# Patient Record
Sex: FEMALE | Race: WHITE
[De-identification: names, ages, dates, MRNs, and addresses within clinical notes are randomized per-mention and may not be internally consistent; named-entity substitution may affect disease eponyms.]

---

## 2018-07-27 ENCOUNTER — HOSPITAL ENCOUNTER (INPATIENT)
Dept: HOSPITAL 47 - EC | Age: 78
LOS: 3 days | Discharge: NURSING, REHAB OR LONG-TERM CARE | DRG: 690 | End: 2018-07-30
Attending: INTERNAL MEDICINE | Admitting: INTERNAL MEDICINE
Payer: MEDICARE

## 2018-07-27 DIAGNOSIS — I87.8: ICD-10-CM

## 2018-07-27 DIAGNOSIS — I08.3: ICD-10-CM

## 2018-07-27 DIAGNOSIS — T46.1X5A: ICD-10-CM

## 2018-07-27 DIAGNOSIS — E86.0: ICD-10-CM

## 2018-07-27 DIAGNOSIS — Z88.0: ICD-10-CM

## 2018-07-27 DIAGNOSIS — Z82.49: ICD-10-CM

## 2018-07-27 DIAGNOSIS — R32: ICD-10-CM

## 2018-07-27 DIAGNOSIS — E83.42: ICD-10-CM

## 2018-07-27 DIAGNOSIS — Z79.899: ICD-10-CM

## 2018-07-27 DIAGNOSIS — R00.1: ICD-10-CM

## 2018-07-27 DIAGNOSIS — R74.8: ICD-10-CM

## 2018-07-27 DIAGNOSIS — I10: ICD-10-CM

## 2018-07-27 DIAGNOSIS — G47.33: ICD-10-CM

## 2018-07-27 DIAGNOSIS — N39.0: Primary | ICD-10-CM

## 2018-07-27 DIAGNOSIS — J44.9: ICD-10-CM

## 2018-07-27 DIAGNOSIS — Z88.1: ICD-10-CM

## 2018-07-27 DIAGNOSIS — Z99.81: ICD-10-CM

## 2018-07-27 DIAGNOSIS — K21.9: ICD-10-CM

## 2018-07-27 DIAGNOSIS — Z87.891: ICD-10-CM

## 2018-07-27 DIAGNOSIS — L03.116: ICD-10-CM

## 2018-07-27 DIAGNOSIS — L03.115: ICD-10-CM

## 2018-07-27 DIAGNOSIS — Z79.82: ICD-10-CM

## 2018-07-27 DIAGNOSIS — E11.9: ICD-10-CM

## 2018-07-27 DIAGNOSIS — J44.0: ICD-10-CM

## 2018-07-27 DIAGNOSIS — F32.9: ICD-10-CM

## 2018-07-27 DIAGNOSIS — B96.1: ICD-10-CM

## 2018-07-27 DIAGNOSIS — F40.240: ICD-10-CM

## 2018-07-27 DIAGNOSIS — Z98.41: ICD-10-CM

## 2018-07-27 DIAGNOSIS — F41.1: ICD-10-CM

## 2018-07-27 DIAGNOSIS — Z87.01: ICD-10-CM

## 2018-07-27 DIAGNOSIS — T44.7X5A: ICD-10-CM

## 2018-07-27 DIAGNOSIS — E66.01: ICD-10-CM

## 2018-07-27 DIAGNOSIS — E78.2: ICD-10-CM

## 2018-07-27 DIAGNOSIS — Z79.4: ICD-10-CM

## 2018-07-27 DIAGNOSIS — Z96.1: ICD-10-CM

## 2018-07-27 DIAGNOSIS — Z96.653: ICD-10-CM

## 2018-07-27 DIAGNOSIS — M19.90: ICD-10-CM

## 2018-07-27 DIAGNOSIS — Z98.42: ICD-10-CM

## 2018-07-27 DIAGNOSIS — I48.91: ICD-10-CM

## 2018-07-27 DIAGNOSIS — R53.1: ICD-10-CM

## 2018-07-27 DIAGNOSIS — I27.20: ICD-10-CM

## 2018-07-27 DIAGNOSIS — Z82.0: ICD-10-CM

## 2018-07-27 DIAGNOSIS — I25.10: ICD-10-CM

## 2018-07-27 LAB
ALBUMIN SERPL-MCNC: 3.4 G/DL (ref 3.5–5)
ALP SERPL-CCNC: 92 U/L (ref 38–126)
ALT SERPL-CCNC: 34 U/L (ref 9–52)
ANION GAP SERPL CALC-SCNC: 5 MMOL/L
APAP SPEC-MCNC: <10 UG/ML
APTT BLD: 22.9 SEC (ref 22–30)
AST SERPL-CCNC: 38 U/L (ref 14–36)
BASOPHILS # BLD AUTO: 0.1 K/UL (ref 0–0.2)
BASOPHILS NFR BLD AUTO: 2 %
BUN SERPL-SCNC: 32 MG/DL (ref 7–17)
CALCIUM SPEC-MCNC: 9.1 MG/DL (ref 8.4–10.2)
CHLORIDE SERPL-SCNC: 106 MMOL/L (ref 98–107)
CK SERPL-CCNC: 136 U/L (ref 30–135)
CO2 SERPL-SCNC: 28 MMOL/L (ref 22–30)
EOSINOPHIL # BLD AUTO: 0.2 K/UL (ref 0–0.7)
EOSINOPHIL NFR BLD AUTO: 4 %
ERYTHROCYTE [DISTWIDTH] IN BLOOD BY AUTOMATED COUNT: 4.29 M/UL (ref 3.8–5.4)
ERYTHROCYTE [DISTWIDTH] IN BLOOD: 15.4 % (ref 11.5–15.5)
GLUCOSE BLD-MCNC: 151 MG/DL (ref 75–99)
GLUCOSE BLD-MCNC: 175 MG/DL (ref 75–99)
GLUCOSE SERPL-MCNC: 186 MG/DL (ref 74–99)
HCT VFR BLD AUTO: 37.3 % (ref 34–46)
HGB BLD-MCNC: 11.3 GM/DL (ref 11.4–16)
INR PPP: 1.2 (ref ?–1.2)
LYMPHOCYTES # SPEC AUTO: 0.8 K/UL (ref 1–4.8)
LYMPHOCYTES NFR SPEC AUTO: 14 %
MAGNESIUM SPEC-SCNC: 1.5 MG/DL (ref 1.6–2.3)
MCH RBC QN AUTO: 26.3 PG (ref 25–35)
MCHC RBC AUTO-ENTMCNC: 30.3 G/DL (ref 31–37)
MCV RBC AUTO: 86.9 FL (ref 80–100)
MONOCYTES # BLD AUTO: 0.4 K/UL (ref 0–1)
MONOCYTES NFR BLD AUTO: 7 %
NEUTROPHILS # BLD AUTO: 3.9 K/UL (ref 1.3–7.7)
NEUTROPHILS NFR BLD AUTO: 72 %
PH UR: 5 [PH] (ref 5–8)
PLATELET # BLD AUTO: 202 K/UL (ref 150–450)
POTASSIUM SERPL-SCNC: 4.4 MMOL/L (ref 3.5–5.1)
PROT SERPL-MCNC: 6.2 G/DL (ref 6.3–8.2)
PT BLD: 11.6 SEC (ref 9–12)
RBC UR QL: 1 /HPF (ref 0–5)
SODIUM SERPL-SCNC: 139 MMOL/L (ref 137–145)
SP GR UR: 1.01 (ref 1–1.03)
SQUAMOUS UR QL AUTO: <1 /HPF (ref 0–4)
TROPONIN I SERPL-MCNC: 0.04 NG/ML (ref 0–0.03)
UROBILINOGEN UR QL STRIP: <2 MG/DL (ref ?–2)
WBC # BLD AUTO: 5.4 K/UL (ref 3.8–10.6)
WBC #/AREA URNS HPF: <1 /HPF (ref 0–5)

## 2018-07-27 PROCEDURE — 83520 IMMUNOASSAY QUANT NOS NONAB: CPT

## 2018-07-27 PROCEDURE — 85730 THROMBOPLASTIN TIME PARTIAL: CPT

## 2018-07-27 PROCEDURE — 84100 ASSAY OF PHOSPHORUS: CPT

## 2018-07-27 PROCEDURE — 85610 PROTHROMBIN TIME: CPT

## 2018-07-27 PROCEDURE — 84484 ASSAY OF TROPONIN QUANT: CPT

## 2018-07-27 PROCEDURE — 96361 HYDRATE IV INFUSION ADD-ON: CPT

## 2018-07-27 PROCEDURE — 84443 ASSAY THYROID STIM HORMONE: CPT

## 2018-07-27 PROCEDURE — 83735 ASSAY OF MAGNESIUM: CPT

## 2018-07-27 PROCEDURE — 96374 THER/PROPH/DIAG INJ IV PUSH: CPT

## 2018-07-27 PROCEDURE — 83036 HEMOGLOBIN GLYCOSYLATED A1C: CPT

## 2018-07-27 PROCEDURE — 93306 TTE W/DOPPLER COMPLETE: CPT

## 2018-07-27 PROCEDURE — 96375 TX/PRO/DX INJ NEW DRUG ADDON: CPT

## 2018-07-27 PROCEDURE — 80053 COMPREHEN METABOLIC PANEL: CPT

## 2018-07-27 PROCEDURE — 94640 AIRWAY INHALATION TREATMENT: CPT

## 2018-07-27 PROCEDURE — 94760 N-INVAS EAR/PLS OXIMETRY 1: CPT

## 2018-07-27 PROCEDURE — 93005 ELECTROCARDIOGRAM TRACING: CPT

## 2018-07-27 PROCEDURE — 36415 COLL VENOUS BLD VENIPUNCTURE: CPT

## 2018-07-27 PROCEDURE — 87086 URINE CULTURE/COLONY COUNT: CPT

## 2018-07-27 PROCEDURE — 82550 ASSAY OF CK (CPK): CPT

## 2018-07-27 PROCEDURE — 87077 CULTURE AEROBIC IDENTIFY: CPT

## 2018-07-27 PROCEDURE — 80048 BASIC METABOLIC PNL TOTAL CA: CPT

## 2018-07-27 PROCEDURE — 81001 URINALYSIS AUTO W/SCOPE: CPT

## 2018-07-27 PROCEDURE — 82553 CREATINE MB FRACTION: CPT

## 2018-07-27 PROCEDURE — 99285 EMERGENCY DEPT VISIT HI MDM: CPT

## 2018-07-27 PROCEDURE — 87186 SC STD MICRODIL/AGAR DIL: CPT

## 2018-07-27 PROCEDURE — 85025 COMPLETE CBC W/AUTO DIFF WBC: CPT

## 2018-07-27 RX ADMIN — ASPIRIN SCH: 325 TABLET ORAL at 21:43

## 2018-07-27 RX ADMIN — IPRATROPIUM BROMIDE AND ALBUTEROL SULFATE SCH: .5; 3 SOLUTION RESPIRATORY (INHALATION) at 20:01

## 2018-07-27 RX ADMIN — INSULIN ASPART SCH UNIT: 100 INJECTION, SOLUTION INTRAVENOUS; SUBCUTANEOUS at 18:43

## 2018-07-27 RX ADMIN — INSULIN ASPART SCH UNIT: 100 INJECTION, SOLUTION INTRAVENOUS; SUBCUTANEOUS at 21:40

## 2018-07-27 RX ADMIN — IPRATROPIUM BROMIDE AND ALBUTEROL SULFATE SCH ML: .5; 3 SOLUTION RESPIRATORY (INHALATION) at 20:05

## 2018-07-27 NOTE — ECHOF
Referral Reason:Elevated troponin, lower extremity edema



MEASUREMENTS

--------

HEIGHT: 162.6 cm

WEIGHT: 113.4 kg

BP: 133/74

RVIDd:   3.7 cm     (< 3.3)

IVSd:   1.3 cm     (0.6 - 1.1)

LVIDd:   4.9 cm     (3.9 - 5.3)

LVPWd:   1.3 cm     (0.6 - 1.1)

IVSs:   1.9 cm

LVIDs:   3.5 cm

LVPWs:   1.9 cm

LA Diam:   4.1 cm     (2.7 - 3.8)

LAESV Index (A-L):   55.23 ml/m

Ao Diam:   3.1 cm     (2.0 - 3.7)

AV Cusp:   1.3 cm     (1.5 - 2.6)

MV EXCURSION:   18.807 mm     (> 18.000)

MV EF SLOPE:   44 mm/s     (70 - 150)

EPSS:   0.2 cm

MV E Dada:   1.67 m/s

MV DecT:   225 ms

MV A Dada:   0.89 m/s

MV E/A Ratio:   1.87 

AV maxP.27 mmHg

AV meanP.64 mmHg

RAP:   15.00 mmHg

RVSP:   78.55 mmHg







FINDINGS

--------

Sinus rhythm.

This was a technically adequate study.

The left ventricular size is normal.   There is mild concentric left ventricular hypertrophy.   Overa
ll left ventricular systolic function is normal with, an EF between 55 - 60 %.

The right ventricle is mildly enlarged.

LA is severely dilated >40 ml/m2

The right atrium is normal in size.

There is moderate to severe aortic valve sclerosis.   Trace to mild aortic regurgitation.   There is 
mild aortic stenosis present.   Peak/mean gradient across the Aortic Valve is 25.27mmHg / 12.64mmHg.

The mitral valve leaflets are moderately thickened.   Severe mitral annular calcification present.   
Moderate mitral regurgitation is present.    The peak and mean MV gradients are 13.43mmHg 3.55mmHg as
 measured by doppler.

Severe tricuspid regurgitation present.   There is severe pulmonary hypertension.   The right ventric
ular systolic pressure, as measured by Doppler, is 78.55mmHg.

Moderate pulmonic regurgitation.

The aortic root size is normal.

The inferior vena cava is dilated with no significant inspiratory collapse which is consistent estima
bharati right atrial pressure of >15 mmHg.

There is no pericardial effusion.



CONCLUSIONS

--------

1. Sinus rhythm.

2. This was a technically adequate study.

3. The left ventricular size is normal.

4. There is mild concentric left ventricular hypertrophy.

5. Overall left ventricular systolic function is normal with, an EF between 55 - 60 %.

6. The right ventricle is mildly enlarged.

7. LA is severely dilated >40 ml/m2

8. The right atrium is normal in size.

9. There is moderate to severe aortic valve sclerosis.

10. Trace to mild aortic regurgitation.

11. There is mild aortic stenosis present.

12. Peak/mean gradient across the Aortic Valve is 25.27mmHg / 12.64mmHg.

13. The mitral valve leaflets are moderately thickened.

14. Severe mitral annular calcification present.

15. Moderate mitral regurgitation is present.

16. The peak and mean MV gradients are 13.43mmHg 3.55mmHg as measured by doppler.

17. Severe tricuspid regurgitation present.

18. There is severe pulmonary hypertension.

19. The right ventricular systolic pressure, as measured by Doppler, is 78.55mmHg.

20. Moderate pulmonic regurgitation.

21. The aortic root size is normal.

22. The inferior vena cava is dilated with no significant inspiratory collapse which is consistent es
timated right atrial pressure of >15 mmHg.

23. There is no pericardial effusion.





SONOGRAPHER: Briana Finch RDCS

## 2018-07-27 NOTE — CONS
CONSULTATION



DATE OF SERVICE:

07/27/2018.



REASON FOR CONSULTATION:

1. UTI.

2. Left leg wound cellulitis.

3. Antibiotic allergy.



HISTORY OF PRESENT ILLNESS:

The patient is a 77-year-old  female who was brought in to the ER at [facility] with chief complaints of not feeling well, feeling run down and thought

she may have a urinary tract infection, as the patient has some burning of urine, but

no significant frequency, suprapubic discomfort, any the flank pain, nausea or

vomiting.  The patient also has been scratching her left leg and has led to some

superficial ulceration. Did have minimal drainage and pain at that left leg site, more

of a dull aching pain, 2/10-3/10 and no radiation.  With these symptoms, the patient

has been evaluated by the ER physician.  On arrival to the ER, the patient has been

afebrile.  She noticed to have a normal white count.  Urine was mildly positive for

nitrate positive, rare WBC.  No chest x-ray has been done.  The patient has been

admitted to hospital to monitor her condition.  The patient started on cefazolin 2 g

q.12 and Infectious Disease was consulted for further recommendation of antibiotic

therapy.  The patient did receive a dose of Rocephin in the ER as well, though.



REVIEW OF SYSTEMS:

CONSTITUTIONALLY:  Positive for weakness.  Denies any high-grade fever.

EYES:  No complaint.

ENT:  No complaint.

RESPIRATORY:  No complaint.

CARDIOVASCULAR:  No complaint.

GENITOURINARY:  As per HPI.

GASTROINTESTINAL:  No complaint.

MUSCULOSKELETAL:  No complaint.

INTEGUMENTARY:  As per HPI.

PSYCHOLOGICAL:  No complaint.

ENDOCRINE:  No complaint.

NEUROLOGIC:  No complaint.



PAST MEDICAL HISTORY:

Hypertension, hyperlipidemia, diabetes mellitus, coronary artery disease, anxiety,

depression.



PAST SURGICAL HISTORY:

Bilateral eye surgery.



SOCIAL HISTORY:

Remote history of smoking.  No drinking or drug use.



FAMILY HISTORY:

No pertinent findings noticed.



ALLERGIES:

To PENICILLIN; however, Rocephin is not any problem.  Also allergic to VANCOMYCIN and

IODINATED CONTRAST DYE.



MEDICATIONS:

Include the patient is currently on:

1. Zoloft.

2. Protonix.

3. Zofran.

4. Nystatin cream.

5. Toprol-XL.

6. Levemir.

7. NovoLog.

8. Lasix.

9. Lovenox.

10.Cefazolin 2 g q.12.

11.Tums.

12.Lipitor.

13.Aspirin.

14.Xanax.

15.DuoNeb.

16.Norco.

17.Tylenol.



EXAMINATION:

Her blood pressure was 60/72 with a pulse of 80, temperature is 97.4.  She is 98% on 3L

nasal cannula.  General description is an elderly female lying in bed in no distress.

No tachypnea or accessory muscle of respiration use.  HEENT shows slight pallor.  No

scleral icterus.  Oral mucous membranes dry.  No pharyngeal erythema or thrush.

NECK:  Trachea central.  No thyromegaly.

LUNGS:  Unlabored breathing.  Clear to auscultation anteriorly.  No wheeze or crackle.

HEART:  S1, S2.  Regular rate and rhythm.

ABDOMEN:  Soft, no tenderness.  No guarding or rigidity.

EXTREMITIES:  Left leg with minimal erythema with a pressure heel ulceration and

drainage on the dressing.  No foul smelling.

SKIN:  No rash or mass palpable.  Neurologically, the patient is awake, alert, oriented

x3.  Mood and affect normal.



LABS:

Hemoglobin 11.8, white count of 5.4.  BUN of 32, creatinine 0.80. Electrolytes have

been normal.  Liver enzymes are normal.  Troponin slightly elevated.  Urine shows

nitrite positive only.



DIAGNOSTIC IMPRESSION AND PLAN:

1. Patient admitted to hospital with generalized weakness, which is likely

    multifactorial in this patient who did have urinary symptoms of burning; however,

    the urine is not significantly positive.  The patient also has superficial

    ulceration with minimal cellulitis not entirely excluded, likely from a gram-

    positive skin georgi.

2. The patient does have multiple antibiotic allergies that does limit the number of

    antibiotics that could be safely used.  She did have history of PENICILLIN;

    however, tolerated Rocephin without any problem.



PLAN:

1. Will keep the patient on cefazolin; however, change the dose to every 8 hours in

    view of her normal kidney function.

2. Cristian the area of redness on the left leg.

3. Depending upon clinical response as well as culture, will adjust her medications

    further if needed.

Thank you for this consultation.  Will follow this patient along with you.





MMODL / IJN: [id number] / Job#: [id number]

## 2018-07-27 NOTE — ED
General Adult HPI





- General


Chief complaint: Weakness


Stated complaint: Weakness


Time Seen by Provider: 07/27/18 11:16


Source: patient, EMS, RN notes reviewed, old records reviewed


Mode of arrival: EMS


Limitations: physical limitation





- History of Present Illness


Initial comments: 





This is a 77-year-old female the ER for evaluation today.  States she is 

presenting for evaluation of weakness, decreased activity level, decreased 

appetite.  A she has complex medical history, coming in from extended care 

facility with mild assisted care.  Patient's level of activity and daily 

activities is significantly diminished the last 2 days.  Usually when she gets 

like this family is concerned about UTI as well as dehydration as well as 

patient's appetite.  Patient himself denies any significant acute complaint or 

recent fevers





- Related Data


 Home Medications











 Medication  Instructions  Recorded  Confirmed


 


ALPRAZolam [Xanax] 0.25 mg PO TID PRN 07/27/18 07/27/18


 


Acetaminophen Tab [Tylenol Tab] 650 mg PO Q6H PRN 07/27/18 07/27/18


 


Albuterol Inhaler [Ventolin Hfa 2 puff INHALATION RT-Q4H PRN 07/27/18 07/27/18





Inhaler]   


 


Ascorbic Acid [Vitamin C] 500 mg PO DAILY 07/27/18 07/27/18


 


Aspirin EC [Ecotrin Low Dose] 81 mg PO DAILY 07/27/18 07/27/18


 


Atorvastatin [Lipitor] 10 mg PO DAILY 07/27/18 07/27/18


 


Bisacodyl [Dulcolax] 10 mg RECTAL DAILY PRN 07/27/18 07/27/18


 


Calcium Carbonate [Tums Ultra 1,177 mg PO TID PRN 07/27/18 07/27/18





Strength]   


 


Cholecalciferol (Vitamin D3) 2,000 unit PO DAILY 07/27/18 07/27/18





[Vitamin D3]   


 


Diltiazem HCl [Diltiazem 12Hr ER] 60 mg PO Q12HR 07/27/18 07/27/18


 


Ferrous Gluconate 324 mg PO MOWEFR 07/27/18 07/27/18


 


Fluconazole [Diflucan] 150 mg PO TH 07/27/18 07/27/18


 


Furosemide [Lasix] 40 mg PO DAILY 07/27/18 07/27/18


 


HYDROcodone/APAP 5-325MG [Norco 1 tab PO Q6HR PRN 07/27/18 07/27/18





5-325]   


 


Insulin Glargine,Hum.rec.anlog 16 unit SQ DAILY 07/27/18 07/27/18





[Lantus Solostar]   


 


Insulin Lispro [humaLOG Kwikpen] See Protocol SQ ACHS 07/27/18 07/27/18


 


Ipratropium-Albuterol Nebulize 3 ml INHALATION RT-QID 07/27/18 07/27/18





[Duoneb 0.5 mg-3 mg/3 ml Soln]   


 


Magnesium Hydroxide [Milk of 2,400 mg PO DAILY PRN 07/27/18 07/27/18





Magnesia]   


 


Metoprolol Succinate (ER) [Toprol 50 mg PO DAILY 07/27/18 07/27/18





Xl]   


 


Nystatin 100,000Unit/gm Cream 1 applic TOPICAL BID PRN 07/27/18 07/27/18





[Mycostatin Cream]   


 


Pantoprazole Sodium [Protonix] 40 mg PO DAILY 07/27/18 07/27/18


 


Q-Tussin-Dm 10 ml PO Q4H PRN 07/27/18 07/27/18


 


Senexon-S 1 tab PO DAILY 07/27/18 07/27/18


 


Sertraline [Zoloft] 100 mg PO DAILY 07/27/18 07/27/18


 


Temazepam [Restoril] 15 mg PO HS PRN 07/27/18 07/27/18











 Allergies











Allergy/AdvReac Type Severity Reaction Status Date / Time


 


Iodinated Contrast- Oral and Allergy  Unknown Verified 07/27/18 11:55





IV Dye     


 


Penicillins Allergy  Unknown Verified 07/27/18 11:55


 


vancomycin Allergy  Unknown Verified 07/27/18 11:55














Review of Systems


ROS Statement: 


Those systems with pertinent positive or pertinent negative responses have been 

documented in the HPI.





ROS Other: All systems not noted in ROS Statement are negative.





Past Medical History


Past Medical History: Coronary Artery Disease (CAD), Diabetes Mellitus, 

Hyperlipidemia, Hypertension


History of Any Multi-Drug Resistant Organisms: None Reported


Past Surgical History: Orthopedic Surgery


Additional Past Surgical History / Comment(s): dontae eye surgery


Past Psychological History: Anxiety, Depression


Smoking Status: Former smoker


Past Alcohol Use History: None Reported


Past Drug Use History: None Reported





General Exam


Limitations: physical limitation


General appearance: alert, in no apparent distress, obese


Head exam: Present: atraumatic, normocephalic, normal inspection


Eye exam: Present: normal appearance, PERRL, EOMI.  Absent: scleral icterus, 

conjunctival injection, periorbital swelling


ENT exam: Present: normal exam, mucous membranes moist


Neck exam: Present: normal inspection.  Absent: tenderness, meningismus, 

lymphadenopathy


Respiratory exam: Present: normal lung sounds bilaterally.  Absent: respiratory 

distress, wheezes, rales, rhonchi, stridor


Cardiovascular Exam: Present: regular rate, normal rhythm, normal heart sounds.

  Absent: systolic murmur, diastolic murmur, rubs, gallop, clicks


GI/Abdominal exam: Present: soft, normal bowel sounds.  Absent: distended, 

tenderness, guarding, rebound, rigid


Extremities exam: Present: normal inspection, full ROM, normal capillary 

refill.  Absent: tenderness, pedal edema, joint swelling, calf tenderness


Back exam: Present: normal inspection


Neurological exam: Present: alert, oriented X3, CN II-XII intact


Psychiatric exam: Present: normal affect, normal mood


Skin exam: Present: warm, dry, intact, normal color.  Absent: rash





Course


 Vital Signs











  07/27/18 07/27/18 07/27/18





  11:11 11:21 12:14


 


Temperature 97.9 F  


 


Pulse Rate 60  59 L


 


Respiratory 20  22





Rate   


 


Blood Pressure 121/67  122/62


 


O2 Sat by Pulse 88 L 95 97





Oximetry   














  07/27/18





  13:00


 


Temperature 97.4 F L


 


Pulse Rate 54 L


 


Respiratory 18





Rate 


 


Blood Pressure 133/74


 


O2 Sat by Pulse 95





Oximetry 














EKG Findings





- EKG Comments:


EKG Findings:: EKG shows sinus bradycardia rate 55, , , QTc 451





Medical Decision Making





- Medical Decision Making





37 female the ER for evaluation, patient has positive UTI, positive weakness.  

Otherwise lab values are within normal limits.  Patient will be admitted for IV 

hydration and IV antibiotics increase of activity and PT





- Lab Data


Result diagrams: 


 07/27/18 11:15





 07/27/18 11:15


 Lab Results











  07/27/18 07/27/18 07/27/18 Range/Units





  11:15 11:15 11:15 


 


WBC    5.4  (3.8-10.6)  k/uL


 


RBC    4.29  (3.80-5.40)  m/uL


 


Hgb    11.3 L  (11.4-16.0)  gm/dL


 


Hct    37.3  (34.0-46.0)  %


 


MCV    86.9  (80.0-100.0)  fL


 


MCH    26.3  (25.0-35.0)  pg


 


MCHC    30.3 L  (31.0-37.0)  g/dL


 


RDW    15.4  (11.5-15.5)  %


 


Plt Count    202  (150-450)  k/uL


 


Neutrophils %    72  %


 


Lymphocytes %    14  %


 


Monocytes %    7  %


 


Eosinophils %    4  %


 


Basophils %    2  %


 


Neutrophils #    3.9  (1.3-7.7)  k/uL


 


Lymphocytes #    0.8 L  (1.0-4.8)  k/uL


 


Monocytes #    0.4  (0-1.0)  k/uL


 


Eosinophils #    0.2  (0-0.7)  k/uL


 


Basophils #    0.1  (0-0.2)  k/uL


 


Hypochromasia    Marked  


 


PT     (9.0-12.0)  sec


 


INR     (<1.2)  


 


APTT     (22.0-30.0)  sec


 


Sodium  139    (137-145)  mmol/L


 


Potassium  4.4    (3.5-5.1)  mmol/L


 


Chloride  106    ()  mmol/L


 


Carbon Dioxide  28    (22-30)  mmol/L


 


Anion Gap  5    mmol/L


 


BUN  32 H    (7-17)  mg/dL


 


Creatinine  0.80    (0.52-1.04)  mg/dL


 


Est GFR (CKD-EPI)AfAm  82    (>60 ml/min/1.73 sqM)  


 


Est GFR (CKD-EPI)NonAf  72    (>60 ml/min/1.73 sqM)  


 


Glucose  186 H    (74-99)  mg/dL


 


Calcium  9.1    (8.4-10.2)  mg/dL


 


Phosphorus  4.0    (2.5-4.5)  mg/dL


 


Magnesium  1.5 L    (1.6-2.3)  mg/dL


 


Total Bilirubin  1.1    (0.2-1.3)  mg/dL


 


AST  38 H    (14-36)  U/L


 


ALT  34    (9-52)  U/L


 


Alkaline Phosphatase  92    ()  U/L


 


Total Creatine Kinase   136 H   ()  U/L


 


CK-MB (CK-2)   3.1 H*   (0.0-2.4)  ng/mL


 


CK-MB (CK-2) Rel Index   2.3   


 


Troponin I   0.043 H*   (0.000-0.034)  ng/mL


 


Total Protein  6.2 L    (6.3-8.2)  g/dL


 


Albumin  3.4 L    (3.5-5.0)  g/dL


 


Urine Color     


 


Urine Appearance     (Clear)  


 


Urine pH     (5.0-8.0)  


 


Ur Specific Gravity     (1.001-1.035)  


 


Urine Protein     (Negative)  


 


Urine Glucose (UA)     (Negative)  


 


Urine Ketones     (Negative)  


 


Urine Blood     (Negative)  


 


Urine Nitrite     (Negative)  


 


Urine Bilirubin     (Negative)  


 


Urine Urobilinogen     (<2.0)  mg/dL


 


Ur Leukocyte Esterase     (Negative)  


 


Urine RBC     (0-5)  /hpf


 


Urine WBC     (0-5)  /hpf


 


Ur Squamous Epith Cells     (0-4)  /hpf


 


Urine Mucus     (None)  /hpf


 


Acetaminophen  <10.0    ug/mL














  07/27/18 07/27/18 Range/Units





  11:15 11:49 


 


WBC    (3.8-10.6)  k/uL


 


RBC    (3.80-5.40)  m/uL


 


Hgb    (11.4-16.0)  gm/dL


 


Hct    (34.0-46.0)  %


 


MCV    (80.0-100.0)  fL


 


MCH    (25.0-35.0)  pg


 


MCHC    (31.0-37.0)  g/dL


 


RDW    (11.5-15.5)  %


 


Plt Count    (150-450)  k/uL


 


Neutrophils %    %


 


Lymphocytes %    %


 


Monocytes %    %


 


Eosinophils %    %


 


Basophils %    %


 


Neutrophils #    (1.3-7.7)  k/uL


 


Lymphocytes #    (1.0-4.8)  k/uL


 


Monocytes #    (0-1.0)  k/uL


 


Eosinophils #    (0-0.7)  k/uL


 


Basophils #    (0-0.2)  k/uL


 


Hypochromasia    


 


PT  11.6   (9.0-12.0)  sec


 


INR  1.2 H   (<1.2)  


 


APTT  22.9   (22.0-30.0)  sec


 


Sodium    (137-145)  mmol/L


 


Potassium    (3.5-5.1)  mmol/L


 


Chloride    ()  mmol/L


 


Carbon Dioxide    (22-30)  mmol/L


 


Anion Gap    mmol/L


 


BUN    (7-17)  mg/dL


 


Creatinine    (0.52-1.04)  mg/dL


 


Est GFR (CKD-EPI)AfAm    (>60 ml/min/1.73 sqM)  


 


Est GFR (CKD-EPI)NonAf    (>60 ml/min/1.73 sqM)  


 


Glucose    (74-99)  mg/dL


 


Calcium    (8.4-10.2)  mg/dL


 


Phosphorus    (2.5-4.5)  mg/dL


 


Magnesium    (1.6-2.3)  mg/dL


 


Total Bilirubin    (0.2-1.3)  mg/dL


 


AST    (14-36)  U/L


 


ALT    (9-52)  U/L


 


Alkaline Phosphatase    ()  U/L


 


Total Creatine Kinase    ()  U/L


 


CK-MB (CK-2)    (0.0-2.4)  ng/mL


 


CK-MB (CK-2) Rel Index    


 


Troponin I    (0.000-0.034)  ng/mL


 


Total Protein    (6.3-8.2)  g/dL


 


Albumin    (3.5-5.0)  g/dL


 


Urine Color   Yellow  


 


Urine Appearance   Clear  (Clear)  


 


Urine pH   5.0  (5.0-8.0)  


 


Ur Specific Gravity   1.008  (1.001-1.035)  


 


Urine Protein   Negative  (Negative)  


 


Urine Glucose (UA)   Negative  (Negative)  


 


Urine Ketones   Negative  (Negative)  


 


Urine Blood   Negative  (Negative)  


 


Urine Nitrite   Positive H  (Negative)  


 


Urine Bilirubin   Negative  (Negative)  


 


Urine Urobilinogen   <2.0  (<2.0)  mg/dL


 


Ur Leukocyte Esterase   Negative  (Negative)  


 


Urine RBC   1  (0-5)  /hpf


 


Urine WBC   <1  (0-5)  /hpf


 


Ur Squamous Epith Cells   <1  (0-4)  /hpf


 


Urine Mucus   Rare H  (None)  /hpf


 


Acetaminophen    ug/mL














Disposition


Clinical Impression: 


 Dehydration, Weakness, UTI (urinary tract infection)





Disposition: ADMITTED AS IP TO THIS HOSP


Condition: Good


Is patient prescribed a controlled substance at d/c from ED?: No

## 2018-07-27 NOTE — P.HPIM
History of Present Illness


H&P Date: 07/27/18


Chief Complaint: Generalized weakness





This is a 77-year-old female with complex past medical history noted below 

significant for morbid obesity who presented to the emergency room from a local 

assisted living facility with progressive weakness.  Over the last few days, 

patient has been feeling progressively weak.  She usually needs assistance with 

transfer from her bed to the chair.  Yesterday one person assist was not enough 

and this morning patient requires 2 people assist as well.  She was sent to the 

emergency room for further evaluation.  Patient does not have any specific 

concerns otherwise.  There is no neurological focal deficits.  No headache or 

chest pain.  No shortness of breath.  She is just feeling generally weak.  No 

fevers or chills.  No abdominal pain.  No urinary symptoms.  Patient was 

evaluated in the emergency room and her lab work was generally within 

acceptable range.  She was noted to be slightly bradycardic with a heart rate 

in the 50s and sinus bradycardia.  She was also noted to have a mildly elevated 

troponin.  Patient herself denies any chest pain or shortness of breath.  12-

lead ECG showed no acute ischemic changes.  Urinalysis show some evidence of 

infection but patient is incontinent.





Review of Systems





Review of system:


14 points review of systems were obtained and were negative except to what were 

mentioned in the HPI.





Past Medical History


Past Medical History: Coronary Artery Disease (CAD), Diabetes Mellitus, 

Hyperlipidemia, Hypertension


History of Any Multi-Drug Resistant Organisms: None Reported


Past Surgical History: Orthopedic Surgery


Additional Past Surgical History / Comment(s): dontae eye surgery


Past Psychological History: Anxiety, Depression


Smoking Status: Former smoker


Past Alcohol Use History: None Reported


Past Drug Use History: None Reported





Medications and Allergies


 Home Medications











 Medication  Instructions  Recorded  Confirmed  Type


 


ALPRAZolam [Xanax] 0.25 mg PO TID PRN 07/27/18 07/27/18 History


 


Acetaminophen Tab [Tylenol Tab] 650 mg PO Q6H PRN 07/27/18 07/27/18 History


 


Albuterol Inhaler [Ventolin Hfa 2 puff INHALATION RT-Q4H PRN 07/27/18 07/27/18 

History





Inhaler]    


 


Ascorbic Acid [Vitamin C] 500 mg PO DAILY 07/27/18 07/27/18 History


 


Aspirin EC [Ecotrin Low Dose] 81 mg PO DAILY 07/27/18 07/27/18 History


 


Atorvastatin [Lipitor] 10 mg PO DAILY 07/27/18 07/27/18 History


 


Bisacodyl [Dulcolax] 10 mg RECTAL DAILY PRN 07/27/18 07/27/18 History


 


Calcium Carbonate [Tums Ultra 1,177 mg PO TID PRN 07/27/18 07/27/18 History





Strength]    


 


Cholecalciferol (Vitamin D3) 2,000 unit PO DAILY 07/27/18 07/27/18 History





[Vitamin D3]    


 


Diltiazem HCl [Diltiazem 12Hr ER] 60 mg PO Q12HR 07/27/18 07/27/18 History


 


Ferrous Gluconate 324 mg PO MOWEFR 07/27/18 07/27/18 History


 


Fluconazole [Diflucan] 150 mg PO TH 07/27/18 07/27/18 History


 


Furosemide [Lasix] 40 mg PO DAILY 07/27/18 07/27/18 History


 


HYDROcodone/APAP 5-325MG [Norco 1 tab PO Q6HR PRN 07/27/18 07/27/18 History





5-325]    


 


Insulin Glargine,Hum.rec.anlog 16 unit SQ DAILY 07/27/18 07/27/18 History





[Lantus Solostar]    


 


Insulin Lispro [humaLOG Kwikpen] See Protocol SQ ACHS 07/27/18 07/27/18 History


 


Ipratropium-Albuterol Nebulize 3 ml INHALATION RT-QID 07/27/18 07/27/18 History





[Duoneb 0.5 mg-3 mg/3 ml Soln]    


 


Magnesium Hydroxide [Milk of 2,400 mg PO DAILY PRN 07/27/18 07/27/18 History





Magnesia]    


 


Metoprolol Succinate (ER) [Toprol 50 mg PO DAILY 07/27/18 07/27/18 History





Xl]    


 


Nystatin 100,000Unit/gm Cream 1 applic TOPICAL BID PRN 07/27/18 07/27/18 History





[Mycostatin Cream]    


 


Pantoprazole Sodium [Protonix] 40 mg PO DAILY 07/27/18 07/27/18 History


 


Q-Tussin-Dm 10 ml PO Q4H PRN 07/27/18 07/27/18 History


 


Senexon-S 1 tab PO DAILY 07/27/18 07/27/18 History


 


Sertraline [Zoloft] 100 mg PO DAILY 07/27/18 07/27/18 History


 


Temazepam [Restoril] 15 mg PO HS PRN 07/27/18 07/27/18 History











 Allergies











Allergy/AdvReac Type Severity Reaction Status Date / Time


 


Iodinated Contrast- Oral and Allergy  Unknown Verified 07/27/18 11:55





IV Dye     


 


Penicillins Allergy  Unknown Verified 07/27/18 11:55


 


vancomycin Allergy  Unknown Verified 07/27/18 11:55














Physical Exam


Vitals: 


 Vital Signs











  Temp Pulse Resp BP Pulse Ox


 


 07/27/18 13:00  97.4 F L  54 L  18  133/74  95


 


 07/27/18 12:14   59 L  22  122/62  97


 


 07/27/18 11:21      95


 


 07/27/18 11:11  97.9 F  60  20  121/67  88 L








 Intake and Output











 07/26/18 07/27/18 07/27/18





 22:59 06:59 14:59


 


Other:   


 


  Weight   113.398 kg














General:  The patient is awake and alert, in no distress


Eye: there is normal conjunctiva bilaterally.  


Neck:  The neck is supple, there is no  JVD.  


Cardiovascular:  Heart sounds are distant.  Normal  S1-S2, no S3-S4, no murmurs.


Respiratory: Lungs clear to anterior chest  auscultation bilaterally


Gastrointestinal: Abdomen is  very obese, soft, nontender


Musculoskeletal:  There is no pedal edema.  


Neurological:. Speech is normal. 


Skin:  Skin is warm and dry.  There is evidence of chronic venous stasis on 

both legs with area of worsening erythema and a small ulceration on the 

anterior aspect of the left mid chin.  There is no drainage or pus noted. 





Results


CBC & Chem 7: 


 07/27/18 11:15





 07/27/18 11:15


Labs: 


 Abnormal Lab Results - Last 24 Hours (Table)











  07/27/18 07/27/18 07/27/18 Range/Units





  11:15 11:15 11:15 


 


Hgb    11.3 L  (11.4-16.0)  gm/dL


 


MCHC    30.3 L  (31.0-37.0)  g/dL


 


Lymphocytes #    0.8 L  (1.0-4.8)  k/uL


 


INR     (<1.2)  


 


BUN  32 H    (7-17)  mg/dL


 


Glucose  186 H    (74-99)  mg/dL


 


Magnesium  1.5 L    (1.6-2.3)  mg/dL


 


AST  38 H    (14-36)  U/L


 


Total Creatine Kinase   136 H   ()  U/L


 


CK-MB (CK-2)   3.1 H*   (0.0-2.4)  ng/mL


 


Troponin I   0.043 H*   (0.000-0.034)  ng/mL


 


Total Protein  6.2 L    (6.3-8.2)  g/dL


 


Albumin  3.4 L    (3.5-5.0)  g/dL


 


Urine Nitrite     (Negative)  


 


Urine Mucus     (None)  /hpf














  07/27/18 07/27/18 Range/Units





  11:15 11:49 


 


Hgb    (11.4-16.0)  gm/dL


 


MCHC    (31.0-37.0)  g/dL


 


Lymphocytes #    (1.0-4.8)  k/uL


 


INR  1.2 H   (<1.2)  


 


BUN    (7-17)  mg/dL


 


Glucose    (74-99)  mg/dL


 


Magnesium    (1.6-2.3)  mg/dL


 


AST    (14-36)  U/L


 


Total Creatine Kinase    ()  U/L


 


CK-MB (CK-2)    (0.0-2.4)  ng/mL


 


Troponin I    (0.000-0.034)  ng/mL


 


Total Protein    (6.3-8.2)  g/dL


 


Albumin    (3.5-5.0)  g/dL


 


Urine Nitrite   Positive H  (Negative)  


 


Urine Mucus   Rare H  (None)  /hpf














Assessment and Plan


Assessment: 





1.  Sinus bradycardia, may be attributed to Cardizem and Toprol-XL use.  I 

would decrease her Toprol-XL dose from 50-25 mg daily.  I would continue 

telemetry monitoring.  We will check thyroid function tests.





2.  Elevated troponin: Most likely non-thrombotic troponin leak.  Patient 

denies any chest pain.  12-lead ECG showed no acute ischemic changes.  I would 

obtain repeat troponin.  I would order echocardiogram.  Consult cardiology for 

further evaluation.  Continue telemetry monitoring.  Patient sister informed me 

that patient had underlying coronary artery disease but no prior left heart 

catheterization.  She was seen by cardiology at [facility] previously





3.  Non-complicated urinary tract infection, given 1 dose of IV ceftriaxone in 

the emergency room.  Antibiotic switched to IV cefazolin.  Awaiting urine 

culture.





4.  Bilateral lower extremity cellulitis/chronic venous stasis: Continue IV 

cefazolin.  Infectious disease consulted for further evaluation and 

recommendations





5.  Type II diabetes mellitus, continue home dose of insulin.  Add sliding 

scale.  A1c ordered 





6.  Morbid obesity





7.  Mixed hyperlipidemia on Lipitor





8.  Generalized anxiety disorder, maintained on Xanax 3 times a day.  I would 

discontinue nighttime off Restoril





9.  Major depressive disorder: Continue home medication





10.  Physical debility, PT/OT/ consulted.  Patient may benefit 

from subacute rehab

## 2018-07-28 LAB
ANION GAP SERPL CALC-SCNC: 9 MMOL/L
BASOPHILS # BLD AUTO: 0.1 K/UL (ref 0–0.2)
BASOPHILS NFR BLD AUTO: 1 %
BUN SERPL-SCNC: 33 MG/DL (ref 7–17)
CALCIUM SPEC-MCNC: 8.9 MG/DL (ref 8.4–10.2)
CHLORIDE SERPL-SCNC: 109 MMOL/L (ref 98–107)
CO2 SERPL-SCNC: 24 MMOL/L (ref 22–30)
EOSINOPHIL # BLD AUTO: 0.3 K/UL (ref 0–0.7)
EOSINOPHIL NFR BLD AUTO: 6 %
ERYTHROCYTE [DISTWIDTH] IN BLOOD BY AUTOMATED COUNT: 4.4 M/UL (ref 3.8–5.4)
ERYTHROCYTE [DISTWIDTH] IN BLOOD: 15.3 % (ref 11.5–15.5)
GLUCOSE BLD-MCNC: 124 MG/DL (ref 75–99)
GLUCOSE BLD-MCNC: 124 MG/DL (ref 75–99)
GLUCOSE BLD-MCNC: 137 MG/DL (ref 75–99)
GLUCOSE BLD-MCNC: 153 MG/DL (ref 75–99)
GLUCOSE BLD-MCNC: 200 MG/DL (ref 75–99)
GLUCOSE SERPL-MCNC: 112 MG/DL (ref 74–99)
HBA1C MFR BLD: 8.1 % (ref 4–6)
HCT VFR BLD AUTO: 38.5 % (ref 34–46)
HGB BLD-MCNC: 11.2 GM/DL (ref 11.4–16)
LYMPHOCYTES # SPEC AUTO: 1 K/UL (ref 1–4.8)
LYMPHOCYTES NFR SPEC AUTO: 20 %
MAGNESIUM SPEC-SCNC: 1.5 MG/DL (ref 1.6–2.3)
MCH RBC QN AUTO: 25.5 PG (ref 25–35)
MCHC RBC AUTO-ENTMCNC: 29.1 G/DL (ref 31–37)
MCV RBC AUTO: 87.5 FL (ref 80–100)
MONOCYTES # BLD AUTO: 0.4 K/UL (ref 0–1)
MONOCYTES NFR BLD AUTO: 8 %
NEUTROPHILS # BLD AUTO: 3 K/UL (ref 1.3–7.7)
NEUTROPHILS NFR BLD AUTO: 63 %
PLATELET # BLD AUTO: 188 K/UL (ref 150–450)
POTASSIUM SERPL-SCNC: 4.4 MMOL/L (ref 3.5–5.1)
SODIUM SERPL-SCNC: 142 MMOL/L (ref 137–145)
WBC # BLD AUTO: 4.8 K/UL (ref 3.8–10.6)

## 2018-07-28 RX ADMIN — INSULIN ASPART SCH UNIT: 100 INJECTION, SOLUTION INTRAVENOUS; SUBCUTANEOUS at 21:09

## 2018-07-28 RX ADMIN — OXYCODONE HYDROCHLORIDE AND ACETAMINOPHEN SCH MG: 500 TABLET ORAL at 09:17

## 2018-07-28 RX ADMIN — DILTIAZEM HYDROCHLORIDE SCH MG: 60 TABLET, FILM COATED ORAL at 13:07

## 2018-07-28 RX ADMIN — METOPROLOL SUCCINATE SCH MG: 25 TABLET, EXTENDED RELEASE ORAL at 11:48

## 2018-07-28 RX ADMIN — IPRATROPIUM BROMIDE AND ALBUTEROL SULFATE SCH: .5; 3 SOLUTION RESPIRATORY (INHALATION) at 13:30

## 2018-07-28 RX ADMIN — PANTOPRAZOLE SODIUM SCH MG: 40 TABLET, DELAYED RELEASE ORAL at 06:34

## 2018-07-28 RX ADMIN — INSULIN DETEMIR SCH UNIT: 100 INJECTION, SOLUTION SUBCUTANEOUS at 09:20

## 2018-07-28 RX ADMIN — ATORVASTATIN CALCIUM SCH MG: 10 TABLET, FILM COATED ORAL at 09:17

## 2018-07-28 RX ADMIN — FUROSEMIDE SCH MG: 40 TABLET ORAL at 09:18

## 2018-07-28 RX ADMIN — ASPIRIN 81 MG CHEWABLE TABLET SCH MG: 81 TABLET CHEWABLE at 09:17

## 2018-07-28 RX ADMIN — INSULIN ASPART SCH: 100 INJECTION, SOLUTION INTRAVENOUS; SUBCUTANEOUS at 06:21

## 2018-07-28 RX ADMIN — ENOXAPARIN SODIUM SCH MG: 40 INJECTION SUBCUTANEOUS at 09:18

## 2018-07-28 RX ADMIN — IPRATROPIUM BROMIDE AND ALBUTEROL SULFATE SCH ML: .5; 3 SOLUTION RESPIRATORY (INHALATION) at 15:21

## 2018-07-28 RX ADMIN — IPRATROPIUM BROMIDE AND ALBUTEROL SULFATE SCH ML: .5; 3 SOLUTION RESPIRATORY (INHALATION) at 19:28

## 2018-07-28 RX ADMIN — INSULIN ASPART SCH UNIT: 100 INJECTION, SOLUTION INTRAVENOUS; SUBCUTANEOUS at 17:56

## 2018-07-28 RX ADMIN — CEFAZOLIN SCH GM: 10 INJECTION, POWDER, FOR SOLUTION INTRAVENOUS at 15:17

## 2018-07-28 RX ADMIN — DILTIAZEM HYDROCHLORIDE SCH MG: 60 TABLET, FILM COATED ORAL at 21:10

## 2018-07-28 RX ADMIN — Medication SCH UNIT: at 09:17

## 2018-07-28 RX ADMIN — INSULIN ASPART SCH: 100 INJECTION, SOLUTION INTRAVENOUS; SUBCUTANEOUS at 11:44

## 2018-07-28 RX ADMIN — ASPIRIN SCH: 325 TABLET ORAL at 13:19

## 2018-07-28 RX ADMIN — CEFAZOLIN SCH GM: 10 INJECTION, POWDER, FOR SOLUTION INTRAVENOUS at 09:17

## 2018-07-28 RX ADMIN — SERTRALINE HYDROCHLORIDE SCH MG: 100 TABLET ORAL at 09:18

## 2018-07-28 RX ADMIN — CEFAZOLIN SCH GM: 10 INJECTION, POWDER, FOR SOLUTION INTRAVENOUS at 23:22

## 2018-07-28 RX ADMIN — IPRATROPIUM BROMIDE AND ALBUTEROL SULFATE SCH ML: .5; 3 SOLUTION RESPIRATORY (INHALATION) at 09:39

## 2018-07-28 NOTE — P.CRDCN
History of Present Illness


Consult date: 07/28/18


Requesting physician: Geraldine Pabon


Reason for Consult (text): 


elevated troponin





Chief complaint: progressively worsening weakness


History of present illness: 


This is a pleasant 77-year-old female patient who resides in an extended care 

facility.  She is a poor historian with forgetfulness and mild confusion noted.

  Most of the HPI was obtained from the chart.  She has a past medical history 

significant for CAD which she follows with a cardiologist in [place], diabetes

, hyperlipidemia, hypertension, anxiety and depression.  She presented to the 

emergency department brought in by her family due to worsening weakness 

diminishment of her usual everyday activities.  She denied complaints of chest 

discomfort, palpitations, shortness of breath, nausea or syncope.  She also 

complains of lower extremity edema.  EKG on admission showed sinus bradycardia 

with occasional PVCs.  Echocardiogram showed normal LV systolic function with 

an ejection fraction between 55-60%, mildly enlarged RV, moderate to severe 

aortic valve sclerosis, trace to mild aortic regurgitation and mild aortic 

stenosis.  Also showed moderate MR, severe TR and severe pulmonary 

hypertension.  We were consulted to see the patient because of mild elevation 

in troponins of 0.043 and 0.049.  Her vital signs of been stable.  Upon 

examination, patient is resting currently in bed.  She does verbalize being 

forgetful with some confusion at times.  She denies complaints of significant 

weakness at this time.








Past Medical History


Past Medical History: Atrial Fibrillation, Coronary Artery Disease (CAD), 

Diabetes Mellitus, GERD/Reflux, Hyperlipidemia, Hypertension, Osteoarthritis (OA

), Pneumonia, Sleep Apnea/CPAP/BIPAP


Additional Past Medical History / Comment(s): home 02 3 liters atc,lt lower leg 

wound


History of Any Multi-Drug Resistant Organisms: None Reported


Past Surgical History: Orthopedic Surgery, Tonsillectomy


Additional Past Surgical History / Comment(s): dontae cataracts removed has lens 

implants, dontae knee replacments


Past Anesthesia/Blood Transfusion Reactions: Motion Sickness


Additional Past Anesthesia/Blood Transfusion Reaction / Comment(s): 

clausterphobia


Smoking Status: Former smoker





- Past Family History


  ** Father


Family Medical History: Coronary Artery Disease (CAD)


Additional Family Medical History / Comment(s): enlarged heart





  ** Mother


Family Medical History: Dementia, Hypertension


Additional Family Medical History / Comment(s): hysterectomy-d/t benign tumor





Medications and Allergies


 Home Medications











 Medication  Instructions  Recorded  Confirmed  Type


 


ALPRAZolam [Xanax] 0.25 mg PO TID PRN 07/27/18 07/27/18 History


 


Acetaminophen Tab [Tylenol Tab] 650 mg PO Q6H PRN 07/27/18 07/27/18 History


 


Albuterol Inhaler [Ventolin Hfa 2 puff INHALATION RT-Q4H PRN 07/27/18 07/27/18 

History





Inhaler]    


 


Ascorbic Acid [Vitamin C] 500 mg PO DAILY 07/27/18 07/27/18 History


 


Aspirin EC [Ecotrin Low Dose] 81 mg PO DAILY 07/27/18 07/27/18 History


 


Atorvastatin [Lipitor] 10 mg PO DAILY 07/27/18 07/27/18 History


 


Bisacodyl [Dulcolax] 10 mg RECTAL DAILY PRN 07/27/18 07/27/18 History


 


Calcium Carbonate [Tums Ultra 1,177 mg PO TID PRN 07/27/18 07/27/18 History





Strength]    


 


Cholecalciferol (Vitamin D3) 2,000 unit PO DAILY 07/27/18 07/27/18 History





[Vitamin D3]    


 


Diltiazem HCl [Diltiazem 12Hr ER] 60 mg PO Q12HR 07/27/18 07/27/18 History


 


Ferrous Gluconate 324 mg PO MOWEFR 07/27/18 07/27/18 History


 


Fluconazole [Diflucan] 150 mg PO TH 07/27/18 07/27/18 History


 


Furosemide [Lasix] 40 mg PO DAILY 07/27/18 07/27/18 History


 


HYDROcodone/APAP 5-325MG [Norco 1 tab PO Q6HR PRN 07/27/18 07/27/18 History





5-325]    


 


Insulin Glargine,Hum.rec.anlog 16 unit SQ DAILY 07/27/18 07/27/18 History





[Lantus Solostar]    


 


Insulin Lispro [humaLOG Kwikpen] See Protocol SQ ACHS 07/27/18 07/27/18 History


 


Ipratropium-Albuterol Nebulize 3 ml INHALATION RT-QID 07/27/18 07/27/18 History





[Duoneb 0.5 mg-3 mg/3 ml Soln]    


 


Magnesium Hydroxide [Milk of 2,400 mg PO DAILY PRN 07/27/18 07/27/18 History





Magnesia]    


 


Metoprolol Succinate (ER) [Toprol 50 mg PO DAILY 07/27/18 07/27/18 History





Xl]    


 


Nystatin 100,000Unit/gm Cream 1 applic TOPICAL BID PRN 07/27/18 07/27/18 History





[Mycostatin Cream]    


 


Pantoprazole Sodium [Protonix] 40 mg PO DAILY 07/27/18 07/27/18 History


 


Q-Tussin-Dm 10 ml PO Q4H PRN 07/27/18 07/27/18 History


 


Senexon-S 1 tab PO DAILY 07/27/18 07/27/18 History


 


Sertraline [Zoloft] 100 mg PO DAILY 07/27/18 07/27/18 History


 


Temazepam [Restoril] 15 mg PO HS PRN 07/27/18 07/27/18 History











 Allergies











Allergy/AdvReac Type Severity Reaction Status Date / Time


 


Iodinated Contrast- Oral and Allergy  Unknown Verified 07/27/18 11:55





IV Dye     


 


Penicillins Allergy  Unknown Verified 07/27/18 11:55


 


vancomycin Allergy  Unknown Verified 07/27/18 11:55














Physical Exam


Vitals: 


 Vital Signs











  Temp Pulse Pulse Resp BP BP Pulse Ox


 


 07/28/18 09:49   60     


 


 07/28/18 09:39   60     


 


 07/28/18 09:15  97.7 F   58 L  18   130/73  97


 


 07/28/18 03:00  97.4 F L   60  18   136/60  97


 


 07/27/18 23:20  98.3 F   52 L  18   129/56  97


 


 07/27/18 20:17   42 L     


 


 07/27/18 20:05   45 L     


 


 07/27/18 20:00  97.0 F L   60  18   159/71  96


 


 07/27/18 19:26     18   


 


 07/27/18 19:24  98.5 F   75  18   160/55  95


 


 07/27/18 17:04  97.2 F L  58 L   18  147/83   95


 


 07/27/18 15:00   58 L   18  161/72   98


 


 07/27/18 13:00  97.4 F L  54 L   18  133/74   95


 


 07/27/18 12:14   59 L   22  122/62   97


 


 07/27/18 11:21        95


 


 07/27/18 11:11  97.9 F  60   20  121/67   88 L








 Intake and Output











 07/27/18 07/28/18 07/28/18





 22:59 06:59 14:59


 


Intake Total 1000  240


 


Balance 1000  240


 


Intake:   


 


  Amount of Fluid Infused ( 1000  





  ml)   


 


  Oral   240


 


Other:   


 


  # Voids 1 1 


 


  Weight  121 kg 











PHYSICAL EXAMINATION: 





HEENT: Head is atraumatic, normocephalic.  Pupils equal, round.  Neck is 

supple.  There is no elevated jugular venous pressure.





HEART EXAMINATION: Heart sounds regular, S1 and S2 with a systolic murmur.





CHEST EXAMINATION: Lungs reveal diminished air entry bilaterally. No chest wall 

tenderness is noted on palpation or with deep breathing.





ABDOMEN:  Soft, obese, nontender. Bowel sounds are heard. No organomegaly noted.


 


EXTREMITIES: 1+ peripheral pulses with evidence of 1+ peripheral edema and no 

calf tenderness noted. Erythema noted bilaterally.





NEUROLOGIC patient is awake, alert and oriented x2-3.  Memory loss and mild 

confusion noted


 


.


 











Results





 07/28/18 06:59





 07/28/18 06:59


 Cardiac Enzymes











  07/27/18 07/27/18 07/27/18 Range/Units





  11:15 11:15 17:28 


 


AST  38 H    (14-36)  U/L


 


CK-MB (CK-2)   3.1 H*   (0.0-2.4)  ng/mL


 


Troponin I   0.043 H*  0.049 H*  (0.000-0.034)  ng/mL








 Coagulation











  07/27/18 Range/Units





  11:15 


 


PT  11.6  (9.0-12.0)  sec


 


APTT  22.9  (22.0-30.0)  sec








 CBC











  07/27/18 07/28/18 Range/Units





  11:15 06:59 


 


WBC  5.4  4.8  (3.8-10.6)  k/uL


 


RBC  4.29  4.40  (3.80-5.40)  m/uL


 


Hgb  11.3 L  11.2 L  (11.4-16.0)  gm/dL


 


Hct  37.3  38.5  (34.0-46.0)  %


 


Plt Count  202  188  (150-450)  k/uL








 Comprehensive Metabolic Panel











  07/27/18 07/28/18 Range/Units





  11:15 06:59 


 


Sodium  139  142  (137-145)  mmol/L


 


Potassium  4.4  4.4  (3.5-5.1)  mmol/L


 


Chloride  106  109 H  ()  mmol/L


 


Carbon Dioxide  28  24  (22-30)  mmol/L


 


BUN  32 H  33 H  (7-17)  mg/dL


 


Creatinine  0.80  0.98  (0.52-1.04)  mg/dL


 


Glucose  186 H  112 H  (74-99)  mg/dL


 


Calcium  9.1  8.9  (8.4-10.2)  mg/dL


 


AST  38 H   (14-36)  U/L


 


ALT  34   (9-52)  U/L


 


Alkaline Phosphatase  92   ()  U/L


 


Total Protein  6.2 L   (6.3-8.2)  g/dL


 


Albumin  3.4 L   (3.5-5.0)  g/dL








 Current Medications











Generic Name Dose Route Start Last Admin





  Trade Name Freq  PRN Reason Stop Dose Admin


 


Acetaminophen  650 mg  07/27/18 14:39  





  Tylenol Tab  PO   





  Q6HR PRN   





  Fever and/ or Pain   





     





     





     


 


Hydrocodone Bitart/Acetaminophen  1 each  07/27/18 14:40  





  [place] 5-325  PO   





  Q6HR PRN   





  Moderate Pain   





     





     





     


 


Albuterol/Ipratropium  3 ml  07/27/18 16:00  07/28/18 09:39





  Duoneb 0.5 Mg-3 Mg/3 Ml Soln  INHALATION   3 ml





  RT-QID GABRIEL   Administration





     





     





     





     


 


Alprazolam  0.25 mg  07/27/18 14:40  





  Xanax  PO   





  TID PRN   





  Anxiety   





     





     





     


 


Ascorbic Acid  500 mg  07/28/18 09:00  07/28/18 09:17





  Vitamin C  PO   500 mg





  DAILY GABRIEL   Administration





     





     





     





     


 


Aspirin  81 mg  07/28/18 09:00  07/28/18 09:17





  Aspirin  PO   81 mg





  DAILY GABRIEL   Administration





     





     





     





     


 


Atorvastatin Calcium  10 mg  07/28/18 09:00  07/28/18 09:17





  Lipitor  PO   10 mg





  DAILY GABRIEL   Administration





     





     





     





     


 


Calcium Carbonate/Glycine  1,000 mg  07/27/18 14:40  





  Tums  PO   





  TID PRN   





  Indigestion   





     





     





     


 


Cefazolin Sodium  2 gm  07/28/18 08:00  07/28/18 09:17





  Kefzol  IVP   2 gm





  Q8HR GABRIEL   Administration





     





     





     





     


 


Cholecalciferol  2,000 unit  07/28/18 09:00  07/28/18 09:17





  Vitamin D3  PO   2,000 unit





  DAILY GABRIEL   Administration





     





     





     





     


 


Enoxaparin Sodium  40 mg  07/28/18 09:00  07/28/18 09:18





  Lovenox  SQ   40 mg





  DAILY GABRIEL   Administration





     





     





     





     


 


Furosemide  40 mg  07/28/18 09:00  07/28/18 09:18





  Lasix  PO   40 mg





  DAILY GABRIEL   Administration





     





     





     





     


 


Insulin Aspart  0 unit  07/27/18 17:30  07/28/18 06:21





  Novolog  SQ   Not Given





  [facility]   





     





     





  Protocol   





     


 


Insulin Detemir  16 unit  07/28/18 09:00  07/28/18 09:20





  Levemir  SQ   16 unit





  DAILY GABRIEL   Administration





     





     





     





     


 


Metoprolol Succinate  25 mg  07/28/18 09:00  





  Toprol Xl  PO   





  DAILY [facility]   





     





     





     





     


 


Miscellaneous Information  1 each  07/27/18 14:45  





  Potassium Per Protocol  MISCELLANE   





  DAILY PRN   





  Per Protocol   





     





  Protocol   





     


 


Diltiazem Hcl [  60 mg  07/27/18 21:00  07/27/18 21:43





  Diltiazem 12hr Er]  PO   Not Given





  Q12HR [facility]   





     





     





     





     


 


Nystatin  1 applic  07/27/18 14:40  





  Mycostatin Cream  TOPICAL   





  BID PRN   





  [facility]ash   





     





     





     


 


Ondansetron HCl  4 mg  07/27/18 14:39  





  Zofran  IVP   





  Q6HR PRN   





  Nausea And Vomiting   





     





     





     


 


Pantoprazole Sodium  40 mg  07/28/18 07:30  07/28/18 06:34





  Protonix  PO   40 mg





  AC-[id number] GABRIEL   Administration





     





     





     





     


 


Sertraline HCl  100 mg  07/28/18 09:00  07/28/18 09:18





  Zoloft  PO   100 mg





  DAILY GABRIEL   Administration





     





     





     





     








 Intake and Output











 07/27/18 07/28/18 07/28/18





 22:59 06:59 14:59


 


Intake Total 1000  240


 


Balance 1000  240


 


Intake:   


 


  Amount of Fluid Infused ( 1000  





  ml)   


 


  Oral   240


 


Other:   


 


  # Voids 1 1 


 


  Weight  121 kg 








 





 07/28/18 06:59 





 07/28/18 06:59 











Assessment and Plan


Assessment: 


#1 altered mental status and weakness


#2 UTI


#3 cellulitis


#4 history of CAD with no prior cardiac catheterization


#5 mild troponin leak of uncertain significance


# 6 COPD


# 7 severe pulmonary hypertension





Plan: 


From cardiology 's perspective, we will treat the patient medically at this 

time.  Optimize medical therapy.  Further recommendations to follow.





NP note has been reviewed, I agree with a documented findings and plan of care.

  Patient was seen and examined.

## 2018-07-28 NOTE — P.PN
Subjective


Progress Note Date: 07/28/18


Principal diagnosis: 





UTI and bradycardia





Patient is doing a lot better today.  Her mentation is back to baseline.  No 

acute events overnight.





Objective





- Vital Signs


Vital signs: 


 Vital Signs











Temp  97.7 F   07/28/18 09:15


 


Pulse  60   07/28/18 09:49


 


Resp  18   07/28/18 09:15


 


BP  130/73   07/28/18 09:15


 


Pulse Ox  97   07/28/18 09:15








 Intake & Output











 07/27/18 07/28/18 07/28/18





 18:59 06:59 18:59


 


Intake Total 1000  240


 


Balance 1000  240


 


Weight 113.398 kg 121 kg 


 


Intake:   


 


  Amount of Fluid Infused ( 1000  





  ml)   


 


  Oral   240


 


Other:   


 


  # Voids  1 














- Exam





General:  The patient is awake and alert, in no distress


Eye: there is normal conjunctiva bilaterally.  


Neck:  The neck is supple, there is no  JVD.  


Cardiovascular:  Normal  S1-S2, no S3-S4, no murmurs.


Respiratory: Lungs clear to auscultation bilaterally


Gastrointestinal: Abdomen is soft, nontender


Neurological:. Speech is normal. 


Skin:  Skin is warm and dry 





- Labs


CBC & Chem 7: 


 07/28/18 06:59





 07/28/18 06:59


Labs: 


 Abnormal Lab Results - Last 24 Hours (Table)











  07/27/18 07/27/18 07/27/18 Range/Units





  11:15 11:15 11:15 


 


Hgb    11.3 L  (11.4-16.0)  gm/dL


 


MCHC    30.3 L  (31.0-37.0)  g/dL


 


Lymphocytes #    0.8 L  (1.0-4.8)  k/uL


 


INR     (<1.2)  


 


Chloride     ()  mmol/L


 


BUN  32 H    (7-17)  mg/dL


 


Glucose  186 H    (74-99)  mg/dL


 


POC Glucose (mg/dL)     (75-99)  mg/dL


 


Magnesium  1.5 L    (1.6-2.3)  mg/dL


 


AST  38 H    (14-36)  U/L


 


Total Creatine Kinase   136 H   ()  U/L


 


CK-MB (CK-2)   3.1 H*   (0.0-2.4)  ng/mL


 


Troponin I   0.043 H*   (0.000-0.034)  ng/mL


 


Total Protein  6.2 L    (6.3-8.2)  g/dL


 


Albumin  3.4 L    (3.5-5.0)  g/dL


 


Urine Nitrite     (Negative)  


 


Urine Mucus     (None)  /hpf














  07/27/18 07/27/18 07/27/18 Range/Units





  11:15 11:49 15:47 


 


Hgb     (11.4-16.0)  gm/dL


 


MCHC     (31.0-37.0)  g/dL


 


Lymphocytes #     (1.0-4.8)  k/uL


 


INR  1.2 H    (<1.2)  


 


Chloride     ()  mmol/L


 


BUN     (7-17)  mg/dL


 


Glucose     (74-99)  mg/dL


 


POC Glucose (mg/dL)    151 H  (75-99)  mg/dL


 


Magnesium     (1.6-2.3)  mg/dL


 


AST     (14-36)  U/L


 


Total Creatine Kinase     ()  U/L


 


CK-MB (CK-2)     (0.0-2.4)  ng/mL


 


Troponin I     (0.000-0.034)  ng/mL


 


Total Protein     (6.3-8.2)  g/dL


 


Albumin     (3.5-5.0)  g/dL


 


Urine Nitrite   Positive H   (Negative)  


 


Urine Mucus   Rare H   (None)  /hpf














  07/27/18 07/27/18 07/28/18 Range/Units





  17:28 20:48 05:59 


 


Hgb     (11.4-16.0)  gm/dL


 


MCHC     (31.0-37.0)  g/dL


 


Lymphocytes #     (1.0-4.8)  k/uL


 


INR     (<1.2)  


 


Chloride     ()  mmol/L


 


BUN     (7-17)  mg/dL


 


Glucose     (74-99)  mg/dL


 


POC Glucose (mg/dL)   175 H  124 H  (75-99)  mg/dL


 


Magnesium     (1.6-2.3)  mg/dL


 


AST     (14-36)  U/L


 


Total Creatine Kinase     ()  U/L


 


CK-MB (CK-2)     (0.0-2.4)  ng/mL


 


Troponin I  0.049 H*    (0.000-0.034)  ng/mL


 


Total Protein     (6.3-8.2)  g/dL


 


Albumin     (3.5-5.0)  g/dL


 


Urine Nitrite     (Negative)  


 


Urine Mucus     (None)  /hpf














  07/28/18 07/28/18 07/28/18 Range/Units





  06:59 06:59 08:04 


 


Hgb   11.2 L   (11.4-16.0)  gm/dL


 


MCHC   29.1 L   (31.0-37.0)  g/dL


 


Lymphocytes #     (1.0-4.8)  k/uL


 


INR     (<1.2)  


 


Chloride  109 H    ()  mmol/L


 


BUN  33 H    (7-17)  mg/dL


 


Glucose  112 H    (74-99)  mg/dL


 


POC Glucose (mg/dL)    137 H  (75-99)  mg/dL


 


Magnesium  1.5 L    (1.6-2.3)  mg/dL


 


AST     (14-36)  U/L


 


Total Creatine Kinase     ()  U/L


 


CK-MB (CK-2)     (0.0-2.4)  ng/mL


 


Troponin I     (0.000-0.034)  ng/mL


 


Total Protein     (6.3-8.2)  g/dL


 


Albumin     (3.5-5.0)  g/dL


 


Urine Nitrite     (Negative)  


 


Urine Mucus     (None)  /hpf








 Microbiology - Last 24 Hours (Table)











 07/27/18 11:49 Urine Culture - Preliminary





 Urine,Catheterized 














Assessment and Plan


Assessment: 





1.  Sinus bradycardia, may be attributed to Cardizem and Toprol-XL use.  I 

decreased her Toprol-XL dose from 50-25 mg daily.  I would continue telemetry 

monitoring. Thyroid function test checked and normal.  Awaiting cardiology 

evaluation.





2.  Elevated troponin: Most likely non-thrombotic troponin leak.  Patient 

denies any chest pain.  12-lead ECG showed no acute ischemic changes. 

Echocardiogram showed preserved ejection fraction and no significant valvular 

abnormalities.  





3.  Non-complicated urinary tract infection, given 1 dose of IV ceftriaxone in 

the emergency room.  Antibiotic switched to IV cefazolin.  Awaiting urine 

culture.





4.  Bilateral lower extremity cellulitis/chronic venous stasis: Continue IV 

cefazolin.  Infectious disease consulted following 





5.  Severe pulmonary hypertension, severe tricuspid regurgitation, moderate 

mitral valve regurgitation, noted on echocardiogram of the heart: Patient follow

-up closely with her own cardiologist in [facility].  Advised to follow-

up as an outpatient.  





6.  Morbid obesity





7.  Mixed hyperlipidemia on Lipitor





8.  Generalized anxiety disorder, maintained on Xanax 3 times a day.  I would 

discontinue nighttime Restoril





9.  Major depressive disorder: Continue home medication





10.  Physical debility, PT/OT/ consulted.  Patient may benefit 

from subacute rehab





11. Type II diabetes mellitus, continue home dose of insulin.  Add sliding 

scale.  A1c ordered 





12.  Obstructive sleep apnea on CPAP at home.  Awaiting patient's family to 

bring her device 





Plan for today:


-Awaiting cardiology evaluation


-Awaiting PT/OT/ evaluation patient may benefit from placement on 

Monday


-Repeat lab work in the morning

## 2018-07-29 LAB
ANION GAP SERPL CALC-SCNC: 5 MMOL/L
BASOPHILS # BLD AUTO: 0.1 K/UL (ref 0–0.2)
BASOPHILS NFR BLD AUTO: 1 %
BUN SERPL-SCNC: 34 MG/DL (ref 7–17)
CALCIUM SPEC-MCNC: 9.1 MG/DL (ref 8.4–10.2)
CHLORIDE SERPL-SCNC: 107 MMOL/L (ref 98–107)
CO2 SERPL-SCNC: 30 MMOL/L (ref 22–30)
EOSINOPHIL # BLD AUTO: 0.3 K/UL (ref 0–0.7)
EOSINOPHIL NFR BLD AUTO: 5 %
ERYTHROCYTE [DISTWIDTH] IN BLOOD BY AUTOMATED COUNT: 4.28 M/UL (ref 3.8–5.4)
ERYTHROCYTE [DISTWIDTH] IN BLOOD: 15.4 % (ref 11.5–15.5)
GLUCOSE BLD-MCNC: 106 MG/DL (ref 75–99)
GLUCOSE BLD-MCNC: 126 MG/DL (ref 75–99)
GLUCOSE BLD-MCNC: 129 MG/DL (ref 75–99)
GLUCOSE BLD-MCNC: 155 MG/DL (ref 75–99)
GLUCOSE BLD-MCNC: 172 MG/DL (ref 75–99)
GLUCOSE SERPL-MCNC: 103 MG/DL (ref 74–99)
HCT VFR BLD AUTO: 36.5 % (ref 34–46)
HGB BLD-MCNC: 10.8 GM/DL (ref 11.4–16)
LYMPHOCYTES # SPEC AUTO: 0.9 K/UL (ref 1–4.8)
LYMPHOCYTES NFR SPEC AUTO: 18 %
MAGNESIUM SPEC-SCNC: 1.5 MG/DL (ref 1.6–2.3)
MCH RBC QN AUTO: 25.3 PG (ref 25–35)
MCHC RBC AUTO-ENTMCNC: 29.7 G/DL (ref 31–37)
MCV RBC AUTO: 85.1 FL (ref 80–100)
MONOCYTES # BLD AUTO: 0.4 K/UL (ref 0–1)
MONOCYTES NFR BLD AUTO: 9 %
NEUTROPHILS # BLD AUTO: 3.2 K/UL (ref 1.3–7.7)
NEUTROPHILS NFR BLD AUTO: 65 %
PLATELET # BLD AUTO: 185 K/UL (ref 150–450)
POTASSIUM SERPL-SCNC: 4.2 MMOL/L (ref 3.5–5.1)
SODIUM SERPL-SCNC: 142 MMOL/L (ref 137–145)
WBC # BLD AUTO: 5 K/UL (ref 3.8–10.6)

## 2018-07-29 RX ADMIN — DILTIAZEM HYDROCHLORIDE SCH MG: 30 TABLET, FILM COATED ORAL at 20:50

## 2018-07-29 RX ADMIN — ATORVASTATIN CALCIUM SCH MG: 10 TABLET, FILM COATED ORAL at 07:52

## 2018-07-29 RX ADMIN — IPRATROPIUM BROMIDE AND ALBUTEROL SULFATE SCH ML: .5; 3 SOLUTION RESPIRATORY (INHALATION) at 15:27

## 2018-07-29 RX ADMIN — FUROSEMIDE SCH MG: 40 TABLET ORAL at 07:52

## 2018-07-29 RX ADMIN — ASPIRIN 81 MG CHEWABLE TABLET SCH MG: 81 TABLET CHEWABLE at 07:52

## 2018-07-29 RX ADMIN — IPRATROPIUM BROMIDE AND ALBUTEROL SULFATE SCH ML: .5; 3 SOLUTION RESPIRATORY (INHALATION) at 19:42

## 2018-07-29 RX ADMIN — INSULIN ASPART SCH UNIT: 100 INJECTION, SOLUTION INTRAVENOUS; SUBCUTANEOUS at 17:41

## 2018-07-29 RX ADMIN — IPRATROPIUM BROMIDE AND ALBUTEROL SULFATE SCH ML: .5; 3 SOLUTION RESPIRATORY (INHALATION) at 06:58

## 2018-07-29 RX ADMIN — DILTIAZEM HYDROCHLORIDE SCH: 30 TABLET, FILM COATED ORAL at 14:50

## 2018-07-29 RX ADMIN — INSULIN DETEMIR SCH UNIT: 100 INJECTION, SOLUTION SUBCUTANEOUS at 10:01

## 2018-07-29 RX ADMIN — INSULIN ASPART SCH: 100 INJECTION, SOLUTION INTRAVENOUS; SUBCUTANEOUS at 12:01

## 2018-07-29 RX ADMIN — ENOXAPARIN SODIUM SCH MG: 40 INJECTION SUBCUTANEOUS at 07:53

## 2018-07-29 RX ADMIN — INSULIN ASPART SCH UNIT: 100 INJECTION, SOLUTION INTRAVENOUS; SUBCUTANEOUS at 20:49

## 2018-07-29 RX ADMIN — CEPHALEXIN SCH MG: 500 CAPSULE ORAL at 20:50

## 2018-07-29 RX ADMIN — DILTIAZEM HYDROCHLORIDE SCH MG: 60 TABLET, FILM COATED ORAL at 12:38

## 2018-07-29 RX ADMIN — IPRATROPIUM BROMIDE AND ALBUTEROL SULFATE SCH ML: .5; 3 SOLUTION RESPIRATORY (INHALATION) at 10:25

## 2018-07-29 RX ADMIN — OXYCODONE HYDROCHLORIDE AND ACETAMINOPHEN SCH MG: 500 TABLET ORAL at 07:52

## 2018-07-29 RX ADMIN — SERTRALINE HYDROCHLORIDE SCH MG: 100 TABLET ORAL at 07:52

## 2018-07-29 RX ADMIN — METOPROLOL SUCCINATE SCH MG: 25 TABLET, EXTENDED RELEASE ORAL at 07:52

## 2018-07-29 RX ADMIN — PANTOPRAZOLE SODIUM SCH MG: 40 TABLET, DELAYED RELEASE ORAL at 06:51

## 2018-07-29 RX ADMIN — CEFAZOLIN SCH: 10 INJECTION, POWDER, FOR SOLUTION INTRAVENOUS at 14:59

## 2018-07-29 RX ADMIN — CEPHALEXIN SCH MG: 500 CAPSULE ORAL at 16:06

## 2018-07-29 RX ADMIN — Medication SCH UNIT: at 07:52

## 2018-07-29 RX ADMIN — INSULIN ASPART SCH: 100 INJECTION, SOLUTION INTRAVENOUS; SUBCUTANEOUS at 05:55

## 2018-07-29 NOTE — P.PN
Subjective





Mrs. Virgen was seen and examined resting comfortably in bed doing and 

nebulizer treatment. She is being followed for mild troponin leak. Currently 

being treated for urinary tract infection, cellulitis and generalized weakness. 

Troponin leak not felt to be indicative of an acute coronary event. She denies 

symptoms of chest pain, shortness of breath, palpitations, dizziness, nausea, 

vomiting or diaphoresis.  Laboratory data reviewed, hemoglobin 10.8, platelets 

185, sodium 142, potassium 4.2, creatinine 0.9 and magnesium 1.5.  Blood 

pressure 133/76 heart rate 74 afebrile maintaining oxygen saturation on nasal 

cannula.





Objective





- Vital Signs


Vital signs: 


 Vital Signs











Temp  97.7 F   07/29/18 07:50


 


Pulse  74   07/29/18 10:40


 


Resp  18   07/29/18 07:50


 


BP  133/76   07/29/18 07:50


 


Pulse Ox  98   07/29/18 07:50








 Intake & Output











 07/28/18 07/29/18 07/29/18





 18:59 06:59 18:59


 


Intake Total 1040  240


 


Output Total  100 


 


Balance 1040 -100 240


 


Weight  120.1 kg 


 


Intake:   


 


  IV 20  


 


    Invasive Line 1 10  


 


    Invasive Line 2 10  


 


  Oral 1020  240


 


Output:   


 


  Urine  100 


 


Other:   


 


  Voiding Method   Bedside Commode





   Diaper





   Incontinent


 


  # Voids 3 1 


 


  # Bowel Movements 1  














- Exam





GENERAL: Well-appearing, well-nourished and in no acute distress.  Morbidly 

obese.


NECK: Supple with minimal JVD, no thyromegaly.


LUNGS: Breath sounds clear to auscultation bilaterally. Respiration equal and 

unlabored.  No wheezes, rales or rhonchi.  Diminished bilaterally.


HEART: Regular rate and rhythm with systolic ejection murmur at the base, no 

rubs or gallops. S1 and S2 heard.


EXTREMITIES: Normal range of motion, trace bilateral lower extremity edema with 

discoloration and erythema.  No clubbing or cyanosis. Peripheral pulses intact.





- Labs


CBC & Chem 7: 


 07/29/18 06:21





 07/29/18 06:21


Labs: 


 Abnormal Lab Results - Last 24 Hours (Table)











  07/28/18 07/28/18 07/28/18 Range/Units





  06:59 16:39 21:04 


 


Hgb     (11.4-16.0)  gm/dL


 


MCHC     (31.0-37.0)  g/dL


 


Lymphocytes #     (1.0-4.8)  k/uL


 


BUN     (7-17)  mg/dL


 


Glucose     (74-99)  mg/dL


 


POC Glucose (mg/dL)   200 H  153 H  (75-99)  mg/dL


 


Hemoglobin A1c  8.1 H    (4.0-6.0)  %


 


Magnesium     (1.6-2.3)  mg/dL














  07/29/18 07/29/18 07/29/18 Range/Units





  05:49 06:21 06:21 


 


Hgb   10.8 L   (11.4-16.0)  gm/dL


 


MCHC   29.7 L   (31.0-37.0)  g/dL


 


Lymphocytes #   0.9 L   (1.0-4.8)  k/uL


 


BUN    34 H  (7-17)  mg/dL


 


Glucose    103 H  (74-99)  mg/dL


 


POC Glucose (mg/dL)  106 H    (75-99)  mg/dL


 


Hemoglobin A1c     (4.0-6.0)  %


 


Magnesium    1.5 L  (1.6-2.3)  mg/dL














  07/29/18 07/29/18 Range/Units





  10:03 11:26 


 


Hgb    (11.4-16.0)  gm/dL


 


MCHC    (31.0-37.0)  g/dL


 


Lymphocytes #    (1.0-4.8)  k/uL


 


BUN    (7-17)  mg/dL


 


Glucose    (74-99)  mg/dL


 


POC Glucose (mg/dL)  129 H  126 H  (75-99)  mg/dL


 


Hemoglobin A1c    (4.0-6.0)  %


 


Magnesium    (1.6-2.3)  mg/dL








 Microbiology - Last 24 Hours (Table)











 07/27/18 11:49 Urine Culture - Final





 Urine,Catheterized    Klebsiella pneumoniae














Assessment and Plan


Assessment: 





ASSESSMENT


Altered mental status and weakness


Urinary tract infection


Cellulitis


Mild troponin leak, not indicative of acute coronary event


COPD


Pulmonary hypertension, RVSP 78.55 mmHg


Hypomagnesemia





PLAN


Continue current medical therapy.  


Follow-up with her primary cardiologist upon discharge.


We will continue to follow as needed.  Please feel free to call with questions 

or concerns.





Nurse Practitioner note has been reviewed, I agree with a documented findings 

and plan of care.  Patient was seen and examined.

## 2018-07-29 NOTE — PN
PROGRESS NOTE



DATE OF SERVICE:

07/29/2018.



REASON FOR FOLLOWUP:

Pneumonia and UTI.



INTERVAL HISTORY:

The patient is afebrile.  She is more awake and alert.  She is breathing comfortably.

Denies significant chest pain or cough.  No abdominal pain and no diarrhea.



EXAMINATION:

Blood pressure 126/76, pulse of 74, temperature of 97.1, she is 96% on 3 L nasal

cannula.



GENERAL DESCRIPTION:

GENERAL: An elderly female, up in the bed, in no distress.

RESPIRATORY SYSTEM: Unlabored breathing.

LUNGS: Clear to auscultation anteriorly.

HEART: S1, S2.  Regular rate and rhythm.

ABDOMEN:  Soft.

EXTREMITIES:  No edema of the feet.



LABS:

Hemoglobin is 10.8, white count 5.0, BUN of 34, creatinine 0.90.  Lab culture finalized

with Klebsiella pneumoniae that is sensitive to ceftriaxone and cefazolin.



DIAGNOSTIC IMPRESSION AND PLAN:

Patient admitted to the hospital with generalized weakness, multifactorial.  This

patient did have a component of Klebsiella UTI along with possible cellulitis to the

left flank.  The patient seemed to have shown clinical improvement.  Antibiotic has

been transitioned to oral Keflex, that should continue for another week to finish a

course of therapy.  Continue supportive care.





MMODL / IJN: [id number] / Job#: [id number]

## 2018-07-29 NOTE — P.PN
Subjective


Progress Note Date: 07/29/18


Principal diagnosis: 





UTI and bradycardia





Patient is doing well today.  She still bradycardic with heart rate mostly in 

the low 50s on the heart monitor.  She reports feeling weak.  No acute events 

overnight.





Objective





- Vital Signs


Vital signs: 


 Vital Signs











Temp  97.6 F   07/29/18 12:36


 


Pulse  60   07/29/18 12:36


 


Resp  18   07/29/18 12:36


 


BP  124/78   07/29/18 12:36


 


Pulse Ox  98   07/29/18 12:36








 Intake & Output











 07/28/18 07/29/18 07/29/18





 18:59 06:59 18:59


 


Intake Total 1040  480


 


Output Total  100 300


 


Balance 1040 -100 180


 


Weight  120.1 kg 


 


Intake:   


 


  IV 20  


 


    Invasive Line 1 10  


 


    Invasive Line 2 10  


 


  Oral 1020  480


 


Output:   


 


  Urine  100 300


 


Other:   


 


  Voiding Method   Bedside Commode





   Diaper





   Incontinent


 


  # Voids 3 1 1


 


  # Bowel Movements 1  1














- Exam





General:  The patient is awake and alert, in no distress


Eye: there is normal conjunctiva bilaterally.  


Neck:  The neck is supple, there is no  JVD.  


Cardiovascular:  Normal  S1-S2, no S3-S4, no murmurs.


Respiratory: Lungs clear to auscultation bilaterally


Gastrointestinal: Abdomen is soft, nontender


Neurological:. Speech is normal. 


Skin:  Skin is warm and dry 





- Labs


CBC & Chem 7: 


 07/29/18 06:21





 07/29/18 06:21


Labs: 


 Abnormal Lab Results - Last 24 Hours (Table)











  07/28/18 07/28/18 07/28/18 Range/Units





  06:59 16:39 21:04 


 


Hgb     (11.4-16.0)  gm/dL


 


MCHC     (31.0-37.0)  g/dL


 


Lymphocytes #     (1.0-4.8)  k/uL


 


BUN     (7-17)  mg/dL


 


Glucose     (74-99)  mg/dL


 


POC Glucose (mg/dL)   200 H  153 H  (75-99)  mg/dL


 


Hemoglobin A1c  8.1 H    (4.0-6.0)  %


 


Magnesium     (1.6-2.3)  mg/dL














  07/29/18 07/29/18 07/29/18 Range/Units





  05:49 06:21 06:21 


 


Hgb   10.8 L   (11.4-16.0)  gm/dL


 


MCHC   29.7 L   (31.0-37.0)  g/dL


 


Lymphocytes #   0.9 L   (1.0-4.8)  k/uL


 


BUN    34 H  (7-17)  mg/dL


 


Glucose    103 H  (74-99)  mg/dL


 


POC Glucose (mg/dL)  106 H    (75-99)  mg/dL


 


Hemoglobin A1c     (4.0-6.0)  %


 


Magnesium    1.5 L  (1.6-2.3)  mg/dL














  07/29/18 07/29/18 Range/Units





  10:03 11:26 


 


Hgb    (11.4-16.0)  gm/dL


 


MCHC    (31.0-37.0)  g/dL


 


Lymphocytes #    (1.0-4.8)  k/uL


 


BUN    (7-17)  mg/dL


 


Glucose    (74-99)  mg/dL


 


POC Glucose (mg/dL)  129 H  126 H  (75-99)  mg/dL


 


Hemoglobin A1c    (4.0-6.0)  %


 


Magnesium    (1.6-2.3)  mg/dL








 Microbiology - Last 24 Hours (Table)











 07/27/18 11:49 Urine Culture - Final





 Urine,Catheterized    Klebsiella pneumoniae














Assessment and Plan


Assessment: 





1.  Sinus bradycardia, may be attributed to Cardizem and Toprol-XL use.  On 

admission, I decreased her Toprol-XL dose from 50-25 mg daily.  Today I would 

change Cardizem from 60 mg twice a day to 30 mg 3 times a day.  I would 

continue telemetry monitoring. Thyroid function test checked and normal.  

Patient was seen and evaluated by cardiology.





2.  Elevated troponin: Most likely non-thrombotic troponin leak.  Patient 

denies any chest pain.  12-lead ECG showed no acute ischemic changes. 

Echocardiogram showed preserved ejection fraction and no significant valvular 

abnormalities.  





3.  Non-complicated urinary tract infection, given 1 dose of IV ceftriaxone in 

the emergency room.  Antibiotic switched to IV cefazolin. Urine culture showed 

Klebsiella pneumonia susceptible to cefazolin.  We will change antibiotic to 

Keflex on discharge.





4.  Bilateral lower extremity cellulitis/chronic venous stasis: Continue IV 

cefazolin.  Infectious disease consulted following 





5.  Severe pulmonary hypertension, severe tricuspid regurgitation, moderate 

mitral valve regurgitation, noted on echocardiogram of the heart: Patient follow

-up closely with her own cardiologist in [facility].  Advised to follow-

up as an outpatient.  





6.  Morbid obesity





7.  Mixed hyperlipidemia on Lipitor





8.  Generalized anxiety disorder, maintained on Xanax 3 times a day.  I would 

discontinue nighttime Restoril





9.  Major depressive disorder: Continue home medication





10.  Physical debility, PT/OT/ consulted.  Patient may benefit 

from subacute rehab





11. Type II diabetes mellitus, continue home dose of insulin. sliding scale.  

A1c 8.1





12.  Obstructive sleep apnea on CPAP at home.  Awaiting patient's family to 

bring her device 





Plan for today:


-Continue telemetry monitoring and monitor for bradycardia


-Plan for discharge to subacute rehab tomorrow patient preference [facility]

## 2018-07-30 VITALS — HEART RATE: 76 BPM

## 2018-07-30 VITALS — DIASTOLIC BLOOD PRESSURE: 64 MMHG | SYSTOLIC BLOOD PRESSURE: 144 MMHG

## 2018-07-30 VITALS — RESPIRATION RATE: 16 BRPM

## 2018-07-30 VITALS — TEMPERATURE: 97.2 F

## 2018-07-30 LAB
ANION GAP SERPL CALC-SCNC: 4 MMOL/L
BASOPHILS # BLD AUTO: 0.1 K/UL (ref 0–0.2)
BASOPHILS NFR BLD AUTO: 1 %
BUN SERPL-SCNC: 28 MG/DL (ref 7–17)
CALCIUM SPEC-MCNC: 9.4 MG/DL (ref 8.4–10.2)
CHLORIDE SERPL-SCNC: 104 MMOL/L (ref 98–107)
CO2 SERPL-SCNC: 33 MMOL/L (ref 22–30)
EOSINOPHIL # BLD AUTO: 0.3 K/UL (ref 0–0.7)
EOSINOPHIL NFR BLD AUTO: 5 %
ERYTHROCYTE [DISTWIDTH] IN BLOOD BY AUTOMATED COUNT: 4.34 M/UL (ref 3.8–5.4)
ERYTHROCYTE [DISTWIDTH] IN BLOOD: 15.6 % (ref 11.5–15.5)
GLUCOSE BLD-MCNC: 129 MG/DL (ref 75–99)
GLUCOSE BLD-MCNC: 98 MG/DL (ref 75–99)
GLUCOSE SERPL-MCNC: 80 MG/DL (ref 74–99)
HCT VFR BLD AUTO: 36.9 % (ref 34–46)
HGB BLD-MCNC: 11.1 GM/DL (ref 11.4–16)
LYMPHOCYTES # SPEC AUTO: 1 K/UL (ref 1–4.8)
LYMPHOCYTES NFR SPEC AUTO: 22 %
MAGNESIUM SPEC-SCNC: 1.4 MG/DL (ref 1.6–2.3)
MCH RBC QN AUTO: 25.5 PG (ref 25–35)
MCHC RBC AUTO-ENTMCNC: 30 G/DL (ref 31–37)
MCV RBC AUTO: 85 FL (ref 80–100)
MONOCYTES # BLD AUTO: 0.4 K/UL (ref 0–1)
MONOCYTES NFR BLD AUTO: 8 %
NEUTROPHILS # BLD AUTO: 2.9 K/UL (ref 1.3–7.7)
NEUTROPHILS NFR BLD AUTO: 62 %
PLATELET # BLD AUTO: 203 K/UL (ref 150–450)
POTASSIUM SERPL-SCNC: 4.4 MMOL/L (ref 3.5–5.1)
SODIUM SERPL-SCNC: 141 MMOL/L (ref 137–145)
WBC # BLD AUTO: 4.7 K/UL (ref 3.8–10.6)

## 2018-07-30 RX ADMIN — ASPIRIN 81 MG CHEWABLE TABLET SCH MG: 81 TABLET CHEWABLE at 08:44

## 2018-07-30 RX ADMIN — CEPHALEXIN SCH MG: 500 CAPSULE ORAL at 08:45

## 2018-07-30 RX ADMIN — MAGNESIUM SULFATE IN DEXTROSE SCH: 10 INJECTION, SOLUTION INTRAVENOUS at 10:13

## 2018-07-30 RX ADMIN — PANTOPRAZOLE SODIUM SCH MG: 40 TABLET, DELAYED RELEASE ORAL at 07:04

## 2018-07-30 RX ADMIN — ENOXAPARIN SODIUM SCH: 40 INJECTION SUBCUTANEOUS at 08:46

## 2018-07-30 RX ADMIN — DILTIAZEM HYDROCHLORIDE SCH MG: 30 TABLET, FILM COATED ORAL at 08:46

## 2018-07-30 RX ADMIN — INSULIN ASPART SCH: 100 INJECTION, SOLUTION INTRAVENOUS; SUBCUTANEOUS at 06:56

## 2018-07-30 RX ADMIN — MAGNESIUM SULFATE IN DEXTROSE SCH: 10 INJECTION, SOLUTION INTRAVENOUS at 10:12

## 2018-07-30 RX ADMIN — IPRATROPIUM BROMIDE AND ALBUTEROL SULFATE SCH ML: .5; 3 SOLUTION RESPIRATORY (INHALATION) at 15:17

## 2018-07-30 RX ADMIN — SERTRALINE HYDROCHLORIDE SCH MG: 100 TABLET ORAL at 08:47

## 2018-07-30 RX ADMIN — IPRATROPIUM BROMIDE AND ALBUTEROL SULFATE SCH ML: .5; 3 SOLUTION RESPIRATORY (INHALATION) at 13:08

## 2018-07-30 RX ADMIN — ATORVASTATIN CALCIUM SCH MG: 10 TABLET, FILM COATED ORAL at 08:45

## 2018-07-30 RX ADMIN — Medication SCH UNIT: at 08:45

## 2018-07-30 RX ADMIN — OXYCODONE HYDROCHLORIDE AND ACETAMINOPHEN SCH MG: 500 TABLET ORAL at 08:44

## 2018-07-30 RX ADMIN — INSULIN ASPART SCH: 100 INJECTION, SOLUTION INTRAVENOUS; SUBCUTANEOUS at 11:54

## 2018-07-30 RX ADMIN — FUROSEMIDE SCH MG: 40 TABLET ORAL at 08:46

## 2018-07-30 RX ADMIN — INSULIN DETEMIR SCH UNIT: 100 INJECTION, SOLUTION SUBCUTANEOUS at 08:50

## 2018-07-30 RX ADMIN — ENOXAPARIN SODIUM SCH MG: 40 INJECTION SUBCUTANEOUS at 09:03

## 2018-07-30 RX ADMIN — IPRATROPIUM BROMIDE AND ALBUTEROL SULFATE SCH ML: .5; 3 SOLUTION RESPIRATORY (INHALATION) at 08:04

## 2018-07-30 RX ADMIN — METOPROLOL SUCCINATE SCH MG: 25 TABLET, EXTENDED RELEASE ORAL at 08:47

## 2018-07-30 NOTE — PN
PROGRESS NOTE



DATE OF SERVICE:

07/30/2018.



REASON FOR FOLLOW UP:

1. UTI.

2. Left leg cellulitis.



INTERVAL HISTORY:

The patient is afebrile.  She is breathing comfortably.  Denies having any chest pain

or shortness of breath or cough. No abdominal pain. Left leg swelling and redness have

resolved.  Currently no open wound.



EXAMINATION:

Blood pressure 136/76, pulse of 70, temperature 97.2.  She is 94% on 2 L nasal cannula.

General description is an elderly female up in the bed in no distress.

RESPIRATORY SYSTEM:  Unlabored breathing with decreased breath sounds at bases. No

wheeze.

HEART: S1, S2.  Regular rate and rhythm.

ABDOMEN: Soft, no tenderness.



LABS:

Hemoglobin 11.1, white count 4.7, BUN of 28, creatinine 0.70.



DIAGNOSTIC IMPRESSION AND PLAN:

Patient with Klebsiella pneumoniae urinary tract infection and left lower extremity

cellulitis.  The patient showed clinical improvement to finish therapy with oral Keflex

500 mg t.i.d. for about 4-5 days.  Continue supportive care.





MMODL / MADANN: [id number] / Job#: [id number]

## 2018-07-30 NOTE — P.DS
Providers


Date of admission: 


07/27/18 13:55





Expected date of discharge: 07/30/18


Attending physician: 


Geraldine Pabon





Consults: 





 





07/27/18 14:41


Consult Physician Routine 


   Consulting Provider: Bobby Powell


   Consult Reason/Comments: UTI, cellulitis of the lower extremity


   Do you want consulting provider notified?: Yes





07/27/18 14:43


Consult Physician Routine 


   Consulting Provider: Juanito Leggett


   Consult Reason/Comments: Elevated troponin


   Do you want consulting provider notified?: Yes











Primary care physician: 


Dino WHITFIELD [facility] Course: 





1.  Sinus bradycardia, may be attributed to Cardizem and Toprol-XL use, dose 

decreased.  Thyroid function test checked and normal.  Patient was seen and 

evaluated by cardiology.





2.  Elevated troponin: Most likely non-thrombotic troponin leak.  Patient 

denies any chest pain.  12-lead ECG showed no acute ischemic changes. 

Echocardiogram showed preserved ejection fraction and no significant valvular 

abnormalities.  





3.  Non-complicated urinary tract infection.  Urine culture showed Klebsiella 

pneumonia susceptible to cefazolin. Would finish antibiotic course with Keflex 

for 5 days





4.  Bilateral lower extremity cellulitis/chronic venous stasis 





5.  Severe pulmonary hypertension, severe tricuspid regurgitation, moderate 

mitral valve regurgitation, noted on echocardiogram of the heart: Patient follow

-up closely with her own cardiologist in [facility].  Advised to follow-

up as an outpatient.  





6.  Morbid obesity





7.  Mixed hyperlipidemia on Lipitor





8.  Generalized anxiety disorder, maintained on Xanax 3 times a day.  I would 

discontinue nighttime Restoril





9.  Major depressive disorder: Continue home medication





10.  Physical debility, PT/OT/ consulted.  Plan to discharge to 

[facility] 





11. Type II diabetes mellitus, continue home dose of insulin. sliding scale.  

A1c 8.1





12.  Obstructive sleep apnea on CPAP at  night





Patient Condition at Discharge: Good





Plan - Discharge Summary


Discharge Rx Participant: No


New Discharge Prescriptions: 


New


   ALPRAZolam [Xanax] 0.25 mg PO TID PRN #9 tab


     PRN Reason: Anxiety


   Cephalexin [Keflex] 500 mg PO TID #15 cap


   Diltiazem Oral [Cardizem*] 30 mg PO TID #90 tab


   Metoprolol Succinate (ER) [Toprol XL] 25 mg PO DAILY #30 tab.er.24h





Continue


   Ascorbic Acid [Vitamin C] 500 mg PO DAILY


   Sertraline [Zoloft] 100 mg PO DAILY


   Pantoprazole Sodium [Protonix] 40 mg PO DAILY


   Insulin Glargine,Hum.rec.anlog [Lantus Solostar] 16 unit SQ DAILY


   Insulin Lispro [humaLOG Kwikpen] See Protocol SQ ACHS


   Furosemide [Lasix] 40 mg PO DAILY


   Ferrous Gluconate 324 mg PO MOWEFR


   Atorvastatin [Lipitor] 10 mg PO DAILY


   Aspirin EC [Ecotrin Low Dose] 81 mg PO DAILY


   Cholecalciferol (Vitamin D3) [Vitamin D3] 2,000 unit PO DAILY


   Calcium Carbonate [Tums Ultra Strength] 1,177 mg PO TID PRN


     PRN Reason: Indigestion


   Albuterol Inhaler [Ventolin Hfa Inhaler] 2 puff INHALATION RT-Q4H PRN


     PRN Reason: SOB/COPD


   Nystatin 100,000Unit/gm Cream [Mycostatin Cream] 1 applic TOPICAL BID PRN


     PRN Reason: Rash


   Magnesium Hydroxide [Milk of Magnesia] 2,400 mg PO DAILY PRN


     PRN Reason: Constipation


   Ipratropium-Albuterol Nebulize [Duoneb 0.5 mg-3 mg/3 ml Soln] 3 ml 

INHALATION RT-QID


   Acetaminophen Tab [Tylenol] 650 mg PO Q6H PRN


     PRN Reason: PAIN/FEVER


   ALPRAZolam [Xanax] 0.25 mg PO TID PRN


     PRN Reason: Anxiety


   HYDROcodone/APAP 5-325MG [Norco 5-325] 1 tab PO Q6HR PRN #12 tab


     PRN Reason: Pain





Discontinued


   Metoprolol Succinate (ER) [Toprol Xl] 50 mg PO DAILY


   Fluconazole [Diflucan] 150 mg PO TH


   Diltiazem HCl [Diltiazem 12Hr ER] 60 mg PO Q12HR


   Senexon-S 1 tab PO DAILY


   Temazepam [Restoril] 15 mg PO HS PRN


     PRN Reason: Insomnia


   Q-Tussin-Dm 10 ml PO Q4H PRN


     PRN Reason: Cough


   Bisacodyl [Dulcolax] 10 mg RECTAL DAILY PRN


     PRN Reason: Constipation


Discharge Medication List





ALPRAZolam [Xanax] 0.25 mg PO TID PRN 07/27/18 [History]


Acetaminophen Tab [Tylenol] 650 mg PO Q6H PRN 07/27/18 [History]


Albuterol Inhaler [Ventolin Hfa Inhaler] 2 puff INHALATION RT-Q4H PRN 07/27/18 [

History]


Ascorbic Acid [Vitamin C] 500 mg PO DAILY 07/27/18 [History]


Aspirin EC [Ecotrin Low Dose] 81 mg PO DAILY 07/27/18 [History]


Atorvastatin [Lipitor] 10 mg PO DAILY 07/27/18 [History]


Calcium Carbonate [Tums Ultra Strength] 1,177 mg PO TID PRN 07/27/18 [History]


Cholecalciferol (Vitamin D3) [Vitamin D3] 2,000 unit PO DAILY 07/27/18 [History]


Ferrous Gluconate 324 mg PO MOWEFR 07/27/18 [History]


Furosemide [Lasix] 40 mg PO DAILY 07/27/18 [History]


Insulin Glargine,Hum.rec.anlog [Lantus Solostar] 16 unit SQ DAILY 07/27/18 [

History]


Insulin Lispro [humaLOG Kwikpen] See Protocol SQ ACHS 07/27/18 [History]


Ipratropium-Albuterol Nebulize [Duoneb 0.5 mg-3 mg/3 ml Soln] 3 ml INHALATION RT

-QID 07/27/18 [History]


Magnesium Hydroxide [Milk of Magnesia] 2,400 mg PO DAILY PRN 07/27/18 [History]


Nystatin 100,000Unit/gm Cream [Mycostatin Cream] 1 applic TOPICAL BID PRN 07/27/ 18 [History]


Pantoprazole Sodium [Protonix] 40 mg PO DAILY 07/27/18 [History]


Sertraline [Zoloft] 100 mg PO DAILY 07/27/18 [History]


ALPRAZolam [Xanax] 0.25 mg PO TID PRN #9 tab 07/30/18 [Rx]


Cephalexin [Keflex] 500 mg PO TID #15 cap 07/30/18 [Rx]


Diltiazem Oral [Cardizem*] 30 mg PO TID #90 tab 07/30/18 [Rx]


HYDROcodone/APAP 5-325MG [Norco 5-325] 1 tab PO Q6HR PRN #12 tab 07/30/18 [Rx]


Metoprolol Succinate (ER) [Toprol XL] 25 mg PO DAILY #30 tab.er.24h 07/30/18 [Rx

]








Follow up Appointment(s)/Referral(s): 


Dino Winter MD [Primary Care Provider] - 1-2 days


Discharge Disposition: TRANSFER TO SNF/ECF

## 2018-12-31 ENCOUNTER — HOSPITAL ENCOUNTER (EMERGENCY)
Dept: HOSPITAL 47 - EC | Age: 78
Discharge: HOME | End: 2018-12-31
Payer: MEDICARE

## 2018-12-31 VITALS — TEMPERATURE: 98 F

## 2018-12-31 VITALS — RESPIRATION RATE: 18 BRPM | HEART RATE: 82 BPM | DIASTOLIC BLOOD PRESSURE: 87 MMHG | SYSTOLIC BLOOD PRESSURE: 156 MMHG

## 2018-12-31 DIAGNOSIS — E78.5: ICD-10-CM

## 2018-12-31 DIAGNOSIS — Z99.89: ICD-10-CM

## 2018-12-31 DIAGNOSIS — E11.9: ICD-10-CM

## 2018-12-31 DIAGNOSIS — Z79.4: ICD-10-CM

## 2018-12-31 DIAGNOSIS — F32.9: ICD-10-CM

## 2018-12-31 DIAGNOSIS — I48.91: ICD-10-CM

## 2018-12-31 DIAGNOSIS — K21.9: ICD-10-CM

## 2018-12-31 DIAGNOSIS — Z79.82: ICD-10-CM

## 2018-12-31 DIAGNOSIS — Z88.1: ICD-10-CM

## 2018-12-31 DIAGNOSIS — Z88.0: ICD-10-CM

## 2018-12-31 DIAGNOSIS — F41.9: ICD-10-CM

## 2018-12-31 DIAGNOSIS — Z87.891: ICD-10-CM

## 2018-12-31 DIAGNOSIS — I10: ICD-10-CM

## 2018-12-31 DIAGNOSIS — G47.30: ICD-10-CM

## 2018-12-31 DIAGNOSIS — I25.10: ICD-10-CM

## 2018-12-31 DIAGNOSIS — Z96.653: ICD-10-CM

## 2018-12-31 DIAGNOSIS — Z79.899: ICD-10-CM

## 2018-12-31 DIAGNOSIS — R04.0: Primary | ICD-10-CM

## 2018-12-31 DIAGNOSIS — M19.90: ICD-10-CM

## 2018-12-31 DIAGNOSIS — Z91.041: ICD-10-CM

## 2018-12-31 PROCEDURE — 99283 EMERGENCY DEPT VISIT LOW MDM: CPT

## 2018-12-31 PROCEDURE — 96372 THER/PROPH/DIAG INJ SC/IM: CPT

## 2018-12-31 NOTE — ED
ENT HPI





- General


Chief complaint: ENT


Stated complaint: Nose Bleed


Time Seen by Provider: 12/31/18 06:17


Source: patient


Mode of arrival: EMS


Limitations: no limitations





- History of Present Illness


Initial comments: 





Kayce is a pleasant 78-year-old female who presents to the emergency 

department today for evaluation of nosebleed.  Patient reports herbeen bleeding 

intermittently since about midnight, she has intermittently tried to hold 

pressure and his primarily just been wiping her nose waiting for the bleeding 

to stop.  Patient does report a history of seasonal ALLERGIES, she does report 

that it's very dry in her home. 


 denies any associated fevers, chills, nausea, vomiting, chest pain 

palpitation shortness of breath or lightheadedness.  She denies any history of 

significant nosebleeds or trauma in the past.





- Related Data


 Home Medications











 Medication  Instructions  Recorded  Confirmed


 


ALPRAZolam [Xanax] 0.25 mg PO TID PRN 07/27/18 07/27/18


 


Acetaminophen Tab [Tylenol] 650 mg PO Q6H PRN 07/27/18 07/27/18


 


Albuterol Inhaler [Ventolin Hfa 2 puff INHALATION RT-Q4H PRN 07/27/18 07/27/18





Inhaler]   


 


Ascorbic Acid [Vitamin C] 500 mg PO DAILY 07/27/18 07/27/18


 


Aspirin EC [Ecotrin Low Dose] 81 mg PO DAILY 07/27/18 07/27/18


 


Atorvastatin [Lipitor] 10 mg PO DAILY 07/27/18 07/27/18


 


Calcium Carbonate [Tums Ultra 1,177 mg PO TID PRN 07/27/18 07/27/18





Strength]   


 


Cholecalciferol (Vitamin D3) 2,000 unit PO DAILY 07/27/18 07/27/18





[Vitamin D3]   


 


Ferrous Gluconate 324 mg PO MOWEFR 07/27/18 07/27/18


 


Furosemide [Lasix] 40 mg PO DAILY 07/27/18 07/27/18


 


Insulin Glargine,Hum.rec.anlog 16 unit SQ DAILY 07/27/18 07/27/18





[Lantus Solostar]   


 


Insulin Lispro [humaLOG Kwikpen] See Protocol SQ ACHS 07/27/18 07/27/18


 


Ipratropium-Albuterol Nebulize 3 ml INHALATION RT-QID 07/27/18 07/27/18





[Duoneb 0.5 mg-3 mg/3 ml Soln]   


 


Magnesium Hydroxide [Milk of 2,400 mg PO DAILY PRN 07/27/18 07/27/18





Magnesia]   


 


Nystatin 100,000Unit/gm Cream 1 applic TOPICAL BID PRN 07/27/18 07/27/18





[Mycostatin Cream]   


 


Pantoprazole Sodium [Protonix] 40 mg PO DAILY 07/27/18 07/27/18


 


Sertraline [Zoloft] 100 mg PO DAILY 07/27/18 07/27/18








 Previous Rx's











 Medication  Instructions  Recorded


 


ALPRAZolam [Xanax] 0.25 mg PO TID PRN #9 tab 07/30/18


 


Cephalexin [Keflex] 500 mg PO TID #15 cap 07/30/18


 


Diltiazem Oral [Cardizem*] 30 mg PO TID #90 tab 07/30/18


 


HYDROcodone/APAP 5-325MG [Norco 1 tab PO Q6HR PRN #12 tab 07/30/18





5-325]  


 


Metoprolol Succinate (ER) [Toprol 25 mg PO DAILY #30 tab.er.24h 07/30/18





XL]  











 Allergies











Allergy/AdvReac Type Severity Reaction Status Date / Time


 


Iodinated Contrast- Oral and Allergy  Unknown Verified 12/31/18 06:17





IV Dye     


 


Penicillins Allergy  Unknown Verified 12/31/18 06:17


 


vancomycin Allergy  Unknown Verified 12/31/18 06:17














Review of Systems


ROS Statement: 


Those systems with pertinent positive or pertinent negative responses have been 

documented in the HPI.





ROS Other: All systems not noted in ROS Statement are negative.





Past Medical History


Past Medical History: Atrial Fibrillation, Coronary Artery Disease (CAD), 

Diabetes Mellitus, GERD/Reflux, Hyperlipidemia, Hypertension, Osteoarthritis (OA

), Pneumonia, Sleep Apnea/CPAP/BIPAP


Additional Past Medical History / Comment(s): home 02 3 liters atc,lt lower leg 

wound


History of Any Multi-Drug Resistant Organisms: None Reported


Past Surgical History: Orthopedic Surgery, Tonsillectomy


Additional Past Surgical History / Comment(s): dontae cataracts removed has lens 

implants, dontae knee replacments


Past Anesthesia/Blood Transfusion Reactions: Motion Sickness


Additional Past Anesthesia/Blood Transfusion Reaction / Comment(s): 

clausterphobia


Past Psychological History: Anxiety, Depression


Smoking Status: Former smoker


Past Alcohol Use History: None Reported


Past Drug Use History: None Reported





- Past Family History


  ** Father


Family Medical History: Coronary Artery Disease (CAD)


Additional Family Medical History / Comment(s): enlarged heart





  ** Mother


Family Medical History: Dementia, Hypertension


Additional Family Medical History / Comment(s): hysterectomy-d/t benign tumor





General Exam





- General Exam Comments


Initial Comments: 


Physical Exam


GENERAL:


Patient is well-developed and well-nourished.  


Patient is nontoxic and well-hydrated and is in no distress.





HENT:


Normocephalic, Atraumatic. 


Dried blood in the right naris, after patient blew her nose and remove the clot 

there was a small area of bleeding on the septum of the right naris, no 

bleeding from the left





EYES:


PERRL, EOMI


No conjunctival pallor





PULMONARY:


Unlabored respirations.  No audible rales rhonchi or wheezing was noted.





CARDIOVASCULAR:


Irregularly irregular 





ABDOMEN:


Soft and nontender with normal bowel sounds. 





SKIN:


Skin is clear with no lesions or rashes and otherwise unremarkable.


No pallor





: 


Deferred





NEUROLOGIC:


Patient is alert and oriented x3.  Moving all extremities spontaneously





MUSCULOSKELETAL:


Normal extremities with adequate strength and full range of motion.  No lower 

extremity swelling or edema.  No calf tenderness.  





PSYCHIATRIC:


Normal psychiatric evaluation.  





Limitations: no limitations





Limitations: no limitations





Course


 Vital Signs











  12/31/18 12/31/18 12/31/18





  06:12 06:25 07:57


 


Temperature 98.0 F  


 


Pulse Rate 88  82


 


Respiratory 18 19 18





Rate   


 


Blood Pressure 170/63  156/87


 


O2 Sat by Pulse 97  97





Oximetry   














Medical Decision Making





- Medical Decision Making


Patient was seen and evaluated history is obtained from the patient


Patient blew her nose and removed a very large clot, she suddenly had some 

venous oozing from the anterior nose.  A clamp was placed.


A nasal clamp was placed





Gauze soaked in Afrin was packed into the nose 


Bleeding resolved completely





She was provided nasal clamps to take home.  Patient was advised that if she 

has any further bleeding she should apply the nasal clamp for 15 minutes 

without removing it if bleeding persists after 15 minutes she should return to 

the emergency department for reevaluation.  All questions pertaining care were 

answered return parameters were discussed patient was discharged home in stable 

condition.








Disposition


Clinical Impression: 


 Epistaxis





Disposition: HOME SELF-CARE


Instructions:  Nosebleed (ED)


Is patient prescribed a controlled substance at d/c from ED?: No


Referrals: 


Amado Augustin MD [Primary Care Provider] - 1-2 days

## 2019-01-04 ENCOUNTER — HOSPITAL ENCOUNTER (INPATIENT)
Dept: HOSPITAL 47 - EC | Age: 79
LOS: 7 days | Discharge: NURSING, REHAB OR LONG-TERM CARE | DRG: 871 | End: 2019-01-11
Payer: MEDICARE

## 2019-01-04 VITALS — BODY MASS INDEX: 36.4 KG/M2

## 2019-01-04 DIAGNOSIS — N39.0: ICD-10-CM

## 2019-01-04 DIAGNOSIS — E78.5: ICD-10-CM

## 2019-01-04 DIAGNOSIS — R32: ICD-10-CM

## 2019-01-04 DIAGNOSIS — K76.1: ICD-10-CM

## 2019-01-04 DIAGNOSIS — Z99.89: ICD-10-CM

## 2019-01-04 DIAGNOSIS — E11.9: ICD-10-CM

## 2019-01-04 DIAGNOSIS — I27.21: ICD-10-CM

## 2019-01-04 DIAGNOSIS — J96.01: ICD-10-CM

## 2019-01-04 DIAGNOSIS — Z79.4: ICD-10-CM

## 2019-01-04 DIAGNOSIS — Z79.899: ICD-10-CM

## 2019-01-04 DIAGNOSIS — Z84.2: ICD-10-CM

## 2019-01-04 DIAGNOSIS — Z81.8: ICD-10-CM

## 2019-01-04 DIAGNOSIS — A41.9: Primary | ICD-10-CM

## 2019-01-04 DIAGNOSIS — B95.2: ICD-10-CM

## 2019-01-04 DIAGNOSIS — T50.1X5A: ICD-10-CM

## 2019-01-04 DIAGNOSIS — I08.0: ICD-10-CM

## 2019-01-04 DIAGNOSIS — Z96.653: ICD-10-CM

## 2019-01-04 DIAGNOSIS — Z98.42: ICD-10-CM

## 2019-01-04 DIAGNOSIS — R19.7: ICD-10-CM

## 2019-01-04 DIAGNOSIS — H91.90: ICD-10-CM

## 2019-01-04 DIAGNOSIS — M19.90: ICD-10-CM

## 2019-01-04 DIAGNOSIS — Z96.1: ICD-10-CM

## 2019-01-04 DIAGNOSIS — I50.33: ICD-10-CM

## 2019-01-04 DIAGNOSIS — J15.6: ICD-10-CM

## 2019-01-04 DIAGNOSIS — Z98.41: ICD-10-CM

## 2019-01-04 DIAGNOSIS — Z82.49: ICD-10-CM

## 2019-01-04 DIAGNOSIS — Z99.81: ICD-10-CM

## 2019-01-04 DIAGNOSIS — R62.7: ICD-10-CM

## 2019-01-04 DIAGNOSIS — F41.9: ICD-10-CM

## 2019-01-04 DIAGNOSIS — F32.9: ICD-10-CM

## 2019-01-04 DIAGNOSIS — Z87.891: ICD-10-CM

## 2019-01-04 DIAGNOSIS — Z79.82: ICD-10-CM

## 2019-01-04 DIAGNOSIS — Z87.01: ICD-10-CM

## 2019-01-04 DIAGNOSIS — E66.01: ICD-10-CM

## 2019-01-04 DIAGNOSIS — J69.0: ICD-10-CM

## 2019-01-04 DIAGNOSIS — Z71.3: ICD-10-CM

## 2019-01-04 DIAGNOSIS — K21.9: ICD-10-CM

## 2019-01-04 DIAGNOSIS — G47.33: ICD-10-CM

## 2019-01-04 DIAGNOSIS — Z91.041: ICD-10-CM

## 2019-01-04 DIAGNOSIS — Z88.0: ICD-10-CM

## 2019-01-04 DIAGNOSIS — I11.0: ICD-10-CM

## 2019-01-04 DIAGNOSIS — Z88.1: ICD-10-CM

## 2019-01-04 DIAGNOSIS — I25.10: ICD-10-CM

## 2019-01-04 DIAGNOSIS — J44.1: ICD-10-CM

## 2019-01-04 DIAGNOSIS — I48.0: ICD-10-CM

## 2019-01-04 DIAGNOSIS — J44.0: ICD-10-CM

## 2019-01-04 LAB
ALBUMIN SERPL-MCNC: 3.5 G/DL (ref 3.5–5)
ALP SERPL-CCNC: 132 U/L (ref 38–126)
ALT SERPL-CCNC: 47 U/L (ref 9–52)
ANION GAP SERPL CALC-SCNC: 12 MMOL/L
APTT BLD: 23.7 SEC (ref 22–30)
AST SERPL-CCNC: 45 U/L (ref 14–36)
BASOPHILS # BLD AUTO: 0.1 K/UL (ref 0–0.2)
BASOPHILS NFR BLD AUTO: 1 %
BUN SERPL-SCNC: 38 MG/DL (ref 7–17)
CALCIUM SPEC-MCNC: 9.7 MG/DL (ref 8.4–10.2)
CHLORIDE SERPL-SCNC: 100 MMOL/L (ref 98–107)
CK SERPL-CCNC: 120 U/L (ref 30–135)
CO2 SERPL-SCNC: 28 MMOL/L (ref 22–30)
EOSINOPHIL # BLD AUTO: 0.1 K/UL (ref 0–0.7)
EOSINOPHIL NFR BLD AUTO: 1 %
ERYTHROCYTE [DISTWIDTH] IN BLOOD BY AUTOMATED COUNT: 4.21 M/UL (ref 3.8–5.4)
ERYTHROCYTE [DISTWIDTH] IN BLOOD: 15.1 % (ref 11.5–15.5)
GLUCOSE BLD-MCNC: 59 MG/DL (ref 75–99)
GLUCOSE BLD-MCNC: 88 MG/DL (ref 75–99)
GLUCOSE SERPL-MCNC: 70 MG/DL (ref 74–99)
HCT VFR BLD AUTO: 38.4 % (ref 34–46)
HGB BLD-MCNC: 12.2 GM/DL (ref 11.4–16)
INR PPP: 1 (ref ?–1.2)
LYMPHOCYTES # SPEC AUTO: 0.8 K/UL (ref 1–4.8)
LYMPHOCYTES NFR SPEC AUTO: 6 %
MAGNESIUM SPEC-SCNC: 1.6 MG/DL (ref 1.6–2.3)
MCH RBC QN AUTO: 29 PG (ref 25–35)
MCHC RBC AUTO-ENTMCNC: 31.8 G/DL (ref 31–37)
MCV RBC AUTO: 91.2 FL (ref 80–100)
MONOCYTES # BLD AUTO: 0.5 K/UL (ref 0–1)
MONOCYTES NFR BLD AUTO: 4 %
NEUTROPHILS # BLD AUTO: 11.2 K/UL (ref 1.3–7.7)
NEUTROPHILS NFR BLD AUTO: 87 %
PLATELET # BLD AUTO: 335 K/UL (ref 150–450)
POTASSIUM SERPL-SCNC: 3.7 MMOL/L (ref 3.5–5.1)
PROT SERPL-MCNC: 6.8 G/DL (ref 6.3–8.2)
PT BLD: 10.9 SEC (ref 9–12)
SODIUM SERPL-SCNC: 140 MMOL/L (ref 137–145)
TROPONIN I SERPL-MCNC: 0.07 NG/ML (ref 0–0.03)
WBC # BLD AUTO: 12.8 K/UL (ref 3.8–10.6)

## 2019-01-04 PROCEDURE — 87040 BLOOD CULTURE FOR BACTERIA: CPT

## 2019-01-04 PROCEDURE — 83735 ASSAY OF MAGNESIUM: CPT

## 2019-01-04 PROCEDURE — 87324 CLOSTRIDIUM AG IA: CPT

## 2019-01-04 PROCEDURE — 94640 AIRWAY INHALATION TREATMENT: CPT

## 2019-01-04 PROCEDURE — 99291 CRITICAL CARE FIRST HOUR: CPT

## 2019-01-04 PROCEDURE — 87070 CULTURE OTHR SPECIMN AEROBIC: CPT

## 2019-01-04 PROCEDURE — 85730 THROMBOPLASTIN TIME PARTIAL: CPT

## 2019-01-04 PROCEDURE — 71046 X-RAY EXAM CHEST 2 VIEWS: CPT

## 2019-01-04 PROCEDURE — 82550 ASSAY OF CK (CPK): CPT

## 2019-01-04 PROCEDURE — 85025 COMPLETE CBC W/AUTO DIFF WBC: CPT

## 2019-01-04 PROCEDURE — 93306 TTE W/DOPPLER COMPLETE: CPT

## 2019-01-04 PROCEDURE — 83605 ASSAY OF LACTIC ACID: CPT

## 2019-01-04 PROCEDURE — 87205 SMEAR GRAM STAIN: CPT

## 2019-01-04 PROCEDURE — 87186 SC STD MICRODIL/AGAR DIL: CPT

## 2019-01-04 PROCEDURE — 80048 BASIC METABOLIC PNL TOTAL CA: CPT

## 2019-01-04 PROCEDURE — 82553 CREATINE MB FRACTION: CPT

## 2019-01-04 PROCEDURE — 96374 THER/PROPH/DIAG INJ IV PUSH: CPT

## 2019-01-04 PROCEDURE — 85610 PROTHROMBIN TIME: CPT

## 2019-01-04 PROCEDURE — 83036 HEMOGLOBIN GLYCOSYLATED A1C: CPT

## 2019-01-04 PROCEDURE — 87502 INFLUENZA DNA AMP PROBE: CPT

## 2019-01-04 PROCEDURE — 80053 COMPREHEN METABOLIC PANEL: CPT

## 2019-01-04 PROCEDURE — 81001 URINALYSIS AUTO W/SCOPE: CPT

## 2019-01-04 PROCEDURE — 93005 ELECTROCARDIOGRAM TRACING: CPT

## 2019-01-04 PROCEDURE — 84484 ASSAY OF TROPONIN QUANT: CPT

## 2019-01-04 PROCEDURE — 83880 ASSAY OF NATRIURETIC PEPTIDE: CPT

## 2019-01-04 PROCEDURE — 87086 URINE CULTURE/COLONY COUNT: CPT

## 2019-01-04 PROCEDURE — 36415 COLL VENOUS BLD VENIPUNCTURE: CPT

## 2019-01-04 PROCEDURE — 94760 N-INVAS EAR/PLS OXIMETRY 1: CPT

## 2019-01-04 PROCEDURE — 87077 CULTURE AEROBIC IDENTIFY: CPT

## 2019-01-04 RX ADMIN — IPRATROPIUM BROMIDE AND ALBUTEROL SULFATE SCH ML: .5; 3 SOLUTION RESPIRATORY (INHALATION) at 20:17

## 2019-01-04 RX ADMIN — INSULIN ASPART SCH: 100 INJECTION, SOLUTION INTRAVENOUS; SUBCUTANEOUS at 20:35

## 2019-01-04 RX ADMIN — DILTIAZEM HYDROCHLORIDE SCH MG: 30 TABLET, FILM COATED ORAL at 20:44

## 2019-01-04 RX ADMIN — FUROSEMIDE SCH: 10 INJECTION, SOLUTION INTRAMUSCULAR; INTRAVENOUS at 22:19

## 2019-01-04 RX ADMIN — Medication SCH MG: at 23:12

## 2019-01-04 RX ADMIN — IPRATROPIUM BROMIDE AND ALBUTEROL SULFATE SCH ML: .5; 3 SOLUTION RESPIRATORY (INHALATION) at 23:21

## 2019-01-04 RX ADMIN — HYDROXYCHLOROQUINE SULFATE SCH MG: 200 TABLET, FILM COATED ORAL at 20:44

## 2019-01-04 RX ADMIN — CEFAZOLIN SCH MLS/HR: 330 INJECTION, POWDER, FOR SOLUTION INTRAMUSCULAR; INTRAVENOUS at 20:44

## 2019-01-04 NOTE — XR
EXAMINATION TYPE: XR chest 2V

 

DATE OF EXAM: 1/4/2019

 

COMPARISON: NONE

 

HISTORY: Shortness of breath

 

TECHNIQUE:  Frontal and lateral views of the chest are obtained.

 

FINDINGS:

 

Scattered senescent parenchymal changes noted. Hyperinflation compatible with COPD. 

 

Patchy right perihilar infiltrate noted.

 

Correlate for pneumonia. Cardiomegaly seen.

 

 

 

Mediastinal structures are stable and grossly unremarkable.

 

No evidence for hilar prominence.

 

Degenerative changes dorsal spine. 

 

IMPRESSION:

Patchy right perihilar infiltrate noted.

 

Correlate for pneumonia. Cardiomegaly seen.

## 2019-01-04 NOTE — ED
SOB HPI





- General


Chief Complaint: Shortness of Breath


Stated Complaint: Pneumonia


Time Seen by Provider: 01/04/19 15:12


Source: patient, EMS, RN notes reviewed


Mode of arrival: EMS


Limitations: no limitations





- History of Present Illness


Initial Comments: 





This is a 78-year-old female with a history of recent nosebleed the right naris 

who had a balloon placed 4 days ago who presents now with complaints of 

shortness of breath cough fevers chills and sweats she apparently had an 

outpatient x-ray done which did show pneumonia.  She denies any chest pain 

peripheral edema or other symptoms at this time.


MD Complaint: shortness of breath, cough





- Related Data


 Home Medications











 Medication  Instructions  Recorded  Confirmed


 


Acetaminophen Tab [Tylenol] 650 mg PO Q6H PRN 07/27/18 01/04/19


 


Albuterol Inhaler [Ventolin Hfa 2 puff INHALATION RT-Q4H PRN 07/27/18 01/04/19





Inhaler]   


 


Ascorbic Acid [Vitamin C] 500 mg PO DAILY 07/27/18 01/04/19


 


Aspirin EC [Ecotrin Low Dose] 81 mg PO DAILY 07/27/18 01/04/19


 


Atorvastatin [Lipitor] 10 mg PO DAILY 07/27/18 01/04/19


 


Cholecalciferol (Vitamin D3) 2,000 unit PO DAILY 07/27/18 01/04/19





[Vitamin D3]   


 


Ferrous Gluconate 324 mg PO MOWEFR 07/27/18 01/04/19


 


Furosemide [Lasix] 40 mg PO BID 07/27/18 01/04/19


 


Insulin Glargine,Hum.rec.anlog 25 unit SQ DAILY 07/27/18 01/04/19





[Lantus Solostar]   


 


Insulin Lispro [humaLOG Kwikpen] See Protocol SQ BID 07/27/18 01/04/19


 


Ipratropium-Albuterol Nebulize 3 ml INHALATION RT-QID 07/27/18 01/04/19





[Duoneb 0.5 mg-3 mg/3 ml Soln]   


 


Nystatin 100,000Unit/gm Cream 1 applic TOPICAL BID PRN 07/27/18 01/04/19





[Mycostatin Cream]   


 


Sertraline [Zoloft] 100 mg PO DAILY 07/27/18 01/04/19


 


Allopurinol [Zyloprim] 300 mg PO DAILY 01/04/19 01/04/19


 


Bisacodyl [Dulcolax] 10 mg RECTAL ONCE PRN 01/04/19 01/04/19


 


Calcium Carbonate [Tums] 500 mg PO TID PRN 01/04/19 01/04/19


 


Hydroxychloroquine Sulfate 200 mg PO BID 01/04/19 01/04/19





[Plaquenil]   


 


Magnesium Oxide [Mag-Ox] 400 mg PO DAILY 01/04/19 01/04/19


 


Melatonin 3 mg PO HS 01/04/19 01/04/19


 


Omeprazole 20 mg PO DAILY 01/04/19 01/04/19


 


Sennosides/Docusate Sodium 2 tab PO TUTHSA PRN 01/04/19 01/04/19





[Senna-S Laxative Tablet]   


 


Sodium Chloride [Ocean] 2 spray EA NOSTRIL TID 01/04/19 01/04/19








 Previous Rx's











 Medication  Instructions  Recorded


 


Diltiazem Oral [Cardizem*] 30 mg PO TID #90 tab 07/30/18


 


HYDROcodone/APAP 5-325MG [Norco 1 tab PO Q6HR PRN #12 tab 07/30/18





5-325]  


 


Metoprolol Succinate (ER) [Toprol 25 mg PO DAILY #30 tab.er.24h 07/30/18





XL]  











 Allergies











Allergy/AdvReac Type Severity Reaction Status Date / Time


 


Iodinated Contrast- Oral and Allergy  Unknown Verified 01/04/19 16:01





IV Dye     


 


Penicillins Allergy  Unknown Verified 01/04/19 16:01


 


vancomycin Allergy  Unknown Verified 01/04/19 16:01














Review of Systems


ROS Statement: 


Those systems with pertinent positive or pertinent negative responses have been 

documented in the HPI.





ROS Other: All systems not noted in ROS Statement are negative.





Past Medical History


Past Medical History: Atrial Fibrillation, Coronary Artery Disease (CAD), 

Diabetes Mellitus, GERD/Reflux, Hyperlipidemia, Hypertension, Osteoarthritis (OA

), Pneumonia, Sleep Apnea/CPAP/BIPAP


Additional Past Medical History / Comment(s): home 02 3 liters atc,lt lower leg 

wound


History of Any Multi-Drug Resistant Organisms: None Reported


Past Surgical History: Orthopedic Surgery, Tonsillectomy


Additional Past Surgical History / Comment(s): dontae cataracts removed has lens 

implants, dontae knee replacments


Past Anesthesia/Blood Transfusion Reactions: Motion Sickness


Additional Past Anesthesia/Blood Transfusion Reaction / Comment(s): 

clausterphobia


Past Psychological History: Anxiety, Depression


Smoking Status: Former smoker


Past Alcohol Use History: None Reported


Past Drug Use History: None Reported





- Past Family History


  ** Father


Family Medical History: Coronary Artery Disease (CAD)


Additional Family Medical History / Comment(s): enlarged heart





  ** Mother


Family Medical History: Dementia, Hypertension


Additional Family Medical History / Comment(s): hysterectomy-d/t benign tumor





General Exam





- General Exam Comments


Initial Comments: 





This is a well-developed well-nourished awake alert oriented 3 female


Limitations: no limitations


General appearance: alert, lethargic


Head exam: Present: atraumatic, normocephalic, normal inspection


Eye exam: Present: normal appearance, PERRL, EOMI.  Absent: scleral icterus, 

conjunctival injection, periorbital swelling


ENT exam: Present: mucous membranes dry


Neck exam: Present: normal inspection, full ROM, other (No stridor JVD or bruits

).  Absent: tenderness, meningismus, lymphadenopathy


Respiratory exam: Present: rhonchi, accessory muscle use, decreased breath 

sounds.  Absent: respiratory distress, wheezes, rales, stridor


Cardiovascular Exam: Present: regular rate, normal rhythm, normal heart sounds.

  Absent: systolic murmur, diastolic murmur, rubs, gallop, clicks


GI/Abdominal exam: Present: soft, normal bowel sounds.  Absent: distended, 

tenderness, guarding, rebound, rigid


Extremities exam: Present: normal inspection, full ROM, normal capillary 

refill.  Absent: tenderness, pedal edema, joint swelling, calf tenderness


Back exam: Present: normal inspection


Neurological exam: Present: alert, oriented X3, CN II-XII intact


Psychiatric exam: Present: normal affect, normal mood


Skin exam: Present: warm, dry, intact, normal color.  Absent: rash





Course


 Vital Signs











  01/04/19 01/04/19 01/04/19





  14:56 15:00 15:05


 


Temperature   98.0 F


 


Pulse Rate  79 73


 


Respiratory  16 25 H





Rate   


 


Blood Pressure  116/66 124/75


 


O2 Sat by Pulse 93 L 93 L 81 L





Oximetry   














  01/04/19 01/04/19 01/04/19





  15:06 15:08 15:36


 


Temperature   


 


Pulse Rate   73


 


Respiratory 25 H  





Rate   


 


Blood Pressure   


 


O2 Sat by Pulse  93 L 





Oximetry   














  01/04/19 01/04/19 01/04/19





  15:48 16:00 17:00


 


Temperature   


 


Pulse Rate 81 70 74


 


Respiratory  22 17





Rate   


 


Blood Pressure  113/69 104/49


 


O2 Sat by Pulse  93 L 92 L





Oximetry   














- Reevaluation(s)


Reevaluation #1: 





01/04/19 17:58


Did reevaluate patient several occasions she does have evidence of pneumonia as 

well as CHF.  I did discuss the findings with her she will be admitted.





Medical Decision Making





- Lab Data


Result diagrams: 


 01/04/19 15:00





 01/04/19 15:00


 Lab Results











  01/04/19 01/04/19 01/04/19 Range/Units





  15:00 15:00 15:00 


 


WBC   12.8 H   (3.8-10.6)  k/uL


 


RBC   4.21   (3.80-5.40)  m/uL


 


Hgb   12.2   (11.4-16.0)  gm/dL


 


Hct   38.4   (34.0-46.0)  %


 


MCV   91.2   (80.0-100.0)  fL


 


MCH   29.0   (25.0-35.0)  pg


 


MCHC   31.8   (31.0-37.0)  g/dL


 


RDW   15.1   (11.5-15.5)  %


 


Plt Count   335   (150-450)  k/uL


 


Neutrophils %   87   %


 


Lymphocytes %   6   %


 


Monocytes %   4   %


 


Eosinophils %   1   %


 


Basophils %   1   %


 


Neutrophils #   11.2 H   (1.3-7.7)  k/uL


 


Lymphocytes #   0.8 L   (1.0-4.8)  k/uL


 


Monocytes #   0.5   (0-1.0)  k/uL


 


Eosinophils #   0.1   (0-0.7)  k/uL


 


Basophils #   0.1   (0-0.2)  k/uL


 


PT     (9.0-12.0)  sec


 


INR     (<1.2)  


 


APTT     (22.0-30.0)  sec


 


Sodium    140  (137-145)  mmol/L


 


Potassium    3.7  (3.5-5.1)  mmol/L


 


Chloride    100  ()  mmol/L


 


Carbon Dioxide    28  (22-30)  mmol/L


 


Anion Gap    12  mmol/L


 


BUN    38 H  (7-17)  mg/dL


 


Creatinine    0.72  (0.52-1.04)  mg/dL


 


Est GFR (CKD-EPI)AfAm    >90  (>60 ml/min/1.73 sqM)  


 


Est GFR (CKD-EPI)NonAf    81  (>60 ml/min/1.73 sqM)  


 


Glucose    70 L  (74-99)  mg/dL


 


Plasma Lactic Acid Julio     (0.7-2.0)  mmol/L


 


Calcium    9.7  (8.4-10.2)  mg/dL


 


Magnesium    1.6  (1.6-2.3)  mg/dL


 


Total Bilirubin    1.4 H  (0.2-1.3)  mg/dL


 


AST    45 H  (14-36)  U/L


 


ALT    47  (9-52)  U/L


 


Alkaline Phosphatase    132 H  ()  U/L


 


Total Creatine Kinase  120    ()  U/L


 


CK-MB (CK-2)  6.3 H    (0.0-2.4)  ng/mL


 


CK-MB (CK-2) Rel Index  5.3    


 


Troponin I  0.067 H*    (0.000-0.034)  ng/mL


 


NT-Pro-B Natriuret Pep     pg/mL


 


Total Protein    6.8  (6.3-8.2)  g/dL


 


Albumin    3.5  (3.5-5.0)  g/dL














  01/04/19 01/04/19 01/04/19 Range/Units





  15:00 15:00 15:00 


 


WBC     (3.8-10.6)  k/uL


 


RBC     (3.80-5.40)  m/uL


 


Hgb     (11.4-16.0)  gm/dL


 


Hct     (34.0-46.0)  %


 


MCV     (80.0-100.0)  fL


 


MCH     (25.0-35.0)  pg


 


MCHC     (31.0-37.0)  g/dL


 


RDW     (11.5-15.5)  %


 


Plt Count     (150-450)  k/uL


 


Neutrophils %     %


 


Lymphocytes %     %


 


Monocytes %     %


 


Eosinophils %     %


 


Basophils %     %


 


Neutrophils #     (1.3-7.7)  k/uL


 


Lymphocytes #     (1.0-4.8)  k/uL


 


Monocytes #     (0-1.0)  k/uL


 


Eosinophils #     (0-0.7)  k/uL


 


Basophils #     (0-0.2)  k/uL


 


PT    10.9  (9.0-12.0)  sec


 


INR    1.0  (<1.2)  


 


APTT    23.7  (22.0-30.0)  sec


 


Sodium     (137-145)  mmol/L


 


Potassium     (3.5-5.1)  mmol/L


 


Chloride     ()  mmol/L


 


Carbon Dioxide     (22-30)  mmol/L


 


Anion Gap     mmol/L


 


BUN     (7-17)  mg/dL


 


Creatinine     (0.52-1.04)  mg/dL


 


Est GFR (CKD-EPI)AfAm     (>60 ml/min/1.73 sqM)  


 


Est GFR (CKD-EPI)NonAf     (>60 ml/min/1.73 sqM)  


 


Glucose     (74-99)  mg/dL


 


Plasma Lactic Acid Julio  1.7    (0.7-2.0)  mmol/L


 


Calcium     (8.4-10.2)  mg/dL


 


Magnesium     (1.6-2.3)  mg/dL


 


Total Bilirubin     (0.2-1.3)  mg/dL


 


AST     (14-36)  U/L


 


ALT     (9-52)  U/L


 


Alkaline Phosphatase     ()  U/L


 


Total Creatine Kinase     ()  U/L


 


CK-MB (CK-2)     (0.0-2.4)  ng/mL


 


CK-MB (CK-2) Rel Index     


 


Troponin I     (0.000-0.034)  ng/mL


 


NT-Pro-B Natriuret Pep   47582   pg/mL


 


Total Protein     (6.3-8.2)  g/dL


 


Albumin     (3.5-5.0)  g/dL














- EKG Data


-: EKG Interpreted by Me


EKG shows normal: sinus rhythm (Sinus rhythm of 82.  Interval 172 QRS duration 

136 QT since /5:30 that exodeviation nonspecific interventricular block 

PVCs noted)





- Radiology Data


Radiology results: report reviewed (I did review the imaging and report is 

evidence a right perihilar infiltrate.), image reviewed





Critical Care Time


Critical Care Time: Yes


Critical Care Time: 





37 minutes of critical care time which includes initial presentation with 

history physical labs x-rays several reevaluation the patient response to 

therapy discuss with the patient regarding the findings discussion with the 

admitting physician Dr. nicole administration and evaluation medication by 

nursing with evaluation.  Old chart review that was available.  Admission 

orders and documentation of the above





Disposition


Clinical Impression: 


 Congestive heart failure, Pneumonia, Diabetes, History of atrial fibrillation, 

Failure to thrive





Disposition: ADMITTED AS IP TO THIS HOSP


Condition: Serious


Referrals: 


Amado Augustin MD [Primary Care Provider] - 1-2 days

## 2019-01-04 NOTE — HP
HISTORY AND PHYSICAL



DATE OF SERVICE:

01/04/2019



CHIEF COMPLAINT:

Shortness of breath.



HISTORY OF PRESENT ILLNESS:

This 78-year-old woman with a past medical history of multiple medical problems

including history of atrial fibrillation, CAD, diabetes, history of GERD,

hyperlipidemia, hypertension, history of sleep apnea, CPAP, history of home O2, 
being

followed by Dr. Augustin in the outpatient setting, not feeling well over the past

several days.  Patient presents with shortness of breath increasing with cough 
and the

patient also had some recent nosebleed from the right nose.  The balloon was 
placed

about 7 days ago and the patient having cough and sputum and the patient came 
to [facility] and was admitted for further evaluation.  White count is 
elevated. Chest

x-ray showed bilateral lesions.  There is no history of fever, rigors or 
chills. No

history of headache, loss of conscious, seizures. Troponin 0.067.



PAST MEDICAL HISTORY:

History of atrial fibrillation, CAD, diabetes type 2, history of hypertension,

hyperlipidemia, history of DJD, history of pneumonia, sleep apnea,  anxiety and

depression.



MEDICATIONS:

Prior to admission include home medications are:

1. Lantus SoloSTAR 25 units subcu daily.

2. Norco 5 mg q.6h p.r.n.

3. Ventolin HFA 2 puffs q.4h p.r.n.

4. Tylenol 650 q.6h p.r.n.

5. Tums 500 mg t.i.d. p.r.n.

6. Dulcolax 10 mg rectally p.r.n.

7. Vitamin D3 2000 daily.

9. Zoloft 100 mg p.o. daily.

10.Sennas-S 2 tablets p.o. q.6 hours add p.r.n.

11.Vitamin C 500 mg p.o. daily.

12.Omeprazole 20 mg p.o. daily.

13.Mycostatin 1 application b.i.d. p.r.n.

14.Toprol  mg p.o. daily.

15.Melatonin 3 mg p.o. q.h.s.

16.Magnesium oxide 400 mg p.o. daily.

17.DuoNeb q.i.d.

18.Plaquenil 200 mg p.o. b.i.d.

19.Lispro b.i.d.

20.Lasix 40 mg p.o. b.i.d.

21.Iron gluconate 320 mg Monday, Wednesday, Friday.

22.Cardizem 30 mg p.o. daily.

23.Lipitor 10 mg daily.

24.Ecotrin 81 mg.

25.Zyloprim 300 mg p.o. daily.



ALLERGIES:

IODINATED CONTRAST DYE, PENICILLIN,  VANCOMYCIN.



FAMILY HISTORY:

History of CAD, enlarged heart.



SOCIAL HISTORY:

Remote history of smoking.  No history of alcohol intake.



REVIEW OF SYSTEMS:

ENT:  Diminished vision, diminished hearing.

CARDIOVASCULAR: As mentioned earlier.

RESPIRATORY: As mentioned earlier.

GI no nausea or vomiting.

: No dysuria.

NERVOUS SYSTEM: No numbness or weakness.

ALLERGY/IMMUNOLOGY:  No asthma or hayfever.

MUSCULOSKELETAL: As mentioned earlier.

HEMATOLOGY/ONCOLOGY: No history of anemia.

ENDOCRINE:  Diabetes.

CONSTITUTIONAL: As mentioned earlier.

Dermatology: Negative.  Rheumatology: Negative. Psychiatry: As mentioned 
earlier.



PHYSICAL EXAM:

The patient is alert and oriented times three. Pulse is 82, blood pressure 170/
56,

respiration 18, temperature normal, pulse ox 94% on 4 L.  Temperature is 98.4.  
HEENT:

Conjunctivae normal. Oral mucosa moist.

NECK is no jugular venous distention.  No carotid bruit. No lymph node 
enlargement.

CARDIOVASCULAR SYSTEM: S1, S2.

RESPIRATORY: Breath sounds diminished in the bases. Bilateral scattered rhonchi 
and

crackles.

ABDOMEN:  Soft, nontender.  No mass palpable.  Legs no edema.  No swelling.

NERVOUS SYSTEM: Higher functions as mentioned earlier. Moves all four 
extremities.

No focal deficits.

Lymphatics: No lymph nodes palpable in the neck, axillae or groin.

SKIN: No ulcer.  No rash. No bleeding.



LAB STUDIES:

WBC 12.8, hemoglobin 12.2, otherwise glucose 70 and 59.  The total bilirubin is 
1.4,

alkaline phosphatase 132.  Troponin 0.06.



ASSESSMENT:

1. Chronic obstructive pulmonary disease acuter exacerbation, acute bilateral

    pneumonia possibly gram-negative, possibly aspiration.

2. Rule out congestive heart failure acute exacerbation with acute on chronic

    diastolic dysfunction ejection fraction 55-60 percent in the recent 2D echo.

3. Moderate mitral regurgitation and mild aortic stenosis.

4. Increased bilirubin.

5. Increased AST.

6. Troponin 0.062 indeterminate.

7. History of atrial fibrillation.

8. History of coronary artery disease.

9. Diabetes mellitus type 2.

10.Gastroesophageal reflux disease.

11.Hypertension.

12.Hyperlipidemia.

13.History of degenerative joint disease.

14.History of pneumonia.

15.History of recent epistaxis.

16.History of tonsillectomy.

17.History of anxiety, depression.



RECOMMENDATIONS AND DISCUSSION:

In this 78-year-old woman who presented with multiple complex medical issues, 
we will

monitor the patient closely, continue the current medications, management and 
we will

initiate broad-spectrum IV antibiotics.  Obtain cultures.  Pulmonary 
consultation with

Dr. Arias.  I would also consult Cardiology and continue the Lasix empirically 
also.

Overall prognosis guarded because of multiple complex medical issues.  Further

recommendations to follow.  BNP is elevated.  The patient lives in [facility].  Medication reconciliation done.  I would also recommend Accu-Cheks 
a.c. and

at bedtime, scale also.  Prognosis guarded because of multiple complex medical 
issues.

Further recommendations to follow.





MMODL / IJN: [id number] / Job#: [id number]

MTDD

## 2019-01-05 LAB
ANION GAP SERPL CALC-SCNC: 9 MMOL/L
BASOPHILS # BLD AUTO: 0.1 K/UL (ref 0–0.2)
BASOPHILS NFR BLD AUTO: 1 %
BUN SERPL-SCNC: 39 MG/DL (ref 7–17)
CALCIUM SPEC-MCNC: 9.2 MG/DL (ref 8.4–10.2)
CHLORIDE SERPL-SCNC: 103 MMOL/L (ref 98–107)
CO2 SERPL-SCNC: 32 MMOL/L (ref 22–30)
EOSINOPHIL # BLD AUTO: 0.1 K/UL (ref 0–0.7)
EOSINOPHIL NFR BLD AUTO: 1 %
ERYTHROCYTE [DISTWIDTH] IN BLOOD BY AUTOMATED COUNT: 3.83 M/UL (ref 3.8–5.4)
ERYTHROCYTE [DISTWIDTH] IN BLOOD: 14.9 % (ref 11.5–15.5)
GLUCOSE BLD-MCNC: 140 MG/DL (ref 75–99)
GLUCOSE BLD-MCNC: 150 MG/DL (ref 75–99)
GLUCOSE BLD-MCNC: 59 MG/DL (ref 75–99)
GLUCOSE BLD-MCNC: 67 MG/DL (ref 75–99)
GLUCOSE BLD-MCNC: 71 MG/DL (ref 75–99)
GLUCOSE BLD-MCNC: 91 MG/DL (ref 75–99)
GLUCOSE BLD-MCNC: 93 MG/DL (ref 75–99)
GLUCOSE SERPL-MCNC: 64 MG/DL (ref 74–99)
HBA1C MFR BLD: 8.4 % (ref 4–6)
HCT VFR BLD AUTO: 35.4 % (ref 34–46)
HGB BLD-MCNC: 11.2 GM/DL (ref 11.4–16)
LYMPHOCYTES # SPEC AUTO: 0.9 K/UL (ref 1–4.8)
LYMPHOCYTES NFR SPEC AUTO: 7 %
MCH RBC QN AUTO: 29.2 PG (ref 25–35)
MCHC RBC AUTO-ENTMCNC: 31.5 G/DL (ref 31–37)
MCV RBC AUTO: 92.5 FL (ref 80–100)
MONOCYTES # BLD AUTO: 0.6 K/UL (ref 0–1)
MONOCYTES NFR BLD AUTO: 5 %
NEUTROPHILS # BLD AUTO: 11.3 K/UL (ref 1.3–7.7)
NEUTROPHILS NFR BLD AUTO: 86 %
PLATELET # BLD AUTO: 304 K/UL (ref 150–450)
POTASSIUM SERPL-SCNC: 3.5 MMOL/L (ref 3.5–5.1)
SODIUM SERPL-SCNC: 144 MMOL/L (ref 137–145)
WBC # BLD AUTO: 13.1 K/UL (ref 3.8–10.6)

## 2019-01-05 RX ADMIN — IPRATROPIUM BROMIDE AND ALBUTEROL SULFATE SCH ML: .5; 3 SOLUTION RESPIRATORY (INHALATION) at 16:34

## 2019-01-05 RX ADMIN — DILTIAZEM HYDROCHLORIDE SCH MG: 30 TABLET, FILM COATED ORAL at 21:05

## 2019-01-05 RX ADMIN — INSULIN ASPART SCH: 100 INJECTION, SOLUTION INTRAVENOUS; SUBCUTANEOUS at 17:37

## 2019-01-05 RX ADMIN — IPRATROPIUM BROMIDE AND ALBUTEROL SULFATE SCH ML: .5; 3 SOLUTION RESPIRATORY (INHALATION) at 03:04

## 2019-01-05 RX ADMIN — METHYLPREDNISOLONE SODIUM SUCCINATE SCH MG: 40 INJECTION, POWDER, FOR SOLUTION INTRAMUSCULAR; INTRAVENOUS at 23:07

## 2019-01-05 RX ADMIN — CEFAZOLIN SCH: 330 INJECTION, POWDER, FOR SOLUTION INTRAMUSCULAR; INTRAVENOUS at 17:42

## 2019-01-05 RX ADMIN — DILTIAZEM HYDROCHLORIDE SCH MG: 30 TABLET, FILM COATED ORAL at 17:41

## 2019-01-05 RX ADMIN — GUAIFENESIN PRN MG: 200 SOLUTION ORAL at 00:32

## 2019-01-05 RX ADMIN — AZITHROMYCIN SCH MG: 500 TABLET, FILM COATED ORAL at 08:19

## 2019-01-05 RX ADMIN — METOPROLOL SUCCINATE SCH MG: 25 TABLET, EXTENDED RELEASE ORAL at 08:19

## 2019-01-05 RX ADMIN — INSULIN ASPART SCH: 100 INJECTION, SOLUTION INTRAVENOUS; SUBCUTANEOUS at 12:08

## 2019-01-05 RX ADMIN — FUROSEMIDE SCH MG: 10 INJECTION, SOLUTION INTRAMUSCULAR; INTRAVENOUS at 21:05

## 2019-01-05 RX ADMIN — Medication SCH MG: at 21:05

## 2019-01-05 RX ADMIN — FUROSEMIDE SCH MG: 10 INJECTION, SOLUTION INTRAMUSCULAR; INTRAVENOUS at 08:19

## 2019-01-05 RX ADMIN — Medication SCH UNIT: at 08:19

## 2019-01-05 RX ADMIN — INSULIN DETEMIR SCH UNIT: 100 INJECTION, SOLUTION SUBCUTANEOUS at 12:16

## 2019-01-05 RX ADMIN — Medication SCH MG: at 08:18

## 2019-01-05 RX ADMIN — HEPARIN SODIUM SCH UNIT: 5000 INJECTION, SOLUTION INTRAVENOUS; SUBCUTANEOUS at 21:05

## 2019-01-05 RX ADMIN — IPRATROPIUM BROMIDE AND ALBUTEROL SULFATE SCH ML: .5; 3 SOLUTION RESPIRATORY (INHALATION) at 21:03

## 2019-01-05 RX ADMIN — PANTOPRAZOLE SODIUM SCH MG: 40 TABLET, DELAYED RELEASE ORAL at 06:50

## 2019-01-05 RX ADMIN — SERTRALINE HYDROCHLORIDE SCH MG: 100 TABLET ORAL at 08:18

## 2019-01-05 RX ADMIN — GUAIFENESIN PRN MG: 200 SOLUTION ORAL at 21:05

## 2019-01-05 RX ADMIN — OXYCODONE HYDROCHLORIDE AND ACETAMINOPHEN SCH MG: 500 TABLET ORAL at 08:19

## 2019-01-05 RX ADMIN — ALLOPURINOL SCH MG: 300 TABLET ORAL at 08:18

## 2019-01-05 RX ADMIN — INSULIN ASPART SCH: 100 INJECTION, SOLUTION INTRAVENOUS; SUBCUTANEOUS at 20:57

## 2019-01-05 RX ADMIN — ATORVASTATIN CALCIUM SCH MG: 10 TABLET, FILM COATED ORAL at 08:18

## 2019-01-05 RX ADMIN — HYDROXYCHLOROQUINE SULFATE SCH MG: 200 TABLET, FILM COATED ORAL at 08:18

## 2019-01-05 RX ADMIN — HEPARIN SODIUM SCH UNIT: 5000 INJECTION, SOLUTION INTRAVENOUS; SUBCUTANEOUS at 08:18

## 2019-01-05 RX ADMIN — IPRATROPIUM BROMIDE AND ALBUTEROL SULFATE SCH ML: .5; 3 SOLUTION RESPIRATORY (INHALATION) at 07:35

## 2019-01-05 RX ADMIN — IPRATROPIUM BROMIDE AND ALBUTEROL SULFATE SCH ML: .5; 3 SOLUTION RESPIRATORY (INHALATION) at 11:24

## 2019-01-05 RX ADMIN — DILTIAZEM HYDROCHLORIDE SCH MG: 30 TABLET, FILM COATED ORAL at 08:19

## 2019-01-05 RX ADMIN — ASPIRIN 81 MG CHEWABLE TABLET SCH MG: 81 TABLET CHEWABLE at 08:18

## 2019-01-05 RX ADMIN — METHYLPREDNISOLONE SODIUM SUCCINATE SCH MG: 40 INJECTION, POWDER, FOR SOLUTION INTRAMUSCULAR; INTRAVENOUS at 17:41

## 2019-01-05 RX ADMIN — INSULIN ASPART SCH: 100 INJECTION, SOLUTION INTRAVENOUS; SUBCUTANEOUS at 06:47

## 2019-01-05 RX ADMIN — HYDROXYCHLOROQUINE SULFATE SCH MG: 200 TABLET, FILM COATED ORAL at 21:05

## 2019-01-05 NOTE — PN
PROGRESS NOTE



DATE OF SERVICE:

01/05/2019



This 78-year-old woman who was admitted with COPD exacerbation also had 
bilateral

pneumonia.  The patient also had CHF also. Patient also had moderate mitral

regurgitation as well. The patient being closely monitored with Cardiology and

pulmonology following the patient closely.  Repeat chest x-ray which was 
personally

reviewed, showed evidence of cardiomegaly and features of some CHF also.  
Pulmonary

artery hypertension as well.



PAST MEDICAL HISTORY:

Reviewed.



REVIEW OF SYSTEMS:

CARDIOVASCULAR SYSTEM: No angina or palpitations.

RESPIRATION: As mentioned earlier.

GI: As mentioned earlier.

: No dysuria or hematuria.

CENTRAL NERVOUS SYSTEM: No focal deficits.



CURRENT MEDICATIONS:

Reviewed and include:

1. Tylenol 650 q.6h p.r.n.

2. Norco 5 mg.

3. DuoNeb q.i.d.

4. Zyloprim 200 mg daily.

5. Xanax 0.5 t.i.d.

6. Vitamin C 500 mg daily.

7. Aspirin 81 mg.

8. Lipitor 40 mg.

9. Zithromax 500 mg daily.

10.Dulcolax 10 mg p.r.n.

11.Tums 500 mg p.o. t.i.d.

12.Rocephin 1 g daily.

13.Vitamin D3.

14.Cardizem 30 mg daily.

15.Lasix 40 mg IV b.i.d.

16.Robitussin 200 mg q.h.s.

17.Heparin 5000 subcu b.i.d.

18.Plaquenil 200 mg p.o. b.i.d.

19.NovoLog scale.

21.Magnesium oxide 400 mg.

22.Melatonin 3 mg q.h.s.

23.Solu-Medrol 40 IV q.6 hours.

24.Toprol-XL 25 mg p.o. daily.

25.Nystatin 1 application t.i.d.

26.Protonix 40 mg.

27.Senokot-S.

28.Zoloft 100 mg daily.



PHYSICAL EXAM:

Patient is alert, oriented x3.  Pulse 74, blood pressure 117/51, respiration 18,

temperature 97.7, pulse ox 97% on 3 L.  HEENT:  Conjunctivae normal. Neck is no 
jugular

venous distention. Cardiovascular: S1, S2 muffled. RESPIRATIONS: Breath sounds

diminished in the bases. Bilateral  scattered rhonchi and crackles.  Abdomen is 
soft,

nontender.  Legs are no edema. No swelling.



LABS:

13.2,  hemoglobin 11.2, glucose 71 and 150.



ASSESSMENT:

1. Chronic obstructive pulmonary disease acute exacerbation with acute bilateral

    pneumonia possibly gram-negative, possibly aspiration.

2. Congestive heart failure acute exacerbation acute on chronic diastolic 
dysfunction

    ejection fraction 55-60% on the recent 2D echo.

3. Moderate mitral regurgitation, mild aortic stenosis.

4. Increased bilirubin.

5. Increased AST.

6. Troponin 0.06, indeterminate.

7. History of atrial fibrillation.

8. History of coronary artery disease.

9. Diabetes mellitus type 2.

10.Gastroesophageal reflux disease.

11.Hypertension.

12.Hyperlipidemia.

13.History of degenerative joint disease.

14.History of pneumonia.

15.History of recent epistaxis.

17.Anxiety and depression.



RECOMMENDATIONS AND DISCUSSION:

Recommend to continue current medications and continue monitoring, symptomatic

treatment.  Currently patient is on bronchodilators.  The patient is on broad-
spectrum

IV antibiotics.  Patient was also seen by Dr. Arias in cardiology on consult.  
We will

continue to monitor.  Will continue with the diuretics and bronchodilators and 
monitor

fluid and electrolytes balance closely.  Guarded prognosis because of multiple 
complex

medical issues and further recommendations to follow.  A BNP level was 13,600.

Troponins are noted.





MMODL / IJN: [id number] / Job#: [id number]

JULIET

## 2019-01-05 NOTE — P.CNPUL
History of Present Illness


Consult date: 01/05/19


Requesting physician: Joe Rendon


Reason for consult: dyspnea


Chief complaint: Shortness of breath, cough, congestion


History of present illness: 





This is a very pleasant 78-year-old female patient who follows with Dr. Augustin 

as her primary care physician.  She has history of atrial fibrillation, 

coronary artery disease, diabetes Stanislav, gastroesophageal reflux disease, 

hyperlipidemia, hypertension, osteoarthritis, obstructive sleep apnea, chronic 

obstructive pulmonary disease on oxygen at 3 L at home.  Previous smoking 

history.  The patient recently had undergone a procedure for a nosebleed and a 

few days later developed increasing shortness of breath cough and congestion 

and was brought here to the emergency room yesterday for the same.  As x-ray 

revealed a patchy right perihilar infiltrate and cardiomegaly.  He did have O2 

saturation of 81% on room air on presentation.  We're consulted for the same.  

She is seen today on the selective care unit.  She is awake and alert sitting 

up in a chair at the bedside.  She is somewhat of a poor historian.  She 

currently denies any worsening shortness of breath.  She has a loose 

nonproductive cough.  Taking O2 saturations in the upper 90s on 4 L/m per nasal 

cannula.  She's been afebrile.  Hemodynamically stable.  Sputum cultures 

pending.  White count 13.1.  Hemoglobin 11.2.  Creatinine 0.88.  Influenza 

screen negative.





Review of Systems


ROS unobtainable: due to mental status





Past Medical History


Past Medical History: Atrial Fibrillation, Coronary Artery Disease (CAD), 

Diabetes Mellitus, GERD/Reflux, Hyperlipidemia, Hypertension, Osteoarthritis (OA

), Pneumonia, Sleep Apnea/CPAP/BIPAP


Additional Past Medical History / Comment(s): home 02 3 liters atc,lt lower leg 

wound


History of Any Multi-Drug Resistant Organisms: None Reported


Past Surgical History: Orthopedic Surgery, Tonsillectomy


Additional Past Surgical History / Comment(s): dontae cataracts removed has lens 

implants, dontae knee replacments


Past Anesthesia/Blood Transfusion Reactions: Motion Sickness


Additional Past Anesthesia/Blood Transfusion Reaction / Comment(s): 

clausterphobia


Past Psychological History: Anxiety, Depression


Additional Psychological History / Comment(s): lives at [facility] 

apts. does has hired help for bathing, med management. uses walker/w/c/cane. 

has raised toilet seat, o2 3 liters n/c cpap machine


Smoking Status: Former smoker


Past Alcohol Use History: None Reported


Additional Past Alcohol Use History / Comment(s): started smoking age 21 (1961)

and quit 1973 smopked less than 1 ppd. no alcohol now


Past Drug Use History: None Reported





- Past Family History


  ** Father


Family Medical History: Coronary Artery Disease (CAD)


Additional Family Medical History / Comment(s): enlarged heart





  ** Mother


Family Medical History: Dementia, Hypertension


Additional Family Medical History / Comment(s): hysterectomy-d/t benign tumor





Medications and Allergies


 Home Medications











 Medication  Instructions  Recorded  Confirmed  Type


 


Acetaminophen Tab [Tylenol] 650 mg PO Q6H PRN 07/27/18 01/04/19 History


 


Albuterol Inhaler [Ventolin Hfa 2 puff INHALATION RT-Q4H PRN 07/27/18 01/04/19 

History





Inhaler]    


 


Ascorbic Acid [Vitamin C] 500 mg PO DAILY 07/27/18 01/04/19 History


 


Aspirin EC [Ecotrin Low Dose] 81 mg PO DAILY 07/27/18 01/04/19 History


 


Atorvastatin [Lipitor] 10 mg PO DAILY 07/27/18 01/04/19 History


 


Cholecalciferol (Vitamin D3) 2,000 unit PO DAILY 07/27/18 01/04/19 History





[Vitamin D3]    


 


Ferrous Gluconate 324 mg PO MOWEFR 07/27/18 01/04/19 History


 


Furosemide [Lasix] 40 mg PO BID 07/27/18 01/04/19 History


 


Insulin Glargine,Hum.rec.anlog 25 unit SQ DAILY 07/27/18 01/04/19 History





[Lantus Solostar]    


 


Insulin Lispro [humaLOG Kwikpen] See Protocol SQ BID 07/27/18 01/04/19 History


 


Ipratropium-Albuterol Nebulize 3 ml INHALATION RT-QID 07/27/18 01/04/19 History





[Duoneb 0.5 mg-3 mg/3 ml Soln]    


 


Nystatin 100,000Unit/gm Cream 1 applic TOPICAL BID PRN 07/27/18 01/04/19 History





[Mycostatin Cream]    


 


Sertraline [Zoloft] 100 mg PO DAILY 07/27/18 01/04/19 History


 


Diltiazem Oral [Cardizem*] 30 mg PO TID #90 tab 07/30/18 01/04/19 Rx


 


HYDROcodone/APAP 5-325MG [Norco 1 tab PO Q6HR PRN #12 tab 07/30/18 01/04/19 Rx





5-325]    


 


Metoprolol Succinate (ER) [Toprol 25 mg PO DAILY #30 tab.er.24h 07/30/18 01/04/ 19 Rx





XL]    


 


Allopurinol [Zyloprim] 300 mg PO DAILY 01/04/19 01/04/19 History


 


Bisacodyl [Dulcolax] 10 mg RECTAL ONCE PRN 01/04/19 01/04/19 History


 


Calcium Carbonate [Tums] 500 mg PO TID PRN 01/04/19 01/04/19 History


 


Hydroxychloroquine Sulfate 200 mg PO BID 01/04/19 01/04/19 History





[Plaquenil]    


 


Magnesium Oxide [Mag-Ox] 400 mg PO DAILY 01/04/19 01/04/19 History


 


Melatonin 3 mg PO HS 01/04/19 01/04/19 History


 


Omeprazole 20 mg PO DAILY 01/04/19 01/04/19 History


 


Sennosides/Docusate Sodium 2 tab PO TUTHSA PRN 01/04/19 01/04/19 History





[Senna-S Laxative Tablet]    


 


Sodium Chloride [Ocean] 2 spray EA NOSTRIL TID 01/04/19 01/04/19 History











 Allergies











Allergy/AdvReac Type Severity Reaction Status Date / Time


 


Iodinated Contrast- Oral and Allergy  Unknown Verified 01/04/19 16:01





IV Dye     


 


Penicillins Allergy  Unknown Verified 01/04/19 16:01


 


vancomycin Allergy  Unknown Verified 01/04/19 16:01














Physical Exam


Vitals: 


 Vital Signs











  Temp Pulse Pulse Resp BP BP BP


 


 01/05/19 11:32   72     


 


 01/05/19 11:25   78     


 


 01/05/19 08:00  98.7 F   65  18   109/50 


 


 01/05/19 07:44   70     


 


 01/05/19 07:37   72     


 


 01/05/19 03:18  99 F   69  18   97/41 


 


 01/05/19 03:13   74     


 


 01/05/19 03:04   70     


 


 01/05/19 00:00  100 F H   73  19   128/56 


 


 01/04/19 23:31   88     


 


 01/04/19 23:21   86     


 


 01/04/19 20:35   72     


 


 01/04/19 20:17   72     


 


 01/04/19 20:00  99.6 F   88  19    121/69


 


 01/04/19 18:53  98.4 F      


 


 01/04/19 18:00   82   18  117/56  


 


 01/04/19 17:00   74   17  104/49  


 


 01/04/19 16:00   70   22  113/69  


 


 01/04/19 15:48   81     


 


 01/04/19 15:36   73     


 


 01/04/19 15:08       


 


 01/04/19 15:06     25 H   


 


 01/04/19 15:05  98.0 F  73   25 H  124/75  


 


 01/04/19 15:00   79   16  116/66  


 


 01/04/19 14:56       














  Pulse Ox


 


 01/05/19 11:32 


 


 01/05/19 11:25 


 


 01/05/19 08:00  97


 


 01/05/19 07:44 


 


 01/05/19 07:37 


 


 01/05/19 03:18  98


 


 01/05/19 03:13 


 


 01/05/19 03:04 


 


 01/05/19 00:00  99


 


 01/04/19 23:31 


 


 01/04/19 23:21 


 


 01/04/19 20:35 


 


 01/04/19 20:17 


 


 01/04/19 20:00  96


 


 01/04/19 18:53 


 


 01/04/19 18:00  95


 


 01/04/19 17:00  92 L


 


 01/04/19 16:00  93 L


 


 01/04/19 15:48 


 


 01/04/19 15:36 


 


 01/04/19 15:08  93 L


 


 01/04/19 15:06 


 


 01/04/19 15:05  81 L


 


 01/04/19 15:00  93 L


 


 01/04/19 14:56  93 L








 Intake and Output











 01/04/19 01/05/19 01/05/19





 22:59 06:59 14:59


 


Other:   


 


  Voiding Method Diaper Diaper Diaper





 Incontinent Incontinent Incontinent


 


  # Voids 2 2 


 


  Weight 95.5 kg 96 kg 














- Constitutional


General appearance: mild distress, morbidly obese





- EENT


Eyes: EOMI, PERRLA


ENT: hard of hearing


Ears: bilateral: normal





- Neck


Neck: normal ROM


Carotids: bilateral: upstroke normal


Thyroid: bilateral: normal size





- Respiratory


Respiratory: right: rhonchi





- Cardiovascular


Rhythm: regular


Heart sounds: normal: S1, S2





- Gastrointestinal


General gastrointestinal: normal bowel sounds





- Integumentary


Integumentary: normal turgor





- Neurologic


Neurologic: CNII-XII intact





- Musculoskeletal


Musculoskeletal: generalized weakness





- Psychiatric





Alert and oriented to person.  Poor historian.





Results





- Laboratory Findings


CBC and BMP: 


 01/05/19 06:05





 01/05/19 06:05


PT/INR, D-dimer











PT  10.9 sec (9.0-12.0)   01/04/19  15:00    


 


INR  1.0  (<1.2)   01/04/19  15:00    








Abnormal lab findings: 


 Abnormal Labs











  01/04/19 01/04/19 01/04/19





  15:00 15:00 15:00


 


WBC   12.8 H 


 


Hgb   


 


Neutrophils #   11.2 H 


 


Lymphocytes #   0.8 L 


 


Carbon Dioxide   


 


BUN    38 H


 


Glucose    70 L


 


POC Glucose (mg/dL)   


 


Total Bilirubin    1.4 H


 


AST    45 H


 


Alkaline Phosphatase    132 H


 


CK-MB (CK-2)  6.3 H  


 


Troponin I  0.067 H*  














  01/04/19 01/05/19 01/05/19





  20:25 06:05 06:05


 


WBC   13.1 H 


 


Hgb   11.2 L 


 


Neutrophils #   11.3 H 


 


Lymphocytes #   0.9 L 


 


Carbon Dioxide    32 H


 


BUN    39 H


 


Glucose    64 L


 


POC Glucose (mg/dL)  59 L  


 


Total Bilirubin   


 


AST   


 


Alkaline Phosphatase   


 


CK-MB (CK-2)   


 


Troponin I   














  01/05/19 01/05/19 01/05/19





  06:18 07:31 11:10


 


WBC   


 


Hgb   


 


Neutrophils #   


 


Lymphocytes #   


 


Carbon Dioxide   


 


BUN   


 


Glucose   


 


POC Glucose (mg/dL)  71 L  150 H  140 H


 


Total Bilirubin   


 


AST   


 


Alkaline Phosphatase   


 


CK-MB (CK-2)   


 


Troponin I   














- Diagnostic Findings


Chest x-ray: image reviewed





Assessment and Plan


Assessment: 





Impression:





#1 Acute hypoxic respiratory failure secondary to a right perihilar infiltrate 

suspect aspiration pneumonia.





#2 Recent procedure for epistaxis.





#3 History of atrial fibrillation, currently sinus rhythm.





#4 Coronary artery disease.





#5 Diabetes mellitus.





#6 Gastroesophageal reflux disease.





#7 Hypertension.





#8 Hyperlipidemia.





#9 Osteoarthritis.





#10 Former smoker.





#11 Anxiety/depression.





Plan:





The patient was seen and evaluated by Dr. Arias.  Chest x-ray and labs were 

reviewed.  Follow-up chest x-ray artery showing improvement.  We will continue 

with antibiotics in the form of ceftriaxone and azithromycin.  Continue 

Bronchodilators.  Continue diuretics.  Titrate down the FiO2 as tolerated.  

Heparin for DVT prophylaxis.  We'll continue to follow and make further 

recommendations based on her clinical status. 





I, the cosigning physician, performed a history & physical examination of the 

patient. Lungs sounds echo the posterior bases.  Maintaining good O2 

saturations in the 90s on 4 L/m per nasal cannula.  I discussed the assessment 

and plan of care with my nurse practitioner, Etta Adames. I attest to the above 

note as dictated by her.


Time with Patient: Greater than 30

## 2019-01-05 NOTE — P.CRDCN
History of Present Illness


Consult date: 01/05/19


Requesting physician: Ubaldo E Sheet


History of present illness: 





This is a very pleasant 78-year-old female patient she is a fairly poor 

historian, who has history of atrial fibrillation, coronary artery disease, 

diabetes , gastroesophageal reflux disease, hyperlipidemia, hypertension, 

osteoarthritis, obstructive sleep apnea, chronic obstructive pulmonary disease 

on oxygen at 3 L at home.  Previous smoking history.  The patient recently had 

undergone a procedure for a nosebleed and a few days later developed increasing 

shortness of breath cough and congestion and was brought here to the emergency 

room yesterday for the same.  Initial Chest -ray revealed a patchy right 

perihilar infiltrate and cardiomegaly.  Repeat chest x-ray performed today 

showed cardiomegaly with prominence of the pulmonary arteries suggesting some 

pulmonary artery hypertension.  Cannot exclude bilateral effusions.  White 

blood cell count 13.1, hemoglobin 11.2, platelet count 304.  Sodium 144, 

potassium 3.5, BUN 39, creatinine 0.8.  Magnesium 1.4, alk phos 132, AST 45, 

ALT 47.  Initial troponin 0.067.  BNP level 13,600.  Influenza A and B are 

negative.  Patient was initiated on IV antibiotics for pneumonia, she has also 

been started on IV Lasix.  According to the patient, today she started putting 

out a significant amount of urine.  He is to cough up dark tan sputum.  Blood 

pressure this morning 122/60 with a heart rate in the 60s, 96% on 4 L.





Past Medical History


Past Medical History: Atrial Fibrillation, Coronary Artery Disease (CAD), 

Diabetes Mellitus, GERD/Reflux, Hyperlipidemia, Hypertension, Osteoarthritis (OA

), Pneumonia, Sleep Apnea/CPAP/BIPAP


Additional Past Medical History / Comment(s): home 02 3 liters atc,lt lower leg 

wound


History of Any Multi-Drug Resistant Organisms: None Reported


Past Surgical History: Orthopedic Surgery, Tonsillectomy


Additional Past Surgical History / Comment(s): dontae cataracts removed has lens 

implants, dontae knee replacments


Past Anesthesia/Blood Transfusion Reactions: Motion Sickness


Additional Past Anesthesia/Blood Transfusion Reaction / Comment(s): 

clausterphobia


Past Psychological History: Anxiety, Depression


Additional Psychological History / Comment(s): lives at [facility] 

apts. does has hired help for bathing, med management. uses walker/w/c/cane. 

has raised toilet seat, o2 3 liters n/c cpap machine


Smoking Status: Former smoker


Past Alcohol Use History: None Reported


Additional Past Alcohol Use History / Comment(s): started smoking age 21 (1961)

and quit 1973 smopked less than 1 ppd. no alcohol now


Past Drug Use History: None Reported





- Past Family History


  ** Father


Family Medical History: Coronary Artery Disease (CAD)


Additional Family Medical History / Comment(s): enlarged heart





  ** Mother


Family Medical History: Dementia, Hypertension


Additional Family Medical History / Comment(s): hysterectomy-d/t benign tumor





Medications and Allergies


 Home Medications











 Medication  Instructions  Recorded  Confirmed  Type


 


Acetaminophen Tab [Tylenol] 650 mg PO Q6H PRN 07/27/18 01/04/19 History


 


Albuterol Inhaler [Ventolin Hfa 2 puff INHALATION RT-Q4H PRN 07/27/18 01/04/19 

History





Inhaler]    


 


Ascorbic Acid [Vitamin C] 500 mg PO DAILY 07/27/18 01/04/19 History


 


Aspirin EC [Ecotrin Low Dose] 81 mg PO DAILY 07/27/18 01/04/19 History


 


Atorvastatin [Lipitor] 10 mg PO DAILY 07/27/18 01/04/19 History


 


Cholecalciferol (Vitamin D3) 2,000 unit PO DAILY 07/27/18 01/04/19 History





[Vitamin D3]    


 


Ferrous Gluconate 324 mg PO MOWEFR 07/27/18 01/04/19 History


 


Furosemide [Lasix] 40 mg PO BID 07/27/18 01/04/19 History


 


Insulin Glargine,Hum.rec.anlog 25 unit SQ DAILY 07/27/18 01/04/19 History





[Lantus Solostar]    


 


Insulin Lispro [humaLOG Kwikpen] See Protocol SQ BID 07/27/18 01/04/19 History


 


Ipratropium-Albuterol Nebulize 3 ml INHALATION RT-QID 07/27/18 01/04/19 History





[Duoneb 0.5 mg-3 mg/3 ml Soln]    


 


Nystatin 100,000Unit/gm Cream 1 applic TOPICAL BID PRN 07/27/18 01/04/19 History





[Mycostatin Cream]    


 


Sertraline [Zoloft] 100 mg PO DAILY 07/27/18 01/04/19 History


 


Diltiazem Oral [Cardizem*] 30 mg PO TID #90 tab 07/30/18 01/04/19 Rx


 


HYDROcodone/APAP 5-325MG [Norco 1 tab PO Q6HR PRN #12 tab 07/30/18 01/04/19 Rx





5-325]    


 


Metoprolol Succinate (ER) [Toprol 25 mg PO DAILY #30 tab.er.24h 07/30/18 01/04/ 19 Rx





XL]    


 


Allopurinol [Zyloprim] 300 mg PO DAILY 01/04/19 01/04/19 History


 


Bisacodyl [Dulcolax] 10 mg RECTAL ONCE PRN 01/04/19 01/04/19 History


 


Calcium Carbonate [Tums] 500 mg PO TID PRN 01/04/19 01/04/19 History


 


Hydroxychloroquine Sulfate 200 mg PO BID 01/04/19 01/04/19 History





[Plaquenil]    


 


Magnesium Oxide [Mag-Ox] 400 mg PO DAILY 01/04/19 01/04/19 History


 


Melatonin 3 mg PO HS 01/04/19 01/04/19 History


 


Omeprazole 20 mg PO DAILY 01/04/19 01/04/19 History


 


Sennosides/Docusate Sodium 2 tab PO TUTHSA PRN 01/04/19 01/04/19 History





[Senna-S Laxative Tablet]    


 


Sodium Chloride [Ocean] 2 spray EA NOSTRIL TID 01/04/19 01/04/19 History











 Allergies











Allergy/AdvReac Type Severity Reaction Status Date / Time


 


Iodinated Contrast- Oral and Allergy  Unknown Verified 01/04/19 16:01





IV Dye     


 


Penicillins Allergy  Unknown Verified 01/04/19 16:01


 


vancomycin Allergy  Unknown Verified 01/04/19 16:01














Physical Exam


Vitals: 


 Vital Signs











  Temp Pulse Pulse Resp BP BP BP


 


 01/05/19 12:00  98.8 F   69  18   122/69 


 


 01/05/19 11:32   72     


 


 01/05/19 11:25   78     


 


 01/05/19 08:00  98.7 F   65  18   109/50 


 


 01/05/19 07:44   70     


 


 01/05/19 07:37   72     


 


 01/05/19 03:18  99 F   69  18   97/41 


 


 01/05/19 03:13   74     


 


 01/05/19 03:04   70     


 


 01/05/19 00:00  100 F H   73  19   128/56 


 


 01/04/19 23:31   88     


 


 01/04/19 23:21   86     


 


 01/04/19 20:35   72     


 


 01/04/19 20:17   72     


 


 01/04/19 20:00  99.6 F   88  19    121/69


 


 01/04/19 18:53  98.4 F      


 


 01/04/19 18:00   82   18  117/56  


 


 01/04/19 17:00   74   17  104/49  


 


 01/04/19 16:00   70   22  113/69  


 


 01/04/19 15:48   81     


 


 01/04/19 15:36   73     


 


 01/04/19 15:08       


 


 01/04/19 15:06     25 H   


 


 01/04/19 15:05  98.0 F  73   25 H  124/75  


 


 01/04/19 15:00   79   16  116/66  


 


 01/04/19 14:56       














  Pulse Ox


 


 01/05/19 12:00  96


 


 01/05/19 11:32 


 


 01/05/19 11:25 


 


 01/05/19 08:00  97


 


 01/05/19 07:44 


 


 01/05/19 07:37 


 


 01/05/19 03:18  98


 


 01/05/19 03:13 


 


 01/05/19 03:04 


 


 01/05/19 00:00  99


 


 01/04/19 23:31 


 


 01/04/19 23:21 


 


 01/04/19 20:35 


 


 01/04/19 20:17 


 


 01/04/19 20:00  96


 


 01/04/19 18:53 


 


 01/04/19 18:00  95


 


 01/04/19 17:00  92 L


 


 01/04/19 16:00  93 L


 


 01/04/19 15:48 


 


 01/04/19 15:36 


 


 01/04/19 15:08  93 L


 


 01/04/19 15:06 


 


 01/04/19 15:05  81 L


 


 01/04/19 15:00  93 L


 


 01/04/19 14:56  93 L








 Intake and Output











 01/04/19 01/05/19 01/05/19





 22:59 06:59 14:59


 


Intake Total   240


 


Balance   240


 


Intake:   


 


  Oral   240


 


Other:   


 


  Voiding Method Diaper Diaper Diaper





 Incontinent Incontinent Incontinent


 


  # Voids 2 2 


 


  Weight 95.5 kg 96 kg 














PHYSICAL EXAMINATION: 





GENERAL: 78-year-old  female in no acute distress at the time of my 

examination





HEENT: Head is atraumatic, normocephalic.  Pupils equal, round.  Sclera 

anicteric. Conjunctiva are clear.  Mucous membranes of the mouth are moist.  

Neck is supple.  There is  elevated jugular venous pressure.  No carotid  bruit 

is heard.





HEART EXAMINATION: Heart S1, S2 normal.  No murmur or gallop heard.





CHEST EXAMINATION: Lungs reveal scattered coarse rhonchi and wheezing throughout

, diminished air entry to the bases.





ABDOMEN:  Soft, nontender. Bowel sounds are heard. No organomegaly noted.


 


EXTREMITIES: 2+ peripheral pulses with  evidence of peripheral edema and no 

calf tenderness noted.





NEUROLOGIC patient is awake, alert and oriented 3 .


 


.


 








Results





 01/05/19 06:05





 01/05/19 06:05


 Cardiac Enzymes











  01/04/19 01/04/19 Range/Units





  15:00 15:00 


 


AST   45 H  (14-36)  U/L


 


CK-MB (CK-2)  6.3 H   (0.0-2.4)  ng/mL


 


Troponin I  0.067 H*   (0.000-0.034)  ng/mL








 Coagulation











  01/04/19 Range/Units





  15:00 


 


PT  10.9  (9.0-12.0)  sec


 


APTT  23.7  (22.0-30.0)  sec








 CBC











  01/04/19 01/05/19 Range/Units





  15:00 06:05 


 


WBC  12.8 H  13.1 H  (3.8-10.6)  k/uL


 


RBC  4.21  3.83  (3.80-5.40)  m/uL


 


Hgb  12.2  11.2 L  (11.4-16.0)  gm/dL


 


Hct  38.4  35.4  (34.0-46.0)  %


 


Plt Count  335  304  (150-450)  k/uL








 Comprehensive Metabolic Panel











  01/04/19 01/05/19 Range/Units





  15:00 06:05 


 


Sodium  140  144  (137-145)  mmol/L


 


Potassium  3.7  3.5  (3.5-5.1)  mmol/L


 


Chloride  100  103  ()  mmol/L


 


Carbon Dioxide  28  32 H  (22-30)  mmol/L


 


BUN  38 H  39 H  (7-17)  mg/dL


 


Creatinine  0.72  0.88  (0.52-1.04)  mg/dL


 


Glucose  70 L  64 L  (74-99)  mg/dL


 


Calcium  9.7  9.2  (8.4-10.2)  mg/dL


 


AST  45 H   (14-36)  U/L


 


ALT  47   (9-52)  U/L


 


Alkaline Phosphatase  132 H   ()  U/L


 


Total Protein  6.8   (6.3-8.2)  g/dL


 


Albumin  3.5   (3.5-5.0)  g/dL








 Current Medications











Generic Name Dose Route Start Last Admin





  Trade Name Freq  PRN Reason Stop Dose Admin


 


Acetaminophen  650 mg  01/04/19 18:09  01/04/19 23:12





  Tylenol Tab  PO   650 mg





  Q6H PRN   Administration





  MILD PAIN/FEVER   





     





     





     


 


Hydrocodone Bitart/Acetaminophen  1 each  01/04/19 18:09  





  [place] 5-325  PO   





  Q6HR PRN   





  MODERATE Pain   





     





     





     


 


Albuterol/Ipratropium  3 ml  01/04/19 20:00  01/05/19 11:24





  Duoneb 0.5 Mg-3 Mg/3 Ml Soln  INHALATION   3 ml





  RT-Q4H GABRIEL   Administration





     





     





     





     


 


Allopurinol  300 mg  01/05/19 09:00  01/05/19 08:18





  Zyloprim  PO   300 mg





  DAILY [facility]   Administration





     





     





     





     


 


Alprazolam  0.25 mg  01/04/19 22:13  





  Xanax  PO   





  TID PRN   





  Anxiety   





     





     





     


 


Ascorbic Acid  500 mg  01/05/19 09:00  01/05/19 08:19





  Vitamin C  PO   500 mg





  DAILY [facility]   Administration





     





     





     





     


 


Aspirin  81 mg  01/05/19 09:00  01/05/19 08:18





  Aspirin  PO   81 mg





  DAILY [facility]   Administration





     





     





     





     


 


Atorvastatin Calcium  10 mg  01/05/19 09:00  01/05/19 08:18





  Lipitor  PO   10 mg





  DAILY [facility]   Administration





     





     





     





     


 


Azithromycin  500 mg  01/05/19 09:00  01/05/19 08:19





  Zithromax  PO   500 mg





  DAILY [facility]   Administration





     





     





     





     


 


Bisacodyl  10 mg  01/04/19 21:58  





  Dulcolax  RECTAL   





  ONCE PRN   





  Constipation   





     





     





     


 


Calcium Carbonate/Glycine  500 mg  01/04/19 18:09  





  Tums  PO   





  TID PRN   





  GI Upset   





     





     





     


 


Cholecalciferol  2,000 unit  01/05/19 09:00  01/05/19 08:19





  Vitamin D3  PO   2,000 unit





  DAILY [facility]   Administration





     





     





     





     


 


Diltiazem HCl  30 mg  01/04/19 22:00  01/05/19 08:19





  Cardizem Oral  PO   30 mg





  TID [facility]   Administration





     





     





     





     


 


Furosemide  40 mg  01/04/19 23:00  01/05/19 08:19





  Lasix  IV   40 mg





  Q12HR GABRIEL   Administration





     





     





     





     


 


Guaifenesin  200 mg  01/04/19 23:34  01/05/19 00:32





  Robitussin  PO   200 mg





  Q6H PRN   Administration





  Cough   





     





     





     


 


Heparin Sodium (Porcine)  5,000 unit  01/05/19 09:00  01/05/19 08:18





  Heparin  SQ   5,000 unit





  Q12HR GABRIEL   Administration





     





     





     





     


 


Hydroxychloroquine Sulfate  200 mg  01/04/19 21:00  01/05/19 08:18





  Plaquenil  PO   200 mg





  BID GABRIEL   Administration





     





     





     





     


 


Sodium Chloride  1,000 mls @ 20 mls/hr  01/04/19 15:22  01/04/19 20:36





  Saline 0.9%  IV  01/05/19 15:21  Not Given





  .Q24H STA   





     





     





     





     


 


Ceftriaxone Sodium 1,000 mg/  50 mls @ 100 mls/hr  01/05/19 09:00  01/05/19 08:

15





  Sodium Chloride  IVPB  01/08/19 09:01  100 mls/hr





  Q24HR GABRIEL   Administration





     





     





     





     


 


Sodium Chloride  1,000 mls @ 20 mls/hr  01/04/19 18:15  01/04/19 20:44





  Saline 0.9%  IV   20 mls/hr





  .Q24H GABRIEL   Administration





     





     





     





     


 


Insulin Aspart  0 unit  01/04/19 21:00  01/05/19 12:08





  Novolog  SQ   Not Given





  [facility]   





     





     





  Protocol   





     


 


Insulin Detemir  25 unit  01/05/19 09:00  01/05/19 12:16





  Levemir  SQ   25 unit





  DAILY [facility]ABRIEL   Administration





     





     





     





     


 


Magnesium Oxide  400 mg  01/05/19 09:00  01/05/19 08:18





  Mag-Ox  PO   400 mg





  DAILY [facility]ABRIEL   Administration





     





     





     





     


 


Melatonin  3 mg  01/04/19 21:00  01/04/19 23:12





  Melatonin  PO   3 mg





  HS GABRIEL   Administration





     





     





     





     


 


Methylprednisolone Sodium Succinate  40 mg  01/05/19 16:00  





  Solu-Medrol  IV   





  Q8HR GABRIEL   





     





     





     





     


 


Metoprolol Succinate  25 mg  01/05/19 09:00  01/05/19 08:19





  Toprol Xl  PO   25 mg





  DAILY [facility]ABRIEL   Administration





     





     





     





     


 


Miscellaneous Information  1 each  01/04/19 18:05  





  Pneumonia Protocol Utilized  PO   





  ONCE PRN   





  Per Protocol   





     





     





     


 


Nystatin  1 applic  01/04/19 18:09  





  Mycostatin Cream  TOPICAL   





  BID PRN   





  Rash   





     





     





     


 


Pantoprazole Sodium  40 mg  01/05/19 07:30  01/05/19 06:50





  Protonix  PO   40 mg





  AC-BRKFST GABRIEL   Administration





     





     





     





     


 


Senna/Docusate Sodium  2 each  01/04/19 18:09  





  Senokot-S  PO   





  TUTHSA PRN   





  Constipation   





     





     





     


 


Sertraline HCl  100 mg  01/05/19 09:00  01/05/19 08:18





  Zoloft  PO   100 mg





  DAILY GABRIEL   Administration





     





     





     





     








 Intake and Output











 01/04/19 01/05/19 01/05/19





 22:59 06:59 14:59


 


Intake Total   240


 


Balance   240


 


Intake:   


 


  Oral   240


 


Other:   


 


  Voiding Method Diaper Diaper Diaper





 Incontinent Incontinent Incontinent


 


  # Voids 2 2 


 


  Weight 95.5 kg 96 kg 








 





 01/05/19 06:05 





 01/05/19 06:05 











EKG Interpretations (text)


EKG shows a normal sinus rhythm with occasional PVC and nonspecific ST-T wave 

changes








Assessment and Plan


Plan: 





Assessment and plan





#1 Acute hypoxic respiratory failure secondary to a right perihilar infiltrate 

suspect aspiration pneumonia.





#2 Recent procedure for epistaxis.





#3 History of atrial fibrillation, , paroxysmal, currently sinus rhythm.





#4 Coronary artery disease.  Follows with a cardiologist out of town





#5 Diabetes mellitus.





#6 Gastroesophageal reflux disease.





#7 Hypertension.





#8 Hyperlipidemia.





#9 Osteoarthritis.





#10 Former smoker.





#11 diastolic congestive heart failure acute on chronic.








Plan





We will continue current dose of IV Lasix, continue to monitor intake and 

output along with daily weights and daily lytes BUN and creatinine.  Patient 

did have an echocardiogram with Doppler study performed in July 2018 which 

revealed moderate to severe aortic valve sclerosis, severe mitral calcification

, moderate mitral regurg, severe tricuspid regurg, severe pulmonary 

hypertension.  We will repeat an echo on this mission.  Continue to follow.





DNP note has been reviewed, I agree with a documented findings and plan of 

care.  Patient was seen and examined.

## 2019-01-05 NOTE — XR
EXAMINATION TYPE: XR chest 2V

 

DATE OF EXAM: 1/5/2019

 

HISTORY: pneumonia.

 

REFERENCE: Previous study dated 1/4/2019.

 

FINDINGS: Unfortunately, the patient's head projects over the upper chest.

 

The heart is enlarged. There is prominence of the pulmonary arteries. There is improved aeration of t
he right lung. There is mild blunting of the left CP angle.

 

IMPRESSION: 

1. CARDIOMEGALY WITH PROMINENCE OF THE PULMONARY ARTERIES SUGGESTS SOME PULMONARY ARTERY HYPERTENSION
.

2. IMPROVED AERATION, RIGHT LUNG.

3. I COULD NOT EXCLUDE SMALL, BILATERAL EFFUSIONS.

## 2019-01-06 LAB
ANION GAP SERPL CALC-SCNC: 10 MMOL/L
BASOPHILS # BLD AUTO: 0 K/UL (ref 0–0.2)
BASOPHILS NFR BLD AUTO: 0 %
BUN SERPL-SCNC: 45 MG/DL (ref 7–17)
CALCIUM SPEC-MCNC: 9.5 MG/DL (ref 8.4–10.2)
CHLORIDE SERPL-SCNC: 101 MMOL/L (ref 98–107)
CO2 SERPL-SCNC: 28 MMOL/L (ref 22–30)
EOSINOPHIL # BLD AUTO: 0.1 K/UL (ref 0–0.7)
EOSINOPHIL NFR BLD AUTO: 1 %
ERYTHROCYTE [DISTWIDTH] IN BLOOD BY AUTOMATED COUNT: 4.03 M/UL (ref 3.8–5.4)
ERYTHROCYTE [DISTWIDTH] IN BLOOD: 14.8 % (ref 11.5–15.5)
GLUCOSE BLD-MCNC: 178 MG/DL (ref 75–99)
GLUCOSE BLD-MCNC: 201 MG/DL (ref 75–99)
GLUCOSE BLD-MCNC: 228 MG/DL (ref 75–99)
GLUCOSE BLD-MCNC: 237 MG/DL (ref 75–99)
GLUCOSE SERPL-MCNC: 163 MG/DL (ref 74–99)
HCT VFR BLD AUTO: 38.9 % (ref 34–46)
HGB BLD-MCNC: 11.5 GM/DL (ref 11.4–16)
HYALINE CASTS UR QL AUTO: 55 /LPF (ref 0–2)
LYMPHOCYTES # SPEC AUTO: 0.3 K/UL (ref 1–4.8)
LYMPHOCYTES NFR SPEC AUTO: 4 %
MCH RBC QN AUTO: 28.5 PG (ref 25–35)
MCHC RBC AUTO-ENTMCNC: 29.6 G/DL (ref 31–37)
MCV RBC AUTO: 96.4 FL (ref 80–100)
MONOCYTES # BLD AUTO: 0.1 K/UL (ref 0–1)
MONOCYTES NFR BLD AUTO: 1 %
NEUTROPHILS # BLD AUTO: 8 K/UL (ref 1.3–7.7)
NEUTROPHILS NFR BLD AUTO: 94 %
PH UR: 5 [PH] (ref 5–8)
PLATELET # BLD AUTO: 287 K/UL (ref 150–450)
POTASSIUM SERPL-SCNC: 4.4 MMOL/L (ref 3.5–5.1)
RBC UR QL: 1 /HPF (ref 0–5)
SODIUM SERPL-SCNC: 139 MMOL/L (ref 137–145)
SP GR UR: 1.01 (ref 1–1.03)
SQUAMOUS UR QL AUTO: 2 /HPF (ref 0–4)
UROBILINOGEN UR QL STRIP: 2 MG/DL (ref ?–2)
WBC # BLD AUTO: 8.5 K/UL (ref 3.8–10.6)
WBC #/AREA URNS HPF: 1 /HPF (ref 0–5)

## 2019-01-06 RX ADMIN — ALLOPURINOL SCH MG: 300 TABLET ORAL at 07:24

## 2019-01-06 RX ADMIN — IPRATROPIUM BROMIDE AND ALBUTEROL SULFATE SCH ML: .5; 3 SOLUTION RESPIRATORY (INHALATION) at 04:27

## 2019-01-06 RX ADMIN — PANTOPRAZOLE SODIUM SCH MG: 40 TABLET, DELAYED RELEASE ORAL at 06:22

## 2019-01-06 RX ADMIN — IPRATROPIUM BROMIDE AND ALBUTEROL SULFATE SCH ML: .5; 3 SOLUTION RESPIRATORY (INHALATION) at 08:28

## 2019-01-06 RX ADMIN — DILTIAZEM HYDROCHLORIDE SCH MG: 30 TABLET, FILM COATED ORAL at 07:24

## 2019-01-06 RX ADMIN — METHYLPREDNISOLONE SODIUM SUCCINATE SCH MG: 40 INJECTION, POWDER, FOR SOLUTION INTRAMUSCULAR; INTRAVENOUS at 07:21

## 2019-01-06 RX ADMIN — AZITHROMYCIN SCH MG: 500 TABLET, FILM COATED ORAL at 07:24

## 2019-01-06 RX ADMIN — Medication SCH MG: at 20:34

## 2019-01-06 RX ADMIN — INSULIN ASPART SCH UNIT: 100 INJECTION, SOLUTION INTRAVENOUS; SUBCUTANEOUS at 11:33

## 2019-01-06 RX ADMIN — IPRATROPIUM BROMIDE AND ALBUTEROL SULFATE SCH ML: .5; 3 SOLUTION RESPIRATORY (INHALATION) at 16:51

## 2019-01-06 RX ADMIN — METHYLPREDNISOLONE SODIUM SUCCINATE SCH MG: 40 INJECTION, POWDER, FOR SOLUTION INTRAMUSCULAR; INTRAVENOUS at 16:58

## 2019-01-06 RX ADMIN — METOPROLOL SUCCINATE SCH MG: 25 TABLET, EXTENDED RELEASE ORAL at 07:24

## 2019-01-06 RX ADMIN — HYDROXYCHLOROQUINE SULFATE SCH MG: 200 TABLET, FILM COATED ORAL at 07:24

## 2019-01-06 RX ADMIN — GUAIFENESIN PRN MG: 200 SOLUTION ORAL at 07:30

## 2019-01-06 RX ADMIN — FUROSEMIDE SCH MG: 10 INJECTION, SOLUTION INTRAMUSCULAR; INTRAVENOUS at 07:23

## 2019-01-06 RX ADMIN — HEPARIN SODIUM SCH UNIT: 5000 INJECTION, SOLUTION INTRAVENOUS; SUBCUTANEOUS at 07:23

## 2019-01-06 RX ADMIN — HYDROXYCHLOROQUINE SULFATE SCH MG: 200 TABLET, FILM COATED ORAL at 20:34

## 2019-01-06 RX ADMIN — Medication SCH UNIT: at 07:24

## 2019-01-06 RX ADMIN — INSULIN DETEMIR SCH UNIT: 100 INJECTION, SOLUTION SUBCUTANEOUS at 08:37

## 2019-01-06 RX ADMIN — IPRATROPIUM BROMIDE AND ALBUTEROL SULFATE SCH ML: .5; 3 SOLUTION RESPIRATORY (INHALATION) at 21:50

## 2019-01-06 RX ADMIN — INSULIN ASPART SCH: 100 INJECTION, SOLUTION INTRAVENOUS; SUBCUTANEOUS at 06:05

## 2019-01-06 RX ADMIN — GUAIFENESIN PRN MG: 200 SOLUTION ORAL at 21:23

## 2019-01-06 RX ADMIN — INSULIN ASPART SCH UNIT: 100 INJECTION, SOLUTION INTRAVENOUS; SUBCUTANEOUS at 17:00

## 2019-01-06 RX ADMIN — Medication SCH MG: at 07:24

## 2019-01-06 RX ADMIN — ATORVASTATIN CALCIUM SCH MG: 10 TABLET, FILM COATED ORAL at 07:24

## 2019-01-06 RX ADMIN — OXYCODONE HYDROCHLORIDE AND ACETAMINOPHEN SCH MG: 500 TABLET ORAL at 07:24

## 2019-01-06 RX ADMIN — MENTHOL PRN EACH: 5 LOZENGE ORAL at 22:45

## 2019-01-06 RX ADMIN — INSULIN ASPART SCH UNIT: 100 INJECTION, SOLUTION INTRAVENOUS; SUBCUTANEOUS at 21:21

## 2019-01-06 RX ADMIN — HEPARIN SODIUM SCH UNIT: 5000 INJECTION, SOLUTION INTRAVENOUS; SUBCUTANEOUS at 20:35

## 2019-01-06 RX ADMIN — CEFAZOLIN SCH: 330 INJECTION, POWDER, FOR SOLUTION INTRAMUSCULAR; INTRAVENOUS at 16:53

## 2019-01-06 RX ADMIN — IPRATROPIUM BROMIDE AND ALBUTEROL SULFATE SCH ML: .5; 3 SOLUTION RESPIRATORY (INHALATION) at 12:16

## 2019-01-06 RX ADMIN — IPRATROPIUM BROMIDE AND ALBUTEROL SULFATE SCH: .5; 3 SOLUTION RESPIRATORY (INHALATION) at 01:31

## 2019-01-06 RX ADMIN — SERTRALINE HYDROCHLORIDE SCH MG: 100 TABLET ORAL at 07:24

## 2019-01-06 RX ADMIN — ASPIRIN 81 MG CHEWABLE TABLET SCH MG: 81 TABLET CHEWABLE at 07:24

## 2019-01-06 RX ADMIN — GUAIFENESIN PRN MG: 200 SOLUTION ORAL at 02:14

## 2019-01-06 NOTE — P.PN
Subjective


Progress Note Date: 01/06/19





This is a very pleasant 78-year-old female patient she is a fairly poor 

historian, who has history of atrial fibrillation, coronary artery disease, 

diabetes , gastroesophageal reflux disease, hyperlipidemia, hypertension, 

osteoarthritis, obstructive sleep apnea, chronic obstructive pulmonary disease 

on oxygen at 3 L at home.  Previous smoking history.  The patient recently had 

undergone a procedure for a nosebleed and a few days later developed increasing 

shortness of breath cough and congestion and was brought here to the emergency 

room yesterday for the same.  Initial Chest -ray revealed a patchy right 

perihilar infiltrate and cardiomegaly.  Repeat chest x-ray performed today 

showed cardiomegaly with prominence of the pulmonary arteries suggesting some 

pulmonary artery hypertension.  Cannot exclude bilateral effusions.  White 

blood cell count 13.1, hemoglobin 11.2, platelet count 304.  Sodium 144, 

potassium 3.5, BUN 39, creatinine 0.8.  Magnesium 1.4, alk phos 132, AST 45, 

ALT 47.  Initial troponin 0.067.  BNP level 13,600.  Influenza A and B are 

negative.  Patient was initiated on IV antibiotics for pneumonia, she has also 

been started on IV Lasix.  According to the patient, today she started putting 

out a significant amount of urine.  He is to cough up dark tan sputum.  Blood 

pressure this morning 122/60 with a heart rate in the 60s, 96% on 4 L.








01/06/2019


Patient seen and examined this morning, sitting up in the chair at bedside, 

sinus also present.  Diuresing on Lasix, weight is down 1 kg today.  Continues 

to cough up a significant amount of tan sputum.  Continues to have persistent 

cough.  Blood pressure 136/70 with a heart rate in the 70s, 94% on 3 L.  White 

blood cell count 8.5, hemoglobin 11.5, platelet count 287.  Sodium 139, 

potassium 4.4, BUN 45, creatinine 1.0.





Objective





- Vital Signs


Vital signs: 


 Vital Signs











Temp  98.2 F   01/06/19 11:32


 


Pulse  78   01/06/19 12:26


 


Resp  18   01/06/19 12:00


 


BP  137/75   01/06/19 11:32


 


Pulse Ox  94 L  01/06/19 11:32








 Intake & Output











 01/05/19 01/06/19 01/06/19





 18:59 06:59 18:59


 


Intake Total 480 550 240


 


Output Total 600  400


 


Balance -120 550 -160


 


Weight  95.4 kg 


 


Intake:   


 


  Oral 480 550 240


 


Output:   


 


  Urine 600  400


 


Other:   


 


  Voiding Method Diaper Diaper Diaper





 Incontinent Incontinent Incontinent


 


  # Voids  1 3














- Exam





PHYSICAL EXAMINATION: 





GENERAL: 78-year-old  female in no acute distress at the time of my 

examination





HEENT: Head is atraumatic, normocephalic.  Pupils equal, round.  Sclera 

anicteric. Conjunctiva are clear.  Mucous membranes of the mouth are moist.  

Neck is supple.  There is  elevated jugular venous pressure.  No carotid  bruit 

is heard.





HEART EXAMINATION: Heart S1, S2 normal.  No murmur or gallop heard.





CHEST EXAMINATION: Lungs reveal scattered coarse rhonchi and wheezing throughout

, diminished air entry to the bases.





ABDOMEN:  Soft, nontender. Bowel sounds are heard. No organomegaly noted.


 


EXTREMITIES: 2+ peripheral pulses with  evidence of peripheral edema and no 

calf tenderness noted.





NEUROLOGIC patient is awake, alert and oriented 3 .


 


.





- Labs


CBC & Chem 7: 


 01/06/19 07:01





 01/06/19 07:01


Labs: 


 Abnormal Lab Results - Last 24 Hours (Table)











  01/05/19 01/05/19 01/05/19 Range/Units





  20:14 20:33 23:05 


 


MCHC     (31.0-37.0)  g/dL


 


Neutrophils #     (1.3-7.7)  k/uL


 


Lymphocytes #     (1.0-4.8)  k/uL


 


BUN     (7-17)  mg/dL


 


Creatinine     (0.52-1.04)  mg/dL


 


Glucose     (74-99)  mg/dL


 


POC Glucose (mg/dL)  59 L  67 L   (75-99)  mg/dL


 


Urine Appearance    Cloudy H  (Clear)  


 


Urine Protein    Trace H  (Negative)  


 


Ur Leukocyte Esterase    Moderate H  (Negative)  


 


Urine Bacteria    Occasional H  (None)  /hpf


 


Hyaline Casts    55 H  (0-2)  /lpf


 


Urine Mucus    Rare H  (None)  /hpf














  01/06/19 01/06/19 01/06/19 Range/Units





  05:51 07:01 07:01 


 


MCHC   29.6 L   (31.0-37.0)  g/dL


 


Neutrophils #   8.0 H   (1.3-7.7)  k/uL


 


Lymphocytes #   0.3 L   (1.0-4.8)  k/uL


 


BUN    45 H  (7-17)  mg/dL


 


Creatinine    1.05 H  (0.52-1.04)  mg/dL


 


Glucose    163 H  (74-99)  mg/dL


 


POC Glucose (mg/dL)  178 H    (75-99)  mg/dL


 


Urine Appearance     (Clear)  


 


Urine Protein     (Negative)  


 


Ur Leukocyte Esterase     (Negative)  


 


Urine Bacteria     (None)  /hpf


 


Hyaline Casts     (0-2)  /lpf


 


Urine Mucus     (None)  /hpf














  01/06/19 Range/Units





  11:07 


 


MCHC   (31.0-37.0)  g/dL


 


Neutrophils #   (1.3-7.7)  k/uL


 


Lymphocytes #   (1.0-4.8)  k/uL


 


BUN   (7-17)  mg/dL


 


Creatinine   (0.52-1.04)  mg/dL


 


Glucose   (74-99)  mg/dL


 


POC Glucose (mg/dL)  237 H  (75-99)  mg/dL


 


Urine Appearance   (Clear)  


 


Urine Protein   (Negative)  


 


Ur Leukocyte Esterase   (Negative)  


 


Urine Bacteria   (None)  /hpf


 


Hyaline Casts   (0-2)  /lpf


 


Urine Mucus   (None)  /hpf








 Microbiology - Last 24 Hours (Table)











 01/05/19 23:05 Urine Culture - Preliminary





 Urine,Ureter 


 


 01/04/19 15:00 Blood Culture - Preliminary





 Blood    No Growth after 24 hours














Assessment and Plan


Plan: 





Assessment and plan





#1 Acute hypoxic respiratory failure secondary to a right perihilar infiltrate 

suspect aspiration pneumonia.





#2 Recent procedure for epistaxis.





#3 History of atrial fibrillation, , paroxysmal, currently sinus rhythm.





#4 Coronary artery disease.  Follows with a cardiologist out of town





#5 Diabetes mellitus.





#6 Gastroesophageal reflux disease.





#7 Hypertension.





#8 Hyperlipidemia.





#9 Osteoarthritis.





#10 Former smoker.





#11 diastolic congestive heart failure acute on chronic.








Plan





We will continue current dose of IV Lasix, continue to monitor intake and 

output along with daily weights and daily lytes BUN and creatinine.  We will 

review the patient's echocardiogram with Doppler study.  Discontinue oral 

Cardizem.


DNP note has been reviewed, I agree with a documented findings and plan of 

care.  Patient was seen and examined.

## 2019-01-06 NOTE — ECHOF
Referral Reason:chf



MEASUREMENTS

--------

HEIGHT: 162.6 cm

WEIGHT: 95.7 kg

BP: 122/69

RVIDd:   3.7 cm     (< 3.3)

IVSd:   1.7 cm     (0.6 - 1.1)

LVIDd:   4.7 cm     (3.9 - 5.3)

LVPWd:   1.7 cm     (0.6 - 1.1)

IVSs:   1.8 cm

LVIDs:   3.7 cm

LVPWs:   1.7 cm

LA Diam:   5.1 cm     (2.7 - 3.8)

LAESV Index (A-L):   32.94 ml/m

Ao Diam:   3.1 cm     (2.0 - 3.7)

AV Cusp:   2.0 cm     (1.5 - 2.6)

MV EXCURSION:   12.169 mm     (> 18.000)

MV EF SLOPE:   35 mm/s     (70 - 150)

EPSS:   1.5 cm

MV E Dada:   1.65 m/s

MV DecT:   247 ms

MV A Dada:   1.13 m/s

MV E/A Ratio:   1.46 

AV maxP.12 mmHg

AV meanP.21 mmHg

RAP:   15.00 mmHg

RVSP:   83.81 mmHg







FINDINGS

--------

Sinus rhythm with extra systolic beats.

This was a technically adequate study.

The left ventricular size is normal.   There is severe concentric left ventricular hypertrophy.   Ove
rall left ventricular systolic function is low-normal with, an EF between 50 - 55 %.

The right ventricle is mildly enlarged.

LA is midly dilated 29-33ml/m2.

The right atrium is normal in size.

There is moderate aortic valve sclerosis.   There is mild aortic stenosis present.   Peak/mean gradie
nt across the Aortic Valve is 39.12mmHg / 14.21mmHg.

The mitral valve leaflets are mildly thickened.   Mild mitral annular calcification present.   Modera
te-to-severe mitral regurgitation is present.    The peak and mean MV gradients are 15.06mmHg 5.42mmH
g as measured by doppler.   Moderate mitral stenosis.posterior leaflet motion is restricted.

Severe tricuspid regurgitation present.   There is severe pulmonary hypertension.   The right ventric
ular systolic pressure, as measured by Doppler, is 83.81mmHg.

Trace/mild (physiologic)  pulmonic regurgitation.

The aortic root size is normal.

The inferior vena cava is dilated with no significant inspiratory collapse which is consistent estima
bharati right atrial pressure of >15 mmHg.

There is no pericardial effusion.



CONCLUSIONS

--------

1. Sinus rhythm with extra systolic beats.

2. This was a technically adequate study.

3. The left ventricular size is normal.

4. There is severe concentric left ventricular hypertrophy.

5. Overall left ventricular systolic function is low-normal with, an EF between 50 - 55 %.

6. The right ventricle is mildly enlarged.

7. LA is midly dilated 29-33ml/m2.

8. The right atrium is normal in size.

9. There is moderate aortic valve sclerosis.

10. There is mild aortic stenosis present.

11. Peak/mean gradient across the Aortic Valve is 39.12mmHg / 14.21mmHg.

12. The mitral valve leaflets are mildly thickened.

13. Mild mitral annular calcification present.

14. Moderate-to-severe mitral regurgitation is present.

15. The peak and mean MV gradients are 15.06mmHg 5.42mmHg as measured by doppler.

16. Moderate mitral stenosis.

17. Severe tricuspid regurgitation present.

18. There is severe pulmonary hypertension.

19. The right ventricular systolic pressure, as measured by Doppler, is 83.81mmHg.

20. Trace/mild (physiologic)  pulmonic regurgitation.

21. The aortic root size is normal.

22. The inferior vena cava is dilated with no significant inspiratory collapse which is consistent es
timated right atrial pressure of >15 mmHg.

23. There is no pericardial effusion.





SONOGRAPHER: Briana Finch RDCS

## 2019-01-06 NOTE — P.PN
Cardiology Progress Note  Abel Orellana MRN: [id number]  Age: 58 year old, : 1959  Date: 2018            Assessment and Plan:     Mr. Abel Orellana is a 59yo man w/ medical history significant for non-obstructive CAD, NICM LVEF 30% in  (NYHA IIIa Stage C), HTN, HLD, past substance abuse with meth and heroin, chronic alcoholism, chronic HCV and duodenal ulcers, admitted 2018 for worsening SOB and unstable angina, now with NSTEMI.    #CAD  #NSTEMI  Angiogram in  showed 10-20% occlusion of LAD and 20-25% mid RCA. Risk factors for CAD include HTN, HLD, smoking, and family history (mother  at 53 form heart disease).  - Heparin gtt  - Nitroglycerin gtt  - ASA 81mg  - Continue home statin  - start Toprol 25 mg daily  - Coronary angiogram today  - TTE today without wall motion abnormalities    #Acute on chronic heart failure  #Non-ischemic cardiomyopathy (LVEF 26%)  Has known non-ischemic cardiomyopathy. Echo today shows LVEF 26% (was 30% in ) with severely reduced global LV function. He has been non compliant with GDMT in the past. CXR shows cardiomegaly and pulmonary edema, and he has elevated JVP on physical exam. Current symptoms could be from worsening cardiomyopathy 2/2 alcohol and substance abuse. Drinks 3-5 mixed drinks per week, smokes 1 ppd, uses marijuana and meth. He also has risk factors for CAD and presents with anginal pain. Thus there may be ischemic causes.  - Strict I/Os  - Daily weight  - Low salt diet  - GDMT: Toprol 25 mg daily, start lisinopril 5 mg tomorrow  - diuresis: 40 mg IV furosemide daily, plan for this evening  - vasodilators/inotropes: not indicated    #HTN  - Hold lisinopril d/t dye load and diuresis today; resume tomorrow  - continue Toprol    #HLD  - Continue PTA statin    #Pulmonary nodule  #Precarinal node  CT scan from OSH revealed 1.3cm spiculated nodule within the lingula. Previous CT in 2018 showed nodule at the  Subjective


Progress Note Date: 01/06/19


Principal diagnosis: 





Acute hypoxic respiratory failure, suspect aspiration pneumonia.





This is a very pleasant 78-year-old female patient who follows with Dr. Augustin 

as her primary care physician.  She has history of atrial fibrillation, 

coronary artery disease, diabetes Stanislav, gastroesophageal reflux disease, 

hyperlipidemia, hypertension, osteoarthritis, obstructive sleep apnea, chronic 

obstructive pulmonary disease on oxygen at 3 L at home.  Previous smoking 

history.  The patient recently had undergone a procedure for a nosebleed and a 

few days later developed increasing shortness of breath cough and congestion 

and was brought here to the emergency room yesterday for the same.  As x-ray 

revealed a patchy right perihilar infiltrate and cardiomegaly.  He did have O2 

saturation of 81% on room air on presentation.  We're consulted for the same.  

She is seen today on the selective care unit.  She is awake and alert sitting 

up in a chair at the bedside.  She is somewhat of a poor historian.  She 

currently denies any worsening shortness of breath.  She has a loose 

nonproductive cough.  Taking O2 saturations in the upper 90s on 4 L/m per nasal 

cannula.  She's been afebrile.  Hemodynamically stable.  Sputum cultures 

pending.  White count 13.1.  Hemoglobin 11.2.  Creatinine 0.88.  Influenza 

screen negative.





Patient was reevaluated today on 1/6/2019, feeling much better, breathing a lot 

easier.  Minimal cough, the cough is productive with yellow phlegm, denies any 

fever no chills no hemoptysis no chest pain.  Denies any wheezing.  Patient 

remains on 4 L nasal cannula, and her O2 saturation is in the 90s.  CBC was 

noted to be normal.  Electrolytes are normal BUN is 45 creatinine is 1.05.











Objective





- Vital Signs


Vital signs: 


 Vital Signs











Temp  98.2 F   01/06/19 11:32


 


Pulse  78   01/06/19 12:26


 


Resp  18   01/06/19 12:00


 


BP  137/75   01/06/19 11:32


 


Pulse Ox  94 L  01/06/19 11:32








 Intake & Output











 01/05/19 01/06/19 01/06/19





 18:59 06:59 18:59


 


Intake Total 480 550 240


 


Output Total 600  400


 


Balance -120 550 -160


 


Weight  95.4 kg 


 


Intake:   


 


  Oral 480 550 240


 


Output:   


 


  Urine 600  400


 


Other:   


 


  Voiding Method Diaper Diaper Diaper





 Incontinent Incontinent Incontinent


 


  # Voids  1 3














- Exam





Physical Exam: Revealed a 78-year-old female in no distress.


Head: Atraumatic normocephalic.


HEENT:[Neck is supple.] [No neck masses.] [No thyromegaly.] [No JVD.]  PERRLA, 

EOMI, no icterus.


Chest: [Crackles and scattered rhonchi bilaterally.  No wheezing.  No chest 

wall tenderness.  Symmetrical chest expansion.


Cardiac Exam: [Normal S1 and S2, no S3 gallop, no murmur.]


Abdomen: [Soft, nontender,  no megaly, no rebound, no guarding, normal bowel 

sounds.]


Extremities: [No clubbing, no edema, no cyanosis.]


Neurological Exam: [No focal neurologic deficit.


Psychiatric: Normal mood affect and mental status examination.


Skin: No rashes]





- Labs


CBC & Chem 7: 


 01/06/19 07:01





 01/06/19 07:01


Labs: 


 Abnormal Lab Results - Last 24 Hours (Table)











  01/05/19 01/05/19 01/05/19 Range/Units





  20:14 20:33 23:05 


 


MCHC     (31.0-37.0)  g/dL


 


Neutrophils #     (1.3-7.7)  k/uL


 


Lymphocytes #     (1.0-4.8)  k/uL


 


BUN     (7-17)  mg/dL


 


Creatinine     (0.52-1.04)  mg/dL


 


Glucose     (74-99)  mg/dL


 


POC Glucose (mg/dL)  59 L  67 L   (75-99)  mg/dL


 


Urine Appearance    Cloudy H  (Clear)  


 


Urine Protein    Trace H  (Negative)  


 


Ur Leukocyte Esterase    Moderate H  (Negative)  


 


Urine Bacteria    Occasional H  (None)  /hpf


 


Hyaline Casts    55 H  (0-2)  /lpf


 


Urine Mucus    Rare H  (None)  /hpf














  01/06/19 01/06/19 01/06/19 Range/Units





  05:51 07:01 07:01 


 


MCHC   29.6 L   (31.0-37.0)  g/dL


 


Neutrophils #   8.0 H   (1.3-7.7)  k/uL


 


Lymphocytes #   0.3 L   (1.0-4.8)  k/uL


 


BUN    45 H  (7-17)  mg/dL


 


Creatinine    1.05 H  (0.52-1.04)  mg/dL


 


Glucose    163 H  (74-99)  mg/dL


 


POC Glucose (mg/dL)  178 H    (75-99)  mg/dL


 


Urine Appearance     (Clear)  


 


Urine Protein     (Negative)  


 


Ur Leukocyte Esterase     (Negative)  


 


Urine Bacteria     (None)  /hpf


 


Hyaline Casts     (0-2)  /lpf


 


Urine Mucus     (None)  /hpf














  01/06/19 Range/Units





  11:07 


 


MCHC   (31.0-37.0)  g/dL


 


Neutrophils #   (1.3-7.7)  k/uL


 


Lymphocytes #   (1.0-4.8)  k/uL


 


BUN   (7-17)  mg/dL


 


Creatinine   (0.52-1.04)  mg/dL


 


Glucose   (74-99)  mg/dL


 


POC Glucose (mg/dL)  237 H  (75-99)  mg/dL


 


Urine Appearance   (Clear)  


 


Urine Protein   (Negative)  


 


Ur Leukocyte Esterase   (Negative)  


 


Urine Bacteria   (None)  /hpf


 


Hyaline Casts   (0-2)  /lpf


 


Urine Mucus   (None)  /hpf








 Microbiology - Last 24 Hours (Table)











 01/04/19 20:32 Gram Stain - Preliminary





 Sputum Sputum Culture - Preliminary


 


 01/05/19 23:05 Urine Culture - Preliminary





 Urine,Ureter 


 


 01/04/19 15:00 Blood Culture - Preliminary





 Blood    No Growth after 24 hours














Assessment and Plan


Assessment: 





#1 Acute hypoxic respiratory failure secondary to a right perihilar infiltrate 

suspect aspiration pneumonia.





#2 Recent procedure for epistaxis.





#3 History of atrial fibrillation, currently sinus rhythm.





#4 Coronary artery disease.





#5 Diabetes mellitus.





#6 Gastroesophageal reflux disease.





#7 Hypertension.





#8 Hyperlipidemia.





#9 Osteoarthritis.





#10 Former smoker.





#11 Anxiety/depression.





Recommendation: Continue antibiotics, continue bronchodilators, continue oxygen

, titrate FiO2 to keep saturation above 90%.  Cut down on diuretics since the 

patient is showing prerenal picture, follow-up chest x-ray in a.m.  We'll 

continue to follow.








Time with Patient: Less than 30 "lingula measuring 0.6cm.  Precarinal node detected in January 2018. Unclear if these represent inflammation, but due to doubling in size of lingular node in the setting of active chronic smoking patient will need PET CT.   - PET CT as outpatient    #Chronic active HCV  #Fatty liver disease  #Chronic alcohol use  Patient has HCV with elevated titers. He also has chronic alcohol use and liver changes on biopsy from OSH that demonstrated fatty liver disease back in 2001. LFTs normal on admission as well as INR. Patient has high risk for cirrhosis and HCC. Abdominal US showed no cirrhosis or liver lesions.  - HIV and HCV tests pending  - Utox pending  - CIWA protocol    #Duodenal ulcer  EGD from Jan 2018 showed multiple bleeding ulcers in duodenum. No H. Pylori.  - Continue PTA PPI regimen      FEN: cardiac diet, NPO for cath  PPX: heparin gtt for DVT, PPI for GI  CODE: FULL     Patient was discussed with staff attending, Dr. Daily, who agrees with the above assessment and plan.      REINIER Salcedo  MS4, Cardiology    The excellent note was edited by me, Miguel A Garcia.             Subjective/Interval Events     Continues to have chest pain and be short of breath just sitting in bed. Pain is same in nature (pressure that radiates to left and back). Maybe some improvement since starting nitroglycerin. No N/V, chills/sweats.          Objective     BP (!) 88/66 (BP Location: Left arm)  Pulse 88  Temp 98.2  F (36.8  C) (Oral)  Resp 18  Ht 1.676 m (5' 6\")  Wt 68.8 kg (151 lb 9.6 oz)  SpO2 93%  BMI 24.47 kg/m2  Temp:  [97  F (36.1  C)-98.2  F (36.8  C)] 98.2  F (36.8  C)  Pulse:  [88] 88  Heart Rate:  [78-97] 79  Resp:  [16-20] 18  BP: ()/(64-94) 88/66  SpO2:  [91 %-99 %] 93 %  Wt Readings from Last 2 Encounters:   09/13/18 68.8 kg (151 lb 9.6 oz)     I/O last 3 completed shifts:  In: 554.66 [P.O.:360; I.V.:194.66]  Out: 1000 [Urine:1000]      Gen: No acute distress  HEENT: NC/AT, PERRL, EOM intact, MMM, OP without " exudates  PULM/THORAX: Bibasilar crackles, good air movement  CV: RRR, normal S1 and S2, no murmurs or rubs. JVD to mid neck sitting at 45 degrees  ABD: Soft, NTND, bowel sounds present, no masses  EXT: WWP. No LE edema, clubbing or cyanosis.  NEURO: CN II-XII grossly intact. A&Ox3    Lines: PIV    Drips: Heparin 1000U/hr, Nitroglycerin 0.6mcg/kg/min          Data:     Recent Results (from the past 24 hour(s))   CBC with platelets differential    Collection Time: 09/13/18  5:55 PM   Result Value Ref Range    WBC 8.6 4.0 - 11.0 10e9/L    RBC Count 4.59 4.4 - 5.9 10e12/L    Hemoglobin 13.7 13.3 - 17.7 g/dL    Hematocrit 42.3 40.0 - 53.0 %    MCV 92 78 - 100 fl    MCH 29.8 26.5 - 33.0 pg    MCHC 32.4 31.5 - 36.5 g/dL    RDW 14.0 10.0 - 15.0 %    Platelet Count 381 150 - 450 10e9/L    Diff Method Automated Method     % Neutrophils 53.1 %    % Lymphocytes 34.8 %    % Monocytes 9.6 %    % Eosinophils 2.0 %    % Basophils 0.4 %    % Immature Granulocytes 0.1 %    Nucleated RBCs 0 0 /100    Absolute Neutrophil 4.6 1.6 - 8.3 10e9/L    Absolute Lymphocytes 3.0 0.8 - 5.3 10e9/L    Absolute Monocytes 0.8 0.0 - 1.3 10e9/L    Absolute Eosinophils 0.2 0.0 - 0.7 10e9/L    Absolute Basophils 0.0 0.0 - 0.2 10e9/L    Abs Immature Granulocytes 0.0 0 - 0.4 10e9/L    Absolute Nucleated RBC 0.0     Platelet Estimate Confirming automated cell count    Comprehensive metabolic panel    Collection Time: 09/13/18  5:55 PM   Result Value Ref Range    Sodium 139 133 - 144 mmol/L    Potassium 3.9 3.4 - 5.3 mmol/L    Chloride 103 94 - 109 mmol/L    Carbon Dioxide 24 20 - 32 mmol/L    Anion Gap 12 3 - 14 mmol/L    Glucose 107 (H) 70 - 99 mg/dL    Urea Nitrogen 12 7 - 30 mg/dL    Creatinine 0.83 0.66 - 1.25 mg/dL    GFR Estimate >90 >60 mL/min/1.7m2    GFR Estimate If Black >90 >60 mL/min/1.7m2    Calcium 9.0 8.5 - 10.1 mg/dL    Bilirubin Total 0.6 0.2 - 1.3 mg/dL    Albumin 3.6 3.4 - 5.0 g/dL    Protein Total 8.4 6.8 - 8.8 g/dL    Alkaline  Phosphatase 88 40 - 150 U/L    ALT 40 0 - 70 U/L    AST 23 0 - 45 U/L   INR    Collection Time: 09/13/18  5:55 PM   Result Value Ref Range    INR 1.07 0.86 - 1.14   Magnesium    Collection Time: 09/13/18  5:55 PM   Result Value Ref Range    Magnesium 2.1 1.6 - 2.3 mg/dL   Troponin I    Collection Time: 09/13/18  5:55 PM   Result Value Ref Range    Troponin I ES <0.015 0.000 - 0.045 ug/L   Hemoglobin A1c    Collection Time: 09/13/18  5:55 PM   Result Value Ref Range    Hemoglobin A1C 6.2 (H) 0 - 5.6 %   EKG 12-lead, tracing only    Collection Time: 09/13/18  6:09 PM   Result Value Ref Range    Interpretation ECG Click View Image link to view waveform and result    UA with Microscopic reflex to Culture    Collection Time: 09/13/18  6:15 PM   Result Value Ref Range    Color Urine Yellow     Appearance Urine Clear     Glucose Urine Negative NEG^Negative mg/dL    Bilirubin Urine Negative NEG^Negative    Ketones Urine Negative NEG^Negative mg/dL    Specific Gravity Urine 1.041 (H) 1.003 - 1.035    Blood Urine Negative NEG^Negative    pH Urine 5.5 5.0 - 7.0 pH    Protein Albumin Urine Negative NEG^Negative mg/dL    Urobilinogen mg/dL Normal 0.0 - 2.0 mg/dL    Nitrite Urine Negative NEG^Negative    Leukocyte Esterase Urine Negative NEG^Negative    Source Catheterized Urine     WBC Urine <1 0 - 5 /HPF    RBC Urine <1 0 - 2 /HPF    Mucous Urine Present (A) NEG^Negative /LPF   EKG 12-lead, tracing only    Collection Time: 09/13/18  8:02 PM   Result Value Ref Range    Interpretation ECG Click View Image link to view waveform and result    Heparin 10a Level    Collection Time: 09/13/18  8:39 PM   Result Value Ref Range    Heparin 10A Level <0.10 IU/mL   EKG 12-lead, tracing only    Collection Time: 09/14/18  2:14 AM   Result Value Ref Range    Interpretation ECG Click View Image link to view waveform and result    Heparin 10a Level    Collection Time: 09/14/18  3:01 AM   Result Value Ref Range    Heparin 10A Level 0.10 IU/mL    CBC with platelets    Collection Time: 09/14/18  6:50 AM   Result Value Ref Range    WBC 10.9 4.0 - 11.0 10e9/L    RBC Count 4.56 4.4 - 5.9 10e12/L    Hemoglobin 13.5 13.3 - 17.7 g/dL    Hematocrit 41.4 40.0 - 53.0 %    MCV 91 78 - 100 fl    MCH 29.6 26.5 - 33.0 pg    MCHC 32.6 31.5 - 36.5 g/dL    RDW 13.9 10.0 - 15.0 %    Platelet Count 387 150 - 450 10e9/L   Basic metabolic panel    Collection Time: 09/14/18  6:50 AM   Result Value Ref Range    Sodium 134 133 - 144 mmol/L    Potassium 3.9 3.4 - 5.3 mmol/L    Chloride 100 94 - 109 mmol/L    Carbon Dioxide 26 20 - 32 mmol/L    Anion Gap 8 3 - 14 mmol/L    Glucose 117 (H) 70 - 99 mg/dL    Urea Nitrogen 15 7 - 30 mg/dL    Creatinine 0.86 0.66 - 1.25 mg/dL    GFR Estimate >90 >60 mL/min/1.7m2    GFR Estimate If Black >90 >60 mL/min/1.7m2    Calcium 9.1 8.5 - 10.1 mg/dL   Heparin Xa (10a) Level    Collection Time: 09/14/18  6:50 AM   Result Value Ref Range    Heparin 10A Level 0.49 IU/mL   EKG 12-lead, tracing only    Collection Time: 09/14/18  7:07 AM   Result Value Ref Range    Interpretation ECG Click View Image link to view waveform and result    Troponin I    Collection Time: 09/14/18  8:20 AM   Result Value Ref Range    Troponin I ES 0.527 (HH) 0.000 - 0.045 ug/L       Recent Results (from the past 24 hour(s))   XR Chest Port 1 View    Narrative    Exam: XR CHEST PORT 1 VW, 9/13/2018 6:26 PM    Indication: shortness of breath, CHF exacerbation, evaluate for  pulmonary edema or signs of lung congestion;     Comparison: None available    Findings:   Single portable view of the chest at 75 degrees. The trachea is  midline. The cardiomediastinal silhouette is enlarged. Diffuse mixed  interstitial and airspace opacities throughout both lungs. Pulmonary  vascular congestion. Small right pleural effusion. No pneumothorax.  The visualized upper abdomen is unremarkable. No acute osseous  abnormality.      Impression    Impression: Cardia mediastinal silhouette is  enlarged and there are  diffuse interstitial and airspace opacities, compatible with pulmonary  edema. Small right pleural effusion. Infectious etiologies are not  excluded.    I have personally reviewed the examination and initial interpretation  and I agree with the findings.    MEETA ASHER MD   US Abdomen Limited w Abd/Pelv Duplex Complete Port    Narrative    EXAMINATION: US ABDOMEN COMPLETE WITH DOPPLER, 9/13/2018 7:56 PM     COMPARISON: None.    HISTORY: Patient with chronic alcohol use and hepatitis C virus,  evaluate for cirrhosis, also portal and hepatic vein flows TECHNIQUE:  The abdomen was scanned in standard fashion with specialized  ultrasound transducer(s) using both gray-scale, color Doppler, and  spectral flow techniques.    Findings:    Liver: The liver demonstrates normal homogeneous echotexture. No  evidence of a focal hepatic mass.     Extrahepatic portal vein flow is antegrade, measuring 37 cm/sec.  Right portal vein flow is antegrade, measuring 19 cm/sec.  Left portal vein flow is antegrade, measuring 8 cm/sec.    Flow in the hepatic artery is towards the liver and:  89 cm/sec peak systolic  0.59 resistive index.     The splenic vein is patent and flow is towards the liver.  The left,  middle, and right hepatic veins are patent with flow towards the IVC.  The IVC is patent with flow towards the heart.   The visualized aorta  is not dilated.    Gallbladder: There is a focal area of wall thickening, with associated  ringdown artifact, consistent with adenomyomatosis. Remainder of the  gallbladder wall is within normal limits. There is no wall thickening,  pericholecystic fluid, positive sonographic Jiménez's sign or evidence  for cholelithiasis    Bile Ducts: Both the intra- and extrahepatic biliary system are of  normal caliber.  The common bile duct measures 5 mm in diameter.    Pancreas: Visualized portions of the head and body of the pancreas are  unremarkable.     Kidneys: The right  kidney demonstrates normal echotexture, measuring  11.3 cm in craniocaudal dimension. There is a simple anechoic cyst  within the midpole, measuring up to 1.6 cm.    Fluid: No evidence of ascites or pleural effusions.      Impression    Impression:   1.  Normal appearance of the liver; no sonographic evidence of  cirrhosis. No focal liver lesion.  2.  Patent and normal Doppler evaluation.  3.  Adenomyomatosis.  4.  Simple right renal cyst.    I have personally reviewed the examination and initial interpretation  and I agree with the findings.    MEETA ASHER MD     Complete Echo 9/14/18:  Moderately dilated (LIVDd 6.1 cm) LV with severely reduced global LV function,  LVEF=26%.  RV size and function are probably normal.  Moderate secondary/functional MR.  Previous study not available for comparison.        Medications     Current Facility-Administered Medications   Medication     acetaminophen (TYLENOL) Suppository 650 mg     acetaminophen (TYLENOL) tablet 650 mg     alum & mag hydroxide-simethicone (MYLANTA ES/MAALOX  ES) suspension 30 mL     aspirin chewable tablet 81 mg     atorvastatin (LIPITOR) tablet 80 mg     bisacodyl (DULCOLAX) EC tablet 5 mg    Or     bisacodyl (DULCOLAX) EC tablet 10 mg    Or     bisacodyl (DULCOLAX) EC tablet 15 mg     docusate sodium (COLACE) capsule 100 mg     folic acid (FOLVITE) tablet 1 mg     heparin  drip 25,000 units in 0.45% NaCl 250 mL (see additional administration details for dose)     heparin bolus from infusion pump     lidocaine (LMX4) cream     lidocaine 1 % 1 mL     LORazepam (ATIVAN) tablet 1-2 mg     medication instruction     metoprolol succinate (TOPROL-XL) 24 hr tablet 25 mg     multivitamin, therapeutic with minerals (THERA-VIT-M) tablet 1 tablet     nicotine (NICODERM CQ) 21 MG/24HR 24 hr patch 1 patch     nicotine Patch in Place     nicotine patch REMOVAL     nitroGLYcerin (NITROSTAT) sublingual tablet 0.4 mg     nitroGLYcerin 50 mg in D5W 250 mL (adult std)  infusion     pantoprazole (PROTONIX) EC tablet 40 mg     polyethylene glycol (MIRALAX/GLYCOLAX) Packet 17 g     sodium chloride (PF) 0.9% PF flush 20 mL     sodium chloride (PF) 0.9% PF flush 3 mL     sodium chloride (PF) 0.9% PF flush 3 mL     thiamine tablet 100 mg

## 2019-01-07 LAB
ANION GAP SERPL CALC-SCNC: 9 MMOL/L
BASOPHILS # BLD AUTO: 0 K/UL (ref 0–0.2)
BASOPHILS NFR BLD AUTO: 0 %
BUN SERPL-SCNC: 55 MG/DL (ref 7–17)
CALCIUM SPEC-MCNC: 9.5 MG/DL (ref 8.4–10.2)
CHLORIDE SERPL-SCNC: 99 MMOL/L (ref 98–107)
CO2 SERPL-SCNC: 30 MMOL/L (ref 22–30)
EOSINOPHIL # BLD AUTO: 0 K/UL (ref 0–0.7)
EOSINOPHIL NFR BLD AUTO: 0 %
ERYTHROCYTE [DISTWIDTH] IN BLOOD BY AUTOMATED COUNT: 3.91 M/UL (ref 3.8–5.4)
ERYTHROCYTE [DISTWIDTH] IN BLOOD: 14.8 % (ref 11.5–15.5)
GLUCOSE BLD-MCNC: 164 MG/DL (ref 75–99)
GLUCOSE BLD-MCNC: 199 MG/DL (ref 75–99)
GLUCOSE BLD-MCNC: 207 MG/DL (ref 75–99)
GLUCOSE BLD-MCNC: 247 MG/DL (ref 75–99)
GLUCOSE SERPL-MCNC: 156 MG/DL (ref 74–99)
HCT VFR BLD AUTO: 36.3 % (ref 34–46)
HGB BLD-MCNC: 11 GM/DL (ref 11.4–16)
LYMPHOCYTES # SPEC AUTO: 0.4 K/UL (ref 1–4.8)
LYMPHOCYTES NFR SPEC AUTO: 5 %
MCH RBC QN AUTO: 28.3 PG (ref 25–35)
MCHC RBC AUTO-ENTMCNC: 30.4 G/DL (ref 31–37)
MCV RBC AUTO: 92.9 FL (ref 80–100)
MONOCYTES # BLD AUTO: 0.1 K/UL (ref 0–1)
MONOCYTES NFR BLD AUTO: 1 %
NEUTROPHILS # BLD AUTO: 7.1 K/UL (ref 1.3–7.7)
NEUTROPHILS NFR BLD AUTO: 93 %
PLATELET # BLD AUTO: 280 K/UL (ref 150–450)
POTASSIUM SERPL-SCNC: 4.6 MMOL/L (ref 3.5–5.1)
SODIUM SERPL-SCNC: 138 MMOL/L (ref 137–145)
WBC # BLD AUTO: 7.7 K/UL (ref 3.8–10.6)

## 2019-01-07 RX ADMIN — IPRATROPIUM BROMIDE AND ALBUTEROL SULFATE SCH ML: .5; 3 SOLUTION RESPIRATORY (INHALATION) at 11:37

## 2019-01-07 RX ADMIN — INSULIN ASPART SCH UNIT: 100 INJECTION, SOLUTION INTRAVENOUS; SUBCUTANEOUS at 11:46

## 2019-01-07 RX ADMIN — METHYLPREDNISOLONE SODIUM SUCCINATE SCH MG: 40 INJECTION, POWDER, FOR SOLUTION INTRAMUSCULAR; INTRAVENOUS at 23:49

## 2019-01-07 RX ADMIN — METOPROLOL SUCCINATE SCH MG: 25 TABLET, EXTENDED RELEASE ORAL at 08:27

## 2019-01-07 RX ADMIN — Medication SCH MG: at 20:51

## 2019-01-07 RX ADMIN — IPRATROPIUM BROMIDE AND ALBUTEROL SULFATE SCH ML: .5; 3 SOLUTION RESPIRATORY (INHALATION) at 15:41

## 2019-01-07 RX ADMIN — CEFAZOLIN SCH: 330 INJECTION, POWDER, FOR SOLUTION INTRAMUSCULAR; INTRAVENOUS at 18:13

## 2019-01-07 RX ADMIN — INSULIN ASPART SCH UNIT: 100 INJECTION, SOLUTION INTRAVENOUS; SUBCUTANEOUS at 22:29

## 2019-01-07 RX ADMIN — HEPARIN SODIUM SCH UNIT: 5000 INJECTION, SOLUTION INTRAVENOUS; SUBCUTANEOUS at 20:49

## 2019-01-07 RX ADMIN — METHYLPREDNISOLONE SODIUM SUCCINATE SCH MG: 40 INJECTION, POWDER, FOR SOLUTION INTRAMUSCULAR; INTRAVENOUS at 00:02

## 2019-01-07 RX ADMIN — HYDROXYCHLOROQUINE SULFATE SCH MG: 200 TABLET, FILM COATED ORAL at 20:51

## 2019-01-07 RX ADMIN — IPRATROPIUM BROMIDE AND ALBUTEROL SULFATE SCH ML: .5; 3 SOLUTION RESPIRATORY (INHALATION) at 07:07

## 2019-01-07 RX ADMIN — ATORVASTATIN CALCIUM SCH MG: 10 TABLET, FILM COATED ORAL at 08:26

## 2019-01-07 RX ADMIN — INSULIN ASPART SCH UNIT: 100 INJECTION, SOLUTION INTRAVENOUS; SUBCUTANEOUS at 06:14

## 2019-01-07 RX ADMIN — MENTHOL PRN EACH: 5 LOZENGE ORAL at 02:05

## 2019-01-07 RX ADMIN — INSULIN ASPART SCH UNIT: 100 INJECTION, SOLUTION INTRAVENOUS; SUBCUTANEOUS at 17:49

## 2019-01-07 RX ADMIN — SERTRALINE HYDROCHLORIDE SCH MG: 100 TABLET ORAL at 08:27

## 2019-01-07 RX ADMIN — Medication SCH UNIT: at 08:26

## 2019-01-07 RX ADMIN — AZITHROMYCIN SCH MG: 500 TABLET, FILM COATED ORAL at 08:26

## 2019-01-07 RX ADMIN — IPRATROPIUM BROMIDE AND ALBUTEROL SULFATE SCH ML: .5; 3 SOLUTION RESPIRATORY (INHALATION) at 00:05

## 2019-01-07 RX ADMIN — FUROSEMIDE SCH MG: 10 INJECTION, SOLUTION INTRAMUSCULAR; INTRAVENOUS at 08:26

## 2019-01-07 RX ADMIN — PANTOPRAZOLE SODIUM SCH MG: 40 TABLET, DELAYED RELEASE ORAL at 06:14

## 2019-01-07 RX ADMIN — HEPARIN SODIUM SCH UNIT: 5000 INJECTION, SOLUTION INTRAVENOUS; SUBCUTANEOUS at 08:27

## 2019-01-07 RX ADMIN — ASPIRIN 81 MG CHEWABLE TABLET SCH MG: 81 TABLET CHEWABLE at 08:26

## 2019-01-07 RX ADMIN — METHYLPREDNISOLONE SODIUM SUCCINATE SCH MG: 40 INJECTION, POWDER, FOR SOLUTION INTRAMUSCULAR; INTRAVENOUS at 17:48

## 2019-01-07 RX ADMIN — ALLOPURINOL SCH MG: 300 TABLET ORAL at 08:26

## 2019-01-07 RX ADMIN — METHYLPREDNISOLONE SODIUM SUCCINATE SCH MG: 40 INJECTION, POWDER, FOR SOLUTION INTRAMUSCULAR; INTRAVENOUS at 08:26

## 2019-01-07 RX ADMIN — IPRATROPIUM BROMIDE AND ALBUTEROL SULFATE SCH ML: .5; 3 SOLUTION RESPIRATORY (INHALATION) at 03:57

## 2019-01-07 RX ADMIN — Medication SCH MG: at 08:27

## 2019-01-07 RX ADMIN — IPRATROPIUM BROMIDE AND ALBUTEROL SULFATE SCH ML: .5; 3 SOLUTION RESPIRATORY (INHALATION) at 19:31

## 2019-01-07 RX ADMIN — OXYCODONE HYDROCHLORIDE AND ACETAMINOPHEN SCH MG: 500 TABLET ORAL at 08:26

## 2019-01-07 RX ADMIN — INSULIN DETEMIR SCH UNIT: 100 INJECTION, SOLUTION SUBCUTANEOUS at 08:27

## 2019-01-07 RX ADMIN — HYDROXYCHLOROQUINE SULFATE SCH MG: 200 TABLET, FILM COATED ORAL at 08:27

## 2019-01-07 RX ADMIN — IPRATROPIUM BROMIDE AND ALBUTEROL SULFATE SCH ML: .5; 3 SOLUTION RESPIRATORY (INHALATION) at 23:59

## 2019-01-07 NOTE — P.PN
Subjective


Progress Note Date: 01/07/19





This is a very pleasant 78-year-old female patient she is a fairly poor 

historian, who has history of atrial fibrillation, coronary artery disease, 

diabetes , gastroesophageal reflux disease, hyperlipidemia, hypertension, 

osteoarthritis, obstructive sleep apnea, chronic obstructive pulmonary disease 

on oxygen at 3 L at home.  Previous smoking history.  The patient recently had 

undergone a procedure for a nosebleed and a few days later developed increasing 

shortness of breath cough and congestion and was brought here to the emergency 

room yesterday for the same.  Initial Chest -ray revealed a patchy right 

perihilar infiltrate and cardiomegaly.  Repeat chest x-ray performed today 

showed cardiomegaly with prominence of the pulmonary arteries suggesting some 

pulmonary artery hypertension.  Cannot exclude bilateral effusions.  White 

blood cell count 13.1, hemoglobin 11.2, platelet count 304.  Sodium 144, 

potassium 3.5, BUN 39, creatinine 0.8.  Magnesium 1.4, alk phos 132, AST 45, 

ALT 47.  Initial troponin 0.067.  BNP level 13,600.  Influenza A and B are 

negative.  Patient was initiated on IV antibiotics for pneumonia, she has also 

been started on IV Lasix.  According to the patient, today she started putting 

out a significant amount of urine.  He is to cough up dark tan sputum.  Blood 

pressure this morning 122/60 with a heart rate in the 60s, 96% on 4 L.








01/06/2019


Patient seen and examined this morning, sitting up in the chair at bedside, 

sinus also present.  Diuresing on Lasix, weight is down 1 kg today.  Continues 

to cough up a significant amount of tan sputum.  Continues to have persistent 

cough.  Blood pressure 136/70 with a heart rate in the 70s, 94% on 3 L.  White 

blood cell count 8.5, hemoglobin 11.5, platelet count 287.  Sodium 139, 

potassium 4.4, BUN 45, creatinine 1.0.








01/07/2019





Patient was seen and examined this morning, sitting up in her bed.  She does 

state that overall her breathing is improving.  She continues to have a cough 

however today it appears to be much less productive.  She does feel as though 

she's putting out a lot of urine however her weight is not in indicating a 

downward trend.  White blood cell count is normal, hemoglobin 11, platelet 

count 280.  Sodium 138, potassium 4.6, BUN 55 and creatinine 0.9.





Objective





- Vital Signs


Vital signs: 


 Vital Signs











Temp  98.0 F   01/07/19 08:00


 


Pulse  88   01/07/19 08:00


 


Resp  18   01/07/19 08:00


 


BP  120/70   01/07/19 08:00


 


Pulse Ox  92 L  01/07/19 08:00








 Intake & Output











 01/06/19 01/07/19 01/07/19





 18:59 06:59 18:59


 


Intake Total 240  360


 


Output Total 400 200 


 


Balance -160 -200 360


 


Weight  96 kg 


 


Intake:   


 


  Oral 240  360


 


Output:   


 


  Urine 400 200 


 


Other:   


 


  Voiding Method Diaper Diaper Diaper





 Incontinent Incontinent Incontinent


 


  # Voids 3 1 2














- Exam





PHYSICAL EXAMINATION: 





GENERAL: 78-year-old  female in no acute distress at the time of my 

examination





HEENT: Head is atraumatic, normocephalic.  Pupils equal, round.  Sclera 

anicteric. Conjunctiva are clear.  Mucous membranes of the mouth are moist.  

Neck is supple.  There is no  elevated jugular venous pressure.  No carotid  

bruit is heard.





HEART EXAMINATION: Heart S1, S2 normal.  No murmur or gallop heard.





CHEST EXAMINATION: Lungs reveal decreased air exchange, much less wheezing 

today.  





ABDOMEN:  Soft, nontender. Bowel sounds are heard. No organomegaly noted.


 


EXTREMITIES: 2+ peripheral pulses with no  evidence of peripheral edema and no 

calf tenderness noted.





NEUROLOGIC patient is awake, alert and oriented 3 .


 


.





- Labs


CBC & Chem 7: 


 01/07/19 06:04





 01/07/19 06:04


Labs: 


 Abnormal Lab Results - Last 24 Hours (Table)











  01/06/19 01/06/19 01/06/19 Range/Units





  11:07 16:21 20:52 


 


Hgb     (11.4-16.0)  gm/dL


 


MCHC     (31.0-37.0)  g/dL


 


Lymphocytes #     (1.0-4.8)  k/uL


 


BUN     (7-17)  mg/dL


 


Glucose     (74-99)  mg/dL


 


POC Glucose (mg/dL)  237 H  228 H  201 H  (75-99)  mg/dL














  01/07/19 01/07/19 01/07/19 Range/Units





  05:50 06:04 06:04 


 


Hgb   11.0 L   (11.4-16.0)  gm/dL


 


MCHC   30.4 L   (31.0-37.0)  g/dL


 


Lymphocytes #   0.4 L   (1.0-4.8)  k/uL


 


BUN    55 H  (7-17)  mg/dL


 


Glucose    156 H  (74-99)  mg/dL


 


POC Glucose (mg/dL)  164 H    (75-99)  mg/dL








 Microbiology - Last 24 Hours (Table)











 01/04/19 20:32 Gram Stain - Final





 Sputum Sputum Culture - Final


 


 01/04/19 15:00 Blood Culture - Preliminary





 Blood    No Growth after 48 hours


 


 01/05/19 23:05 Urine Culture - Preliminary





 Urine,Ureter 














Assessment and Plan


Plan: 





Assessment and plan





#1 Acute hypoxic respiratory failure secondary to a right perihilar infiltrate 

suspect aspiration pneumonia.





#2 Recent procedure for epistaxis.





#3 History of atrial fibrillation, , paroxysmal, currently sinus rhythm.





#4 Coronary artery disease.  Follows with a cardiologist out of town





#5 Diabetes mellitus.





#6 Gastroesophageal reflux disease.





#7 Hypertension.





#8 Hyperlipidemia.





#9 Osteoarthritis.





#10 Former smoker.





#11 diastolic congestive heart failure acute on chronic.








Plan





We will continue current dose of IV Lasix, continue to monitor intake and 

output along with daily weights and daily lytes BUN and creatinine.  

Echocardiogram with Doppler study was reviewed which revealed a normal left 

ventricular systolic function.  Moderate to severe mitral regurgitation with 

moderate mitral stenosis and severe tricuspid regurg.  Repeat chest x-ray.





DNP note has been reviewed, I agree with a documented findings and plan of 

care.  Patient was seen and examined.

## 2019-01-07 NOTE — PN
PROGRESS NOTE



DATE OF SERVICE:

01/07/2019



This 78-year-old woman who was admitted with COPD exacerbation with bilateral pneumonia

with possibly gram-negative is being closely monitored.  No chest pain.  No

palpitations.  No fever.



EXAM:

Alert and oriented times three. Pulse 72, blood pressure 101/52, respirations 16,

temperature 98.1, pulse ox 92% on 2 L.

HEENT: Conjunctivae normal.

NECK: No jugular venous distention.

CARDIOVASCULAR: S1, S2 muffled.

RESPIRATION: Breath sounds diminished in the bases.  Bilateral scattered rhonchi and

crackles.

ABDOMEN is soft, nontender.

LEGS are no edema. No swelling.

CENTRAL NERVOUS SYSTEM: No focal deficits.



LABS:

WBC 7.2, hemoglobin is 11.



ASSESSMENT:

1. Chronic obstructive pulmonary disease exacerbation with acute bilateral pneumonia

    possibly gram-negative with possibly aspiration.

2. Congestive heart failure acute exacerbation with acute on chronic diastolic

    dysfunction ejection fraction 55-60 percent on recent 2D echo.

3. Moderate mitral regurgitation with mild aortic stenosis.

4. Increased bilirubin.

5. Increased AST.

6. Troponin 0.06 indeterminate.

7. History of atrial fibrillation.

8. History of coronary artery disease.

9. Diabetes mellitus type 2.

10.Gastroesophageal reflux disease.

11.Hypertension.

12.Hyperlipidemia.

13.History of degenerative joint disease.

14.History of pneumonia.

15.History of recent epistaxis.

16.Anxiety/depression.



RECOMMENDATIONS AND DISCUSSION:

Recommend to continue current medications, continue with monitoring, management and

symptomatic treatment.  Otherwise, at this time, I recommend to monitor the patient

closely.  Continue with current medications.  Continue with antibiotics.  Monitor fluid

and electrolytes balance closely.  Closely follow with multiple consultants.  Further

recommendations to follow.  Possible discharge soon once the patient is stabilized.





MMODL / IJN: [id number] / Job#: [id number]

## 2019-01-07 NOTE — PN
PROGRESS NOTE



DATE OF SERVICE:

01/06/2019



This 78-year-old woman who was admitted with COPD acute exacerbation as well as

possibly aspiration pneumonia is being closely monitored at this time.  The patient had

CHF also. The patient on antibiotics. Multiple consultants are following the patient

closely.  A 2D echo with Doppler showed an ejection fraction 50% to 55% and moderate to

severe mitral regurgitation also.  No chest pain or palpitations.  No fever.



PHYSICAL EXAMINATION:

On exam, alert and oriented x3.  Pulse is 71, blood pressure 136/66, respirations 18,

temperature 98 degrees, pulse ox 96% on 2 L.

HEENT:  Conjunctivae normal.

NECK: No jugular venous distention.

CARDIOVASCULAR: S1, S2 muffled. Ejection systolic murmur.

RESPIRATORY: Breath sounds diminished at the bases. A few scattered rhonchi.  ABDOMEN:

Soft, nontender.

NERVOUS SYSTEM: No focal deficits.



LABS:

CBC within normal limits. Creatinine is 1.05.  Glucose noted.



ASSESSMENT:

1. Chronic obstructive pulmonary disease acute exacerbation with acute bilateral

    pneumonia possibly gram-negative, possibly aspiration.

2. Congestive heart failure acute exacerbation acute on chronic diastolic dysfunction,

    ejection fraction 50% to 60% on recent 2D echo.

3. Moderate mitral regurgitation with mild aortic stenosis.

4. Increased bilirubin.

5. Increased AST.

6. Troponin 0.06 indeterminate.

7. History of atrial fibrillation.

8. History of coronary artery disease.

9. Diabetes mellitus type 2.

10.Gastroesophageal reflux disease.

11.Hypertension.

12.Hyperlipidemia.

13.History of degenerative joint disease.

14.History of pneumonia.

15.History of recent epistaxis.

16.Anxiety, depression.



RECOMMENDATIONS AND DISCUSSION:

Recommend to continue current medications, continue with symptomatic treatment.

Continue to follow closely with Cardiology, bronchodilators, diuretics.  Prognosis

guarded because of multiple complex medical issues.  Further recommendation to follow.





MMODL / IJN: [id number] / Job#: [id number]

## 2019-01-07 NOTE — P.PN
Subjective


Progress Note Date: 01/07/19


Principal diagnosis: 


 Acute hypoxic respiratory failure, suspect aspiration pneumonia





This is a very pleasant 78-year-old female patient who follows with Dr. Augustin 

as her primary care physician.  She has history of atrial fibrillation, 

coronary artery disease, diabetes Stanislav, gastroesophageal reflux disease, 

hyperlipidemia, hypertension, osteoarthritis, obstructive sleep apnea, chronic 

obstructive pulmonary disease on oxygen at 3 L at home.  Previous smoking 

history.  The patient recently had undergone a procedure for a nosebleed and a 

few days later developed increasing shortness of breath cough and congestion 

and was brought here to the emergency room yesterday for the same.  As x-ray 

revealed a patchy right perihilar infiltrate and cardiomegaly.  He did have O2 

saturation of 81% on room air on presentation.  We're consulted for the same.  

She is seen today on the selective care unit.  She is awake and alert sitting 

up in a chair at the bedside.  She is somewhat of a poor historian.  She 

currently denies any worsening shortness of breath.  She has a loose 

nonproductive cough.  Taking O2 saturations in the upper 90s on 4 L/m per nasal 

cannula.  She's been afebrile.  Hemodynamically stable.  Sputum cultures 

pending.  White count 13.1.  Hemoglobin 11.2.  Creatinine 0.88.  Influenza 

screen negative.





Patient was reevaluated today on 1/6/2019, feeling much better, breathing a lot 

easier.  Minimal cough, the cough is productive with yellow phlegm, denies any 

fever no chills no hemoptysis no chest pain.  Denies any wheezing.  Patient 

remains on 4 L nasal cannula, and her O2 saturation is in the 90s.  CBC was 

noted to be normal.  Electrolytes are normal BUN is 45 creatinine is 1.05. 





On 01/07/2019 patient seen in follow-up on selective care unit.  She is on 2 L 

per nasal cannula her pulse ox is 100%, patient is afebrile.  Lung sounds are 

diminished, no wheezes, no rhonchi.  Urine culture was positive for group D 

enterococcus, sputum culture and blood culture showed no growth.  Current 

antibiotics include Zithromax, and Rocephin, patient is on IV Lasix at 40 mg 

daily,  repeat chest x-ray is pending.  Patient is on IV steroids, belies 

bronchodilators, she states she is breathing easier.  Today's labs have been 

reviewed, WBC is 7.7, hemoglobin is 11.0, elective was were within normal limits

, BUN is 55 and creatinine 0.94.








Objective





- Vital Signs


Vital signs: 


 Vital Signs











Temp  98.0 F   01/07/19 08:00


 


Pulse  82   01/07/19 12:00


 


Resp  18   01/07/19 12:00


 


BP  121/76   01/07/19 11:41


 


Pulse Ox  100   01/07/19 11:41








 Intake & Output











 01/06/19 01/07/19 01/07/19





 18:59 06:59 18:59


 


Intake Total 240  600


 


Output Total 400 200 


 


Balance -160 -200 600


 


Weight  96 kg 


 


Intake:   


 


  Oral 240  600


 


Output:   


 


  Urine 400 200 


 


Other:   


 


  Voiding Method Diaper Diaper Diaper





 Incontinent Incontinent Incontinent


 


  # Voids 3 1 2














- Exam


Physical Exam: Revealed a 78-year-old female in no distress.


Head: Atraumatic normocephalic.


HEENT:[Neck is supple.] [No neck masses.] [No thyromegaly.] [No JVD.]  PERRLA, 

EOMI, no icterus.


Chest: [Breath sounds bilaterally are diminished.  No wheezing.  No chest wall 

tenderness.  Symmetrical chest expansion.


Cardiac Exam: [Normal S1 and S2, no S3 gallop, no murmur.]


Abdomen: [Soft, nontender,  no megaly, no rebound, no guarding, normal bowel 

sounds.]


Extremities: [No clubbing, no edema, no cyanosis.]


Neurological Exam: [No focal neurologic deficit.


Psychiatric: Normal mood affect and mental status examination.








- Labs


CBC & Chem 7: 


 01/07/19 06:04





 01/07/19 06:04


Labs: 


 Abnormal Lab Results - Last 24 Hours (Table)











  01/06/19 01/06/19 01/07/19 Range/Units





  16:21 20:52 05:50 


 


Hgb     (11.4-16.0)  gm/dL


 


MCHC     (31.0-37.0)  g/dL


 


Lymphocytes #     (1.0-4.8)  k/uL


 


BUN     (7-17)  mg/dL


 


Glucose     (74-99)  mg/dL


 


POC Glucose (mg/dL)  228 H  201 H  164 H  (75-99)  mg/dL














  01/07/19 01/07/19 01/07/19 Range/Units





  06:04 06:04 11:13 


 


Hgb  11.0 L    (11.4-16.0)  gm/dL


 


MCHC  30.4 L    (31.0-37.0)  g/dL


 


Lymphocytes #  0.4 L    (1.0-4.8)  k/uL


 


BUN   55 H   (7-17)  mg/dL


 


Glucose   156 H   (74-99)  mg/dL


 


POC Glucose (mg/dL)    199 H  (75-99)  mg/dL








 Microbiology - Last 24 Hours (Table)











 01/05/19 23:05 Urine Culture - Preliminary





 Urine,Ureter    Group D Enterococcus


 


 01/04/19 20:32 Gram Stain - Final





 Sputum Sputum Culture - Final


 


 01/04/19 15:00 Blood Culture - Preliminary





 Blood    No Growth after 48 hours














Assessment and Plan


Plan: 


 Assessment:





#1 Acute hypoxic respiratory failure secondary to a right perihilar infiltrate 

suspect aspiration pneumonia.





#2 Recent procedure for epistaxis.





#3 History of atrial fibrillation, currently sinus rhythm.





#4 Coronary artery disease.





#5 Diabetes mellitus.





#6 Gastroesophageal reflux disease.





#7 Hypertension.





#8 Hyperlipidemia.





#9 Osteoarthritis.





#10 Former smoker.





#11 Anxiety/depression.





Plan:





Today's chest x-ray is pending, continue the IV steroids, nebulized 

bronchodilators, antibiotics.  Patient is breathing easier, feeling better, 

less congested.  No worsening dyspnea, no chest pain.  Still coughing up some 

colored phlegm.  Echocardiogram results have been noted.  Weaning the results 

of repeat chest x-ray today





I performed a history & physical examination of the patient and discussed their 

management with my nurse practitioner, Jeanie Soto.  I reviewed the nurse 

practitioner's note and agree with the documented findings and plan of care.  

Lung sounds are positive for diminished breath sounds.  The findings and the 

impression was discussed with the patient.  I attest to the documentation by 

the nurse practitioner. 





Time with Patient: Less than 30

## 2019-01-07 NOTE — XR
EXAMINATION TYPE: XR chest 2V

 

DATE OF EXAM: 1/7/2019

 

COMPARISON: Chest x-ray from 2 and 3 days ago.

 

HISTORY: CHF progress study.

 

TECHNIQUE:  Frontal and lateral views of the chest are obtained.

 

FINDINGS:  There is persisting cardiomegaly with bilateral hilar prominence. Reticular interstitial c
hanges bilaterally are redemonstrated. No large pleural effusion or pneumothorax is seen.  The osseou
s structures remain demineralized.

 

IMPRESSION:  Cardiomegaly with suspected chronic parenchymal changes. No suspicious new focal infiltr
ate. Underlying pulmonary artery hypertension suspected.

## 2019-01-08 LAB
ANION GAP SERPL CALC-SCNC: 7 MMOL/L
BASOPHILS # BLD AUTO: 0 K/UL (ref 0–0.2)
BASOPHILS NFR BLD AUTO: 0 %
BUN SERPL-SCNC: 58 MG/DL (ref 7–17)
CALCIUM SPEC-MCNC: 9.8 MG/DL (ref 8.4–10.2)
CHLORIDE SERPL-SCNC: 99 MMOL/L (ref 98–107)
CO2 SERPL-SCNC: 32 MMOL/L (ref 22–30)
EOSINOPHIL # BLD AUTO: 0 K/UL (ref 0–0.7)
EOSINOPHIL NFR BLD AUTO: 0 %
ERYTHROCYTE [DISTWIDTH] IN BLOOD BY AUTOMATED COUNT: 3.73 M/UL (ref 3.8–5.4)
ERYTHROCYTE [DISTWIDTH] IN BLOOD: 14.8 % (ref 11.5–15.5)
GLUCOSE BLD-MCNC: 177 MG/DL (ref 75–99)
GLUCOSE BLD-MCNC: 203 MG/DL (ref 75–99)
GLUCOSE BLD-MCNC: 223 MG/DL (ref 75–99)
GLUCOSE BLD-MCNC: 236 MG/DL (ref 75–99)
GLUCOSE SERPL-MCNC: 200 MG/DL (ref 74–99)
HCT VFR BLD AUTO: 34.7 % (ref 34–46)
HGB BLD-MCNC: 11.2 GM/DL (ref 11.4–16)
LYMPHOCYTES # SPEC AUTO: 0.3 K/UL (ref 1–4.8)
LYMPHOCYTES NFR SPEC AUTO: 4 %
MCH RBC QN AUTO: 30 PG (ref 25–35)
MCHC RBC AUTO-ENTMCNC: 32.2 G/DL (ref 31–37)
MCV RBC AUTO: 93.1 FL (ref 80–100)
MONOCYTES # BLD AUTO: 0.1 K/UL (ref 0–1)
MONOCYTES NFR BLD AUTO: 1 %
NEUTROPHILS # BLD AUTO: 7 K/UL (ref 1.3–7.7)
NEUTROPHILS NFR BLD AUTO: 94 %
PLATELET # BLD AUTO: 237 K/UL (ref 150–450)
POTASSIUM SERPL-SCNC: 4.4 MMOL/L (ref 3.5–5.1)
SODIUM SERPL-SCNC: 138 MMOL/L (ref 137–145)
WBC # BLD AUTO: 7.4 K/UL (ref 3.8–10.6)

## 2019-01-08 RX ADMIN — Medication SCH MG: at 20:21

## 2019-01-08 RX ADMIN — Medication SCH MG: at 08:08

## 2019-01-08 RX ADMIN — IPRATROPIUM BROMIDE AND ALBUTEROL SULFATE SCH ML: .5; 3 SOLUTION RESPIRATORY (INHALATION) at 12:08

## 2019-01-08 RX ADMIN — INSULIN ASPART SCH UNIT: 100 INJECTION, SOLUTION INTRAVENOUS; SUBCUTANEOUS at 11:53

## 2019-01-08 RX ADMIN — METHYLPREDNISOLONE SODIUM SUCCINATE SCH MG: 40 INJECTION, POWDER, FOR SOLUTION INTRAMUSCULAR; INTRAVENOUS at 08:06

## 2019-01-08 RX ADMIN — METOPROLOL SUCCINATE SCH MG: 25 TABLET, EXTENDED RELEASE ORAL at 08:08

## 2019-01-08 RX ADMIN — Medication SCH UNIT: at 08:07

## 2019-01-08 RX ADMIN — INSULIN ASPART SCH UNIT: 100 INJECTION, SOLUTION INTRAVENOUS; SUBCUTANEOUS at 17:02

## 2019-01-08 RX ADMIN — FUROSEMIDE SCH MG: 10 INJECTION, SOLUTION INTRAMUSCULAR; INTRAVENOUS at 08:07

## 2019-01-08 RX ADMIN — HEPARIN SODIUM SCH UNIT: 5000 INJECTION, SOLUTION INTRAVENOUS; SUBCUTANEOUS at 08:07

## 2019-01-08 RX ADMIN — CEFAZOLIN SCH MLS/HR: 330 INJECTION, POWDER, FOR SOLUTION INTRAMUSCULAR; INTRAVENOUS at 17:03

## 2019-01-08 RX ADMIN — HEPARIN SODIUM SCH UNIT: 5000 INJECTION, SOLUTION INTRAVENOUS; SUBCUTANEOUS at 20:21

## 2019-01-08 RX ADMIN — INSULIN ASPART SCH UNIT: 100 INJECTION, SOLUTION INTRAVENOUS; SUBCUTANEOUS at 21:10

## 2019-01-08 RX ADMIN — HYDROXYCHLOROQUINE SULFATE SCH MG: 200 TABLET, FILM COATED ORAL at 08:07

## 2019-01-08 RX ADMIN — INSULIN DETEMIR SCH UNIT: 100 INJECTION, SOLUTION SUBCUTANEOUS at 08:07

## 2019-01-08 RX ADMIN — AZITHROMYCIN SCH MG: 500 TABLET, FILM COATED ORAL at 08:07

## 2019-01-08 RX ADMIN — HYDROXYCHLOROQUINE SULFATE SCH MG: 200 TABLET, FILM COATED ORAL at 20:21

## 2019-01-08 RX ADMIN — SERTRALINE HYDROCHLORIDE SCH MG: 100 TABLET ORAL at 08:08

## 2019-01-08 RX ADMIN — IPRATROPIUM BROMIDE AND ALBUTEROL SULFATE SCH ML: .5; 3 SOLUTION RESPIRATORY (INHALATION) at 15:41

## 2019-01-08 RX ADMIN — ALLOPURINOL SCH MG: 300 TABLET ORAL at 08:06

## 2019-01-08 RX ADMIN — PANTOPRAZOLE SODIUM SCH MG: 40 TABLET, DELAYED RELEASE ORAL at 06:31

## 2019-01-08 RX ADMIN — INSULIN ASPART SCH UNIT: 100 INJECTION, SOLUTION INTRAVENOUS; SUBCUTANEOUS at 06:31

## 2019-01-08 RX ADMIN — IPRATROPIUM BROMIDE AND ALBUTEROL SULFATE SCH ML: .5; 3 SOLUTION RESPIRATORY (INHALATION) at 09:06

## 2019-01-08 RX ADMIN — IPRATROPIUM BROMIDE AND ALBUTEROL SULFATE SCH ML: .5; 3 SOLUTION RESPIRATORY (INHALATION) at 20:22

## 2019-01-08 RX ADMIN — ASPIRIN 81 MG CHEWABLE TABLET SCH MG: 81 TABLET CHEWABLE at 08:07

## 2019-01-08 RX ADMIN — OXYCODONE HYDROCHLORIDE AND ACETAMINOPHEN SCH MG: 500 TABLET ORAL at 08:07

## 2019-01-08 RX ADMIN — IPRATROPIUM BROMIDE AND ALBUTEROL SULFATE SCH ML: .5; 3 SOLUTION RESPIRATORY (INHALATION) at 03:50

## 2019-01-08 RX ADMIN — ATORVASTATIN CALCIUM SCH MG: 10 TABLET, FILM COATED ORAL at 08:07

## 2019-01-08 NOTE — P.PN
Subjective


Patient the appears to have come in with the symptoms of sepsis with mild low-

grade fever and leukocytosis appears to have perihilar pneumonia and patient is 

being treated her with ceftriaxone is from azithromycin.  Patient does not 

appear to have any UTI-like symptoms but urine analysis and urine culture was 

often urine culture is positive for Enterococcus faecalis but patient has 

ALLERGY to penicillins and vancomycin.  I'm unsure whether patient actually has 

a UTI are this is asymptomatic bacteriuria because of that reason I'll consult 

infectious disease.  Patient is also being treated for COPD with systemic 

steroids which will be switched to oral and patient is also being treated for 

chronic diastolic dysfunction with acute examination patient is presently on IV 

Lasix.  Patient is bit tired beyond which patient is otherwise clinically doing 

well is being evaluated for subacute rehabilitation placement





Constitutional:  denied any fever.


Cardio vascular: denied any chest pain, palpitations


Gastrointestinal denied any nausea vomiting


Pulmonary: Denied any shortness of breath cough


Neurologic denied any new focal deficits





All inpatient medications were reviewed and appropriate changes in these 

medications as dictated in the interval history and assessment and plan.








Objective





- Vital Signs


Vital signs: 


 Vital Signs











Temp  97.3 F L  01/08/19 15:49


 


Pulse  82   01/08/19 15:50


 


Resp  18   01/08/19 15:49


 


BP  135/69   01/08/19 15:49


 


Pulse Ox  99   01/08/19 15:49








 Intake & Output











 01/07/19 01/08/19 01/08/19





 18:59 06:59 18:59


 


Intake Total 840  100


 


Balance 840  100


 


Weight  96.4 kg 96.4 kg


 


Intake:   


 


  Intake, IV Titration   100





  Amount   


 


    Sodium Chloride 0.9% 1,   100





    000 ml @ 20 mls/hr IV .   





    Q24H [facility] Rx#:[id number]   


 


  Oral 840  


 


Other:   


 


  Voiding Method Diaper Diaper Diaper





 Incontinent Incontinent Incontinent


 


  # Voids 2 1 1


 


  # Bowel Movements   1














- Exam








PHYSICAL EXAMINATION: 





GENERAL: The patient is alert and oriented x3, not in any acute distress. Well 

developed, well nourished. 


HEENT: Pupils are round and equally reacting to light. EOMI. No scleral 

icterus. No conjunctival pallor. Normocephalic, atraumatic. No pharyngeal 

erythema. No thyromegaly. 


CARDIOVASCULAR: S1 and S2 present. No murmurs, rubs, or gallops.  History of 

mildly elevated JVD


PULMONARY: Chest is clear to auscultation, no wheezing or crackles. 


ABDOMEN: Soft, nontender, nondistended, normoactive bowel sounds. No palpable 

organomegaly. 


MUSCULOSKELETAL: No joint swelling or deformity.


EXTREMITIES: No cyanosis, clubbing, or pedal edema. 


NEUROLOGICAL: Gross neurological examination did not reveal any focal deficits. 


SKIN: No rashes. 

















- Labs


CBC & Chem 7: 


 01/08/19 06:10





 01/08/19 06:10


Labs: 


 Abnormal Lab Results - Last 24 Hours (Table)











  01/07/19 01/07/19 01/08/19 Range/Units





  16:30 21:26 06:10 


 


RBC    3.73 L  (3.80-5.40)  m/uL


 


Hgb    11.2 L  (11.4-16.0)  gm/dL


 


Lymphocytes #    0.3 L  (1.0-4.8)  k/uL


 


Carbon Dioxide     (22-30)  mmol/L


 


BUN     (7-17)  mg/dL


 


Glucose     (74-99)  mg/dL


 


POC Glucose (mg/dL)  247 H  207 H   (75-99)  mg/dL














  01/08/19 01/08/19 01/08/19 Range/Units





  06:10 06:21 11:34 


 


RBC     (3.80-5.40)  m/uL


 


Hgb     (11.4-16.0)  gm/dL


 


Lymphocytes #     (1.0-4.8)  k/uL


 


Carbon Dioxide  32 H    (22-30)  mmol/L


 


BUN  58 H    (7-17)  mg/dL


 


Glucose  200 H    (74-99)  mg/dL


 


POC Glucose (mg/dL)   223 H  203 H  (75-99)  mg/dL








 Microbiology - Last 24 Hours (Table)











 01/05/19 23:05 Urine Culture - Final





 Urine,Ureter    Enterococcus faecalis


 


 01/04/19 15:00 Blood Culture - Preliminary





 Blood    No Growth after 72 hours














Assessment and Plan


Plan: 


-Sepsis secondary to prerenal of pneumonia most probably my suspicion is low 

for urinary tract infection patient may have a symptomatic bacteriuria patient 

symptoms improved with Rocephin and azithromycin and patient is afebrile doesn'

t have any leukocytosis.


-COPD with acute exacerbation patient symptoms improved no wheezing today 

patient will be switched to oral steroids


-Congestive chronic diastolic dysfunction with acute exacerbation can you with 

IV Lasix and the patient probably can be discharged tomorrow motor Lasix.


-Moderate mitral regurgitation


-Elevated liver enzymes secondary to hepatic congestion which is improving


-Coronary artery disease


-Atrial fibrillation presently rate controlled


-Type 2 diabetes mellitus


-Hypertension


-Hyperlipidemia


-Generalized deconditioning.

## 2019-01-08 NOTE — P.PN
Subjective


Progress Note Date: 01/08/19


Principal diagnosis: 





Acute hypoxic respiratory failure secondary to suspected aspiration pneumonia.





This is a very pleasant 78-year-old female patient who follows with Dr. Augustin 

as her primary care physician.  She has history of atrial fibrillation, 

coronary artery disease, diabetes Stanislav, gastroesophageal reflux disease, 

hyperlipidemia, hypertension, osteoarthritis, obstructive sleep apnea, chronic 

obstructive pulmonary disease on oxygen at 3 L at home.  Previous smoking 

history.  The patient recently had undergone a procedure for a nosebleed and a 

few days later developed increasing shortness of breath cough and congestion 

and was brought here to the emergency room yesterday for the same.  As x-ray 

revealed a patchy right perihilar infiltrate and cardiomegaly.  He did have O2 

saturation of 81% on room air on presentation.  We're consulted for the same.  

She is seen today on the selective care unit.  She is awake and alert sitting 

up in a chair at the bedside.  She is somewhat of a poor historian.  She 

currently denies any worsening shortness of breath.  She has a loose 

nonproductive cough.  Taking O2 saturations in the upper 90s on 4 L/m per nasal 

cannula.  She's been afebrile.  Hemodynamically stable.  Sputum cultures 

pending.  White count 13.1.  Hemoglobin 11.2.  Creatinine 0.88.  Influenza 

screen negative.





Patient was reevaluated today on 1/6/2019, feeling much better, breathing a lot 

easier.  Minimal cough, the cough is productive with yellow phlegm, denies any 

fever no chills no hemoptysis no chest pain.  Denies any wheezing.  Patient 

remains on 4 L nasal cannula, and her O2 saturation is in the 90s.  CBC was 

noted to be normal.  Electrolytes are normal BUN is 45 creatinine is 1.05. 





On 01/07/2019 patient seen in follow-up on selective care unit.  She is on 2 L 

per nasal cannula her pulse ox is 100%, patient is afebrile.  Lung sounds are 

diminished, no wheezes, no rhonchi.  Urine culture was positive for group D 

enterococcus, sputum culture and blood culture showed no growth.  Current 

antibiotics include Zithromax, and Rocephin, patient is on IV Lasix at 40 mg 

daily,  repeat chest x-ray is pending.  Patient is on IV steroids, belies 

bronchodilators, she states she is breathing easier.  Today's labs have been 

reviewed, WBC is 7.7, hemoglobin is 11.0, elective was were within normal limits

, BUN is 55 and creatinine 0.94.





The patient is seen today 01/08/2018 in follow-up on the selective care unit.  

She is currently sitting up in bed.  She is awake and alert in no acute 

distress.  He continues with a loose nonproductive cough.  Currently afebrile.  

Maintaining O2 saturations in the upper 90s on 2 L/m per nasal cannula.  

Hemodynamically stable.  Urine was positive for Enterococcus faecalis.  Blood 

and sputum cultures negative.  White count 7.4.  Hemoglobin 11.2.  Creatinine 

0.90.





Objective





- Vital Signs


Vital signs: 


 Vital Signs











Temp  97.3 F L  01/08/19 15:49


 


Pulse  77   01/08/19 16:00


 


Resp  18   01/08/19 16:00


 


BP  135/69   01/08/19 15:49


 


Pulse Ox  99   01/08/19 15:49








 Intake & Output











 01/07/19 01/08/19 01/08/19





 18:59 06:59 18:59


 


Intake Total 840  340


 


Balance 840  340


 


Weight  96.4 kg 96.4 kg


 


Intake:   


 


  Intake, IV Titration   100





  Amount   


 


    Sodium Chloride 0.9% 1,   100





    000 ml @ 20 mls/hr IV .   





    Q24H [facility] Rx#:[id number]   


 


  Oral 840  240


 


Other:   


 


  Voiding Method Diaper Diaper Diaper





 Incontinent Incontinent Incontinent


 


  # Voids 2 1 1


 


  # Bowel Movements   1














- Exam





- Constitutional


General appearance: mild distress, morbidly obese





- EENT


Eyes: EOMI, PERRLA


ENT: hard of hearing


Ears: bilateral: normal





- Neck


Neck: normal ROM


Carotids: bilateral: upstroke normal


Thyroid: bilateral: normal size





- Respiratory


Respiratory: right: rhonchi





- Cardiovascular


Rhythm: regular


Heart sounds: normal: S1, S2





- Gastrointestinal


General gastrointestinal: normal bowel sounds





- Integumentary


Integumentary: normal turgor





- Neurologic


Neurologic: CNII-XII intact





- Musculoskeletal


Musculoskeletal: generalized weakness





- Psychiatric





Alert and oriented to person.  Poor historian.








- Labs


CBC & Chem 7: 


 01/08/19 06:10





 01/08/19 06:10


Labs: 


 Abnormal Lab Results - Last 24 Hours (Table)











  01/07/19 01/08/19 01/08/19 Range/Units





  21:26 06:10 06:10 


 


RBC   3.73 L   (3.80-5.40)  m/uL


 


Hgb   11.2 L   (11.4-16.0)  gm/dL


 


Lymphocytes #   0.3 L   (1.0-4.8)  k/uL


 


Carbon Dioxide    32 H  (22-30)  mmol/L


 


BUN    58 H  (7-17)  mg/dL


 


Glucose    200 H  (74-99)  mg/dL


 


POC Glucose (mg/dL)  207 H    (75-99)  mg/dL














  01/08/19 01/08/19 Range/Units





  06:21 11:34 


 


RBC    (3.80-5.40)  m/uL


 


Hgb    (11.4-16.0)  gm/dL


 


Lymphocytes #    (1.0-4.8)  k/uL


 


Carbon Dioxide    (22-30)  mmol/L


 


BUN    (7-17)  mg/dL


 


Glucose    (74-99)  mg/dL


 


POC Glucose (mg/dL)  223 H  203 H  (75-99)  mg/dL








 Microbiology - Last 24 Hours (Table)











 01/05/19 23:05 Urine Culture - Final





 Urine,Ureter    Enterococcus faecalis


 


 01/04/19 15:00 Blood Culture - Preliminary





 Blood    No Growth after 72 hours














Assessment and Plan


Assessment: 





Impression:





#1 Acute hypoxic respiratory failure secondary to a right perihilar infiltrate 

suspect aspiration pneumonia.  Chest x-ray improved.





#2 Recent procedure for epistaxis.





#3 History of atrial fibrillation, currently sinus rhythm.





#4 Coronary artery disease.





#5 Diabetes mellitus.





#6 Gastroesophageal reflux disease.





#7 Hypertension.





#8 Hyperlipidemia.





#9 Osteoarthritis.





#10 Former smoker.





#11 Anxiety/depression.





Plan:





The patient was seen and evaluated by Dr. Watts.  Chest x-ray and labs were 

reviewed. We will continue with antibiotics in the form of azithromycin.  

Continue Bronchodilators.  Continue diuretics.  Titrate down the FiO2 as 

tolerated.  Heparin for DVT prophylaxis.  We'll continue to follow and make 

further recommendations based on her clinical status. 





I, the cosigning physician, performed a history & physical examination of the 

patient. Lungs sounds crackles in the posterior bases.  Maintaining good O2 

saturations in the 90s on 2 L/m per nasal cannula.  I discussed the assessment 

and plan of care with my nurse practitioner, Etta Adames. I attest to the above 

note as dictated by her.

## 2019-01-08 NOTE — P.PN
Subjective


Progress Note Date: 01/08/19





This is a very pleasant 78-year-old female patient she is a fairly poor 

historian, who has history of atrial fibrillation, coronary artery disease, 

diabetes , gastroesophageal reflux disease, hyperlipidemia, hypertension, 

osteoarthritis, obstructive sleep apnea, chronic obstructive pulmonary disease 

on oxygen at 3 L at home.  Previous smoking history.  The patient recently had 

undergone a procedure for a nosebleed and a few days later developed increasing 

shortness of breath cough and congestion and was brought here to the emergency 

room yesterday for the same.  Initial Chest -ray revealed a patchy right 

perihilar infiltrate and cardiomegaly.  Repeat chest x-ray performed today 

showed cardiomegaly with prominence of the pulmonary arteries suggesting some 

pulmonary artery hypertension.  Cannot exclude bilateral effusions.  White 

blood cell count 13.1, hemoglobin 11.2, platelet count 304.  Sodium 144, 

potassium 3.5, BUN 39, creatinine 0.8.  Magnesium 1.4, alk phos 132, AST 45, 

ALT 47.  Initial troponin 0.067.  BNP level 13,600.  Influenza A and B are 

negative.  Patient was initiated on IV antibiotics for pneumonia, she has also 

been started on IV Lasix.  According to the patient, today she started putting 

out a significant amount of urine.  He is to cough up dark tan sputum.  Blood 

pressure this morning 122/60 with a heart rate in the 60s, 96% on 4 L.








01/06/2019


Patient seen and examined this morning, sitting up in the chair at bedside, 

sinus also present.  Diuresing on Lasix, weight is down 1 kg today.  Continues 

to cough up a significant amount of tan sputum.  Continues to have persistent 

cough.  Blood pressure 136/70 with a heart rate in the 70s, 94% on 3 L.  White 

blood cell count 8.5, hemoglobin 11.5, platelet count 287.  Sodium 139, 

potassium 4.4, BUN 45, creatinine 1.0.








01/07/2019





Patient was seen and examined this morning, sitting up in her bed.  She does 

state that overall her breathing is improving.  She continues to have a cough 

however today it appears to be much less productive.  She does feel as though 

she's putting out a lot of urine however her weight is not in indicating a 

downward trend.  White blood cell count is normal, hemoglobin 11, platelet 

count 280.  Sodium 138, potassium 4.6, BUN 55 and creatinine 0.9.








01/08/2019


Patient was seen and examined this morning, states that her breathing is 

significantly improved, continues to have cough, not much sputum production 

today.  Continues to be on IV Lasix.  White blood cell count 7.4, hemoglobin 

11.2, platelet count 237.  Sodium 138, potassium 4.4, BUN 58, creatinine 0.9.





Objective





- Vital Signs


Vital signs: 


 Vital Signs











Temp  97.5 F L  01/08/19 07:53


 


Pulse  77   01/08/19 11:47


 


Resp  18   01/08/19 11:47


 


BP  134/69   01/08/19 11:47


 


Pulse Ox  93 L  01/08/19 11:47








 Intake & Output











 01/07/19 01/08/19 01/08/19





 18:59 06:59 18:59


 


Intake Total 840  20


 


Balance 840  20


 


Weight  96.4 kg 96.4 kg


 


Intake:   


 


  Intake, IV Titration   20





  Amount   


 


    Sodium Chloride 0.9% 1,   20





    000 ml @ 20 mls/hr IV .   





    Q24H [facility] Rx#:[id number]   


 


  Oral 840  


 


Other:   


 


  Voiding Method Diaper Diaper Diaper





 Incontinent Incontinent Incontinent


 


  # Voids 2 1 1














- Exam





PHYSICAL EXAMINATION: 





GENERAL: 78-year-old  female in no acute distress at the time of my 

examination





HEENT: Head is atraumatic, normocephalic.  Pupils equal, round.  Sclera 

anicteric. Conjunctiva are clear.  Mucous membranes of the mouth are moist.  

Neck is supple.  There is no  elevated jugular venous pressure.  No carotid  

bruit is heard.





HEART EXAMINATION: Heart S1, S2 normal.  No murmur or gallop heard.





CHEST EXAMINATION: Lungs reveal decreased air exchange, much less wheezing 

today.  





ABDOMEN:  Soft, nontender. Bowel sounds are heard. No organomegaly noted.


 


EXTREMITIES: 2+ peripheral pulses with no  evidence of peripheral edema and no 

calf tenderness noted.





NEUROLOGIC patient is awake, alert and oriented 3 .


 


.





- Labs


CBC & Chem 7: 


 01/08/19 06:10





 01/08/19 06:10


Labs: 


 Abnormal Lab Results - Last 24 Hours (Table)











  01/07/19 01/07/19 01/08/19 Range/Units





  16:30 21:26 06:10 


 


RBC    3.73 L  (3.80-5.40)  m/uL


 


Hgb    11.2 L  (11.4-16.0)  gm/dL


 


Lymphocytes #    0.3 L  (1.0-4.8)  k/uL


 


Carbon Dioxide     (22-30)  mmol/L


 


BUN     (7-17)  mg/dL


 


Glucose     (74-99)  mg/dL


 


POC Glucose (mg/dL)  247 H  207 H   (75-99)  mg/dL














  01/08/19 01/08/19 01/08/19 Range/Units





  06:10 06:21 11:34 


 


RBC     (3.80-5.40)  m/uL


 


Hgb     (11.4-16.0)  gm/dL


 


Lymphocytes #     (1.0-4.8)  k/uL


 


Carbon Dioxide  32 H    (22-30)  mmol/L


 


BUN  58 H    (7-17)  mg/dL


 


Glucose  200 H    (74-99)  mg/dL


 


POC Glucose (mg/dL)   223 H  203 H  (75-99)  mg/dL








 Microbiology - Last 24 Hours (Table)











 01/04/19 15:00 Blood Culture - Preliminary





 Blood    No Growth after 72 hours


 


 01/05/19 23:05 Urine Culture - Preliminary





 Urine,Ureter    Group D Enterococcus


 


 01/04/19 20:32 Gram Stain - Final





 Sputum Sputum Culture - Final














Assessment and Plan


Plan: 





Assessment and plan





#1 Acute hypoxic respiratory failure secondary to a right perihilar infiltrate 

suspect aspiration pneumonia.





#2 Recent procedure for epistaxis.





#3 History of atrial fibrillation, , paroxysmal, currently sinus rhythm.





#4 Coronary artery disease.  Follows with a cardiologist out of town





#5 Diabetes mellitus.





#6 Gastroesophageal reflux disease.





#7 Hypertension.





#8 Hyperlipidemia.





#9 Osteoarthritis.





#10 Former smoker.





#11 diastolic congestive heart failure acute on chronic.








Plan





We will continue current dose of IV Lasix, continue to monitor intake and 

output along with daily weights and daily lytes BUN and creatinine.  

Echocardiogram with Doppler study was reviewed which revealed a normal left 

ventricular systolic function.  Moderate to severe mitral regurgitation with 

moderate mitral stenosis and severe tricuspid regurg.  Repeat chest x-ray 

showed cardiomegaly with suspected chronic parenchymal changes.  No suspicious 

infiltrate.





DNP note has been reviewed, I agree with a documented findings and plan of 

care.  Patient was seen and examined.

## 2019-01-09 LAB
ANION GAP SERPL CALC-SCNC: 6 MMOL/L
BASOPHILS # BLD AUTO: 0 K/UL (ref 0–0.2)
BASOPHILS NFR BLD AUTO: 0 %
BUN SERPL-SCNC: 58 MG/DL (ref 7–17)
CALCIUM SPEC-MCNC: 10 MG/DL (ref 8.4–10.2)
CHLORIDE SERPL-SCNC: 98 MMOL/L (ref 98–107)
CO2 SERPL-SCNC: 36 MMOL/L (ref 22–30)
EOSINOPHIL # BLD AUTO: 0 K/UL (ref 0–0.7)
EOSINOPHIL NFR BLD AUTO: 0 %
ERYTHROCYTE [DISTWIDTH] IN BLOOD BY AUTOMATED COUNT: 3.92 M/UL (ref 3.8–5.4)
ERYTHROCYTE [DISTWIDTH] IN BLOOD: 14.7 % (ref 11.5–15.5)
GLUCOSE BLD-MCNC: 164 MG/DL (ref 75–99)
GLUCOSE BLD-MCNC: 193 MG/DL (ref 75–99)
GLUCOSE BLD-MCNC: 258 MG/DL (ref 75–99)
GLUCOSE BLD-MCNC: 98 MG/DL (ref 75–99)
GLUCOSE SERPL-MCNC: 88 MG/DL (ref 74–99)
HCT VFR BLD AUTO: 36.6 % (ref 34–46)
HGB BLD-MCNC: 11.6 GM/DL (ref 11.4–16)
LYMPHOCYTES # SPEC AUTO: 0.7 K/UL (ref 1–4.8)
LYMPHOCYTES NFR SPEC AUTO: 8 %
MCH RBC QN AUTO: 29.6 PG (ref 25–35)
MCHC RBC AUTO-ENTMCNC: 31.8 G/DL (ref 31–37)
MCV RBC AUTO: 93.4 FL (ref 80–100)
MONOCYTES # BLD AUTO: 0.4 K/UL (ref 0–1)
MONOCYTES NFR BLD AUTO: 4 %
NEUTROPHILS # BLD AUTO: 8.2 K/UL (ref 1.3–7.7)
NEUTROPHILS NFR BLD AUTO: 87 %
PLATELET # BLD AUTO: 263 K/UL (ref 150–450)
POTASSIUM SERPL-SCNC: 4.5 MMOL/L (ref 3.5–5.1)
SODIUM SERPL-SCNC: 140 MMOL/L (ref 137–145)
WBC # BLD AUTO: 9.4 K/UL (ref 3.8–10.6)

## 2019-01-09 RX ADMIN — IPRATROPIUM BROMIDE AND ALBUTEROL SULFATE SCH: .5; 3 SOLUTION RESPIRATORY (INHALATION) at 15:41

## 2019-01-09 RX ADMIN — Medication SCH UNIT: at 09:35

## 2019-01-09 RX ADMIN — IPRATROPIUM BROMIDE AND ALBUTEROL SULFATE SCH: .5; 3 SOLUTION RESPIRATORY (INHALATION) at 07:02

## 2019-01-09 RX ADMIN — INSULIN ASPART SCH UNIT: 100 INJECTION, SOLUTION INTRAVENOUS; SUBCUTANEOUS at 17:46

## 2019-01-09 RX ADMIN — INSULIN ASPART SCH: 100 INJECTION, SOLUTION INTRAVENOUS; SUBCUTANEOUS at 17:25

## 2019-01-09 RX ADMIN — METOPROLOL SUCCINATE SCH MG: 25 TABLET, EXTENDED RELEASE ORAL at 09:35

## 2019-01-09 RX ADMIN — AZITHROMYCIN SCH MG: 500 TABLET, FILM COATED ORAL at 09:34

## 2019-01-09 RX ADMIN — ALLOPURINOL SCH MG: 300 TABLET ORAL at 09:35

## 2019-01-09 RX ADMIN — IPRATROPIUM BROMIDE AND ALBUTEROL SULFATE SCH ML: .5; 3 SOLUTION RESPIRATORY (INHALATION) at 20:36

## 2019-01-09 RX ADMIN — FUROSEMIDE SCH MG: 10 INJECTION, SOLUTION INTRAMUSCULAR; INTRAVENOUS at 09:35

## 2019-01-09 RX ADMIN — INSULIN ASPART SCH: 100 INJECTION, SOLUTION INTRAVENOUS; SUBCUTANEOUS at 05:54

## 2019-01-09 RX ADMIN — Medication SCH MG: at 21:20

## 2019-01-09 RX ADMIN — SERTRALINE HYDROCHLORIDE SCH MG: 100 TABLET ORAL at 09:35

## 2019-01-09 RX ADMIN — HEPARIN SODIUM SCH UNIT: 5000 INJECTION, SOLUTION INTRAVENOUS; SUBCUTANEOUS at 09:35

## 2019-01-09 RX ADMIN — HEPARIN SODIUM SCH UNIT: 5000 INJECTION, SOLUTION INTRAVENOUS; SUBCUTANEOUS at 21:19

## 2019-01-09 RX ADMIN — IPRATROPIUM BROMIDE AND ALBUTEROL SULFATE SCH ML: .5; 3 SOLUTION RESPIRATORY (INHALATION) at 12:16

## 2019-01-09 RX ADMIN — INSULIN ASPART SCH UNIT: 100 INJECTION, SOLUTION INTRAVENOUS; SUBCUTANEOUS at 21:19

## 2019-01-09 RX ADMIN — ASPIRIN 81 MG CHEWABLE TABLET SCH MG: 81 TABLET CHEWABLE at 09:35

## 2019-01-09 RX ADMIN — HYDROXYCHLOROQUINE SULFATE SCH MG: 200 TABLET, FILM COATED ORAL at 09:41

## 2019-01-09 RX ADMIN — CEFAZOLIN SCH: 330 INJECTION, POWDER, FOR SOLUTION INTRAMUSCULAR; INTRAVENOUS at 20:06

## 2019-01-09 RX ADMIN — PANTOPRAZOLE SODIUM SCH MG: 40 TABLET, DELAYED RELEASE ORAL at 06:05

## 2019-01-09 RX ADMIN — IPRATROPIUM BROMIDE AND ALBUTEROL SULFATE SCH ML: .5; 3 SOLUTION RESPIRATORY (INHALATION) at 09:00

## 2019-01-09 RX ADMIN — IPRATROPIUM BROMIDE AND ALBUTEROL SULFATE SCH: .5; 3 SOLUTION RESPIRATORY (INHALATION) at 23:33

## 2019-01-09 RX ADMIN — HYDROXYCHLOROQUINE SULFATE SCH MG: 200 TABLET, FILM COATED ORAL at 21:19

## 2019-01-09 RX ADMIN — IPRATROPIUM BROMIDE AND ALBUTEROL SULFATE SCH: .5; 3 SOLUTION RESPIRATORY (INHALATION) at 05:05

## 2019-01-09 RX ADMIN — INSULIN DETEMIR SCH UNIT: 100 INJECTION, SOLUTION SUBCUTANEOUS at 09:41

## 2019-01-09 RX ADMIN — Medication SCH MG: at 09:35

## 2019-01-09 RX ADMIN — OXYCODONE HYDROCHLORIDE AND ACETAMINOPHEN SCH MG: 500 TABLET ORAL at 09:34

## 2019-01-09 NOTE — CONS
CONSULTATION



DATE OF SERVICE:

01/08/2019



REASON FOR CONSULTATION:

Urinary tract infection.



HISTORY OF PRESENT ILLNESS:

The patient is a 78-year-old  female presenting to the ER at [facility] on 01/04/2019 with chief complaints of increasing shortness of breath along

with cough, fever and chills.  Apparently symptoms have been going on for a few days

before she was in the hospital.  The patient did have a congested cough, bringing up

small amount of sputum.  No hemoptysis.  No chest pain.  No abdominal pain.  No

diarrhea.  The patient did have some urinary frequency but denies any incontinence of

stool _____ flank pain.  The patient has been evaluated.  The patient did have a chest

x-ray on admission, which shows patchy right perihilar infiltrate noted.  The patient

did have a repeat x-ray on the 7th, which did show chronic parenchymal changes with

suspicion of new focal infiltrate.  The patient did have a UA that was obtained on

admission which does show moderate leukocyte esterase with occasional bacteria. Only 1

WBC. Those urine cultures have now been finalized Enterococcus faecalis, sensitive to

ampicillin and Vanco.  However, THE PATIENT IS ALLERGIC TO BOTH TO PENICILLIN AND

VANCOMYCIN that prompted this infectious disease consultation.



REVIEW OF SYSTEMS:

Positive points have been mentioned in HPI:  Rest of the system has been negative.



PAST MEDICAL HISTORY:

Atrial fibrillation, coronary artery disease, diabetes mellitus, gastroesophageal

reflux disease,  hyperlipidemia, hypertension, osteoarthritis, pneumonia, sleep apnea.



PAST SURGICAL HISTORY:

Bilateral cataract surgery, bilateral knee replacement, tonsillectomy.



SOCIAL HISTORY:

Remote history of smoking.  No drinking or drug use.



FAMILY HISTORY:

Father with history of coronary artery disease.  Mother with history of dementia and

hypertension.



ALLERGIES:

IODINATED CONTRAST DYE,  PENICILLIN, VANCOMYCIN, but not specifically she did not

remember the type of reaction she has to penicillin or vancomycin.



MEDICATION:

Currently include the patient is on Tylenol, Norco, DuoNeb, Zyloprim, Xanax, vitamin C,

aspirin, Zithromax, Dulcolax, Lasix, Robitussin, heparin, Plaquenil, NovoLog, Levemir,

Mag oxide, melatonin, Toprol-XL.



PHYSICAL EXAMINATION:

Blood pressure is 109/64 with a pulse of 80, temperature is 97.5.  She is 95% on 3 L

nasal cannula. General description is an elderly female up in the chair in no distress.

No tachypnea or accessory muscles of respiration use.

HEENT:  Shows pallor.  No scleral icterus.  Oral mucosa membranes dry.  No pharyngeal

erythema or thrush.  Neck:  Trachea central.  No thyromegaly.  Lungs unlabored

breathing.  Coarse breath sounds at bases.  No wheeze.  Heart S1, S2.  Regular rate and

rhythm.

ABDOMEN:  Soft.  No tenderness.  No guarding or rigidity.

EXTREMITIES:  No edema of the feet.  Skin examination:  No rash or mass palpable.

NEUROLOGICAL:  Patient is awake, alert, oriented times three.  Mood and affect normal.



LABS:

Hemoglobin 11.2, white count 7.4, BUN of 15, creatinine 0.90. Electrolytes have been

normal.  Urine was mildly positive.  Influenza serology was negative.  Urine culture

with Enterococcus faecalis at _____colonies.



DIAGNOSTIC IMPRESSION AND PLAN:

1. Patient with positive urine cultures with enterococcus faecalis, which is a low

    colony count in this patient whose  UA was not significantly positive either, did

    show some leukocyte esterase, though no elevated white count, could be more likely

    colonization or contamination.  The patient did have some frequency of urine.  It

    could be related to the diuretics and no burning or suprapubic or flank pain.

2. The patient who does have MULTIPLE ANTIBIOTIC ALLERGIES that does limit the number

    of antibiotics that could be safely used.



PLAN:

1. We will repeat UA and culture.  RN has been advised to obtain a clean-catch sample.

2. We will hold on any further antibiotic therapy for the positive urine culture as

    clinical suspicion low for true UTI.

3. We will follow up on clinical condition and repeat UA to further _____ antibiotics

    if needed.

Thank you for this consultation.  We will follow this patient along with you.





MMODL / IJN: [id number] / Job#: [id number]

## 2019-01-09 NOTE — PN
PROGRESS NOTE



DATE OF SERVICE:

01/09/2019



REASON FOR FOLLOWUP:

Possible urinary tract infection.



INTERVAL HISTORY:

The patient is currently afebrile.  She is breathing more comfortably. Denies having

any chest pain.  She did have some cough.  No abdominal pain.  Some urinary frequency

but no burning.  No diarrhea.



PHYSICAL EXAMINATION:

Blood pressure 133/67, pulse of 74, temperature 96.9.  She is 98% on 2 L nasal cannula.

General description is an elderly female up in the chair in no distress.

RESPIRATORY SYSTEM: Unlabored breathing with decreased breath sounds at the base. No

wheeze.

HEART: S1, S2.  Regular rate and rhythm.

ABDOMEN: Soft. No tenderness.



LABS:

Hemoglobin 11.6, white count 9.4, BUN 58, creatinine 1.01.  _____ cultures are

unfortunately not done.



DIAGNOSTIC IMPRESSION AND PLAN:

Patient with urine culture positive for Enterococcus faecalis in this patient who does

have MULTIPLE ANTIBIOTIC ALLERGIES.  Her UA was not significantly positive.  Did have

some urinary frequency but no burning, with a question of possible contamination versus

colonization.  Repeat urine culture was ordered, unfortunately not completed. RN

instructed again to get the UA and culture.  Will keep the patient off antibiotic

therapy specifically for UTI and continue with supportive care.





MMODL / IJN: [id number] / Job#: [id number]

## 2019-01-09 NOTE — P.PN
Subjective


Progress Note Date: 01/09/19


Principal diagnosis: 





Acute hypoxic respiratory failure secondary to suspected aspiration pneumonia.





This is a very pleasant 78-year-old female patient who follows with Dr. Augustin 

as her primary care physician.  She has history of atrial fibrillation, 

coronary artery disease, diabetes Stanislav, gastroesophageal reflux disease, 

hyperlipidemia, hypertension, osteoarthritis, obstructive sleep apnea, chronic 

obstructive pulmonary disease on oxygen at 3 L at home.  Previous smoking 

history.  The patient recently had undergone a procedure for a nosebleed and a 

few days later developed increasing shortness of breath cough and congestion 

and was brought here to the emergency room yesterday for the same.  As x-ray 

revealed a patchy right perihilar infiltrate and cardiomegaly.  He did have O2 

saturation of 81% on room air on presentation.  We're consulted for the same.  

She is seen today on the selective care unit.  She is awake and alert sitting 

up in a chair at the bedside.  She is somewhat of a poor historian.  She 

currently denies any worsening shortness of breath.  She has a loose 

nonproductive cough.  Taking O2 saturations in the upper 90s on 4 L/m per nasal 

cannula.  She's been afebrile.  Hemodynamically stable.  Sputum cultures 

pending.  White count 13.1.  Hemoglobin 11.2.  Creatinine 0.88.  Influenza 

screen negative.





Patient was reevaluated today on 1/6/2019, feeling much better, breathing a lot 

easier.  Minimal cough, the cough is productive with yellow phlegm, denies any 

fever no chills no hemoptysis no chest pain.  Denies any wheezing.  Patient 

remains on 4 L nasal cannula, and her O2 saturation is in the 90s.  CBC was 

noted to be normal.  Electrolytes are normal BUN is 45 creatinine is 1.05. 





On 01/07/2019 patient seen in follow-up on selective care unit.  She is on 2 L 

per nasal cannula her pulse ox is 100%, patient is afebrile.  Lung sounds are 

diminished, no wheezes, no rhonchi.  Urine culture was positive for group D 

enterococcus, sputum culture and blood culture showed no growth.  Current 

antibiotics include Zithromax, and Rocephin, patient is on IV Lasix at 40 mg 

daily,  repeat chest x-ray is pending.  Patient is on IV steroids, belies 

bronchodilators, she states she is breathing easier.  Today's labs have been 

reviewed, WBC is 7.7, hemoglobin is 11.0, elective was were within normal limits

, BUN is 55 and creatinine 0.94.





The patient is seen today 01/08/2019 in follow-up on the selective care unit.  

She is currently sitting up in bed.  She is awake and alert in no acute 

distress.  He continues with a loose nonproductive cough.  Currently afebrile.  

Maintaining O2 saturations in the upper 90s on 2 L/m per nasal cannula.  

Hemodynamically stable.  Urine was positive for Enterococcus faecalis.  Blood 

and sputum cultures negative.  White count 7.4.  Hemoglobin 11.2.  Creatinine 

0.90.





The patient is seen today 01/09/2019 in follow-up on the selective care unit.  

She is currently sitting up in a chair at the bedside.  She is awake and alert 

in no acute distress.  She is maintaining O2 saturations in the mid 90s on 2 L/

m per nasal cannula.  She's been afebrile.  Hemodynamically stable.  White 

count 9.4.  Hemoglobin 11.6.  Creatinine 1.01.  She remains on IV diuretics.





Objective





- Vital Signs


Vital signs: 


 Vital Signs











Temp  97.7 F   01/09/19 12:00


 


Pulse  71   01/09/19 12:26


 


Resp  14   01/09/19 12:00


 


BP  131/77   01/09/19 12:00


 


Pulse Ox  97   01/09/19 12:00








 Intake & Output











 01/08/19 01/09/19 01/09/19





 18:59 06:59 18:59


 


Intake Total 580 20 


 


Balance 580 20 


 


Weight 96.4 kg 97.8 kg 


 


Intake:   


 


  IV  20 


 


    0.9  20 


 


  Intake, IV Titration 100  





  Amount   


 


    Sodium Chloride 0.9% 1, 100  





    000 ml @ 20 mls/hr IV .   





    Q24H [facility] Rx#:[id number]   


 


  Oral 480  


 


Other:   


 


  Voiding Method Diaper Diaper Diaper





 Incontinent Incontinent Incontinent


 


  # Voids 1 2 1


 


  # Bowel Movements 1 1 














- Exam





- Constitutional


General appearance: mild distress, morbidly obese, on nasal cannula.





- EENT


Eyes: EOMI, PERRLA


ENT: hard of hearing


Ears: bilateral: normal





- Neck


Neck: normal ROM


Carotids: bilateral: upstroke normal


Thyroid: bilateral: normal size





- Respiratory


Respiratory: right: rhonchi





- Cardiovascular


Rhythm: regular


Heart sounds: normal: S1, S2





- Gastrointestinal


General gastrointestinal: normal bowel sounds





- Integumentary


Integumentary: normal turgor





- Neurologic


Neurologic: CNII-XII intact





- Musculoskeletal


Musculoskeletal: generalized weakness





- Psychiatric





Alert and oriented to person.  Poor historian.








- Labs


CBC & Chem 7: 


 01/09/19 06:22





 01/09/19 06:22


Labs: 


 Abnormal Lab Results - Last 24 Hours (Table)











  01/08/19 01/08/19 01/09/19 Range/Units





  16:47 21:01 06:22 


 


Neutrophils #    8.2 H  (1.3-7.7)  k/uL


 


Lymphocytes #    0.7 L  (1.0-4.8)  k/uL


 


Carbon Dioxide     (22-30)  mmol/L


 


BUN     (7-17)  mg/dL


 


POC Glucose (mg/dL)  236 H  177 H   (75-99)  mg/dL














  01/09/19 01/09/19 Range/Units





  06:22 11:34 


 


Neutrophils #    (1.3-7.7)  k/uL


 


Lymphocytes #    (1.0-4.8)  k/uL


 


Carbon Dioxide  36 H   (22-30)  mmol/L


 


BUN  58 H   (7-17)  mg/dL


 


POC Glucose (mg/dL)   193 H  (75-99)  mg/dL








 Microbiology - Last 24 Hours (Table)











 01/08/19 23:00 Urine Culture - Preliminary





 Urine,Voided 


 


 01/04/19 15:00 Blood Culture - Preliminary





 Blood    No Growth after 96 hours


 


 01/05/19 23:05 Urine Culture - Final





 Urine,Ureter    Enterococcus faecalis














Assessment and Plan


Assessment: 





Impression:





#1 Acute hypoxic respiratory failure secondary to a right perihilar infiltrate 

suspect aspiration pneumonia.  Chest x-ray improved.





#2 Recent procedure for epistaxis.





#3 History of atrial fibrillation, currently sinus rhythm.





#4 Coronary artery disease.





#5 Diabetes mellitus.





#6 Gastroesophageal reflux disease.





#7 Hypertension.





#8 Hyperlipidemia.





#9 Osteoarthritis.





#10 Former smoker.





#11 Anxiety/depression.





Plan:





The patient was seen and evaluated by Dr. Watts.  We will continue with 

antibiotics in the form of azithromycin.  Continue Bronchodilators.  Continue 

diuretics.  Titrate down the FiO2 as tolerated.  Heparin for DVT prophylaxis.  

We'll continue to follow and make further recommendations based on her clinical 

status.  Plan is to convert to oral Lasix and probable discharge in the a.m.





I, the cosigning physician, performed a history & physical examination of the 

patient. Lungs sounds crackles in the posterior bases.  Maintaining good O2 

saturations in the 90s on 2 L/m per nasal cannula.  I discussed the assessment 

and plan of care with my nurse practitioner, Etta Adames. I attest to the above 

note as dictated by her.

## 2019-01-09 NOTE — P.PN
Subjective


Progress Note Date: 01/09/19





This is a very pleasant 78-year-old female patient she is a fairly poor 

historian, who has history of atrial fibrillation, coronary artery disease, 

diabetes , gastroesophageal reflux disease, hyperlipidemia, hypertension, 

osteoarthritis, obstructive sleep apnea, chronic obstructive pulmonary disease 

on oxygen at 3 L at home.  Previous smoking history.  The patient recently had 

undergone a procedure for a nosebleed and a few days later developed increasing 

shortness of breath cough and congestion and was brought here to the emergency 

room yesterday for the same.  Initial Chest -ray revealed a patchy right 

perihilar infiltrate and cardiomegaly.  Repeat chest x-ray performed today 

showed cardiomegaly with prominence of the pulmonary arteries suggesting some 

pulmonary artery hypertension.  Cannot exclude bilateral effusions.  White 

blood cell count 13.1, hemoglobin 11.2, platelet count 304.  Sodium 144, 

potassium 3.5, BUN 39, creatinine 0.8.  Magnesium 1.4, alk phos 132, AST 45, 

ALT 47.  Initial troponin 0.067.  BNP level 13,600.  Influenza A and B are 

negative.  Patient was initiated on IV antibiotics for pneumonia, she has also 

been started on IV Lasix.  According to the patient, today she started putting 

out a significant amount of urine.  He is to cough up dark tan sputum.  Blood 

pressure this morning 122/60 with a heart rate in the 60s, 96% on 4 L.








01/06/2019


Patient seen and examined this morning, sitting up in the chair at bedside, 

sinus also present.  Diuresing on Lasix, weight is down 1 kg today.  Continues 

to cough up a significant amount of tan sputum.  Continues to have persistent 

cough.  Blood pressure 136/70 with a heart rate in the 70s, 94% on 3 L.  White 

blood cell count 8.5, hemoglobin 11.5, platelet count 287.  Sodium 139, 

potassium 4.4, BUN 45, creatinine 1.0.








01/07/2019





Patient was seen and examined this morning, sitting up in her bed.  She does 

state that overall her breathing is improving.  She continues to have a cough 

however today it appears to be much less productive.  She does feel as though 

she's putting out a lot of urine however her weight is not in indicating a 

downward trend.  White blood cell count is normal, hemoglobin 11, platelet 

count 280.  Sodium 138, potassium 4.6, BUN 55 and creatinine 0.9.








01/08/2019


Patient was seen and examined this morning, states that her breathing is 

significantly improved, continues to have cough, not much sputum production 

today.  Continues to be on IV Lasix.  White blood cell count 7.4, hemoglobin 

11.2, platelet count 237.  Sodium 138, potassium 4.4, BUN 58, creatinine 0.9.








01/09/2019


Patient seen and examined this morning, continues to improve overall.  Denies 

cough today.  Blood pressure 108/50, heart rate in the 70s, 96% on 3 L of 

oxygen.  White blood cell count 9.4, hemoglobin 11.6, platelet count 263.  

Sodium 140, potassium 4.5, BUN 58, creatinine 1.0.





Objective





- Vital Signs


Vital signs: 


 Vital Signs











Temp  97.4 F L  01/09/19 04:00


 


Pulse  74   01/09/19 09:08


 


Resp  18   01/09/19 04:00


 


BP  107/53   01/09/19 04:00


 


Pulse Ox  98   01/09/19 09:00








 Intake & Output











 01/08/19 01/09/19 01/09/19





 18:59 06:59 18:59


 


Intake Total 580 20 


 


Balance 580 20 


 


Weight 96.4 kg 97.8 kg 


 


Intake:   


 


  IV  20 


 


    0.9  20 


 


  Intake, IV Titration 100  





  Amount   


 


    Sodium Chloride 0.9% 1, 100  





    000 ml @ 20 mls/hr IV .   





    Q24H [facility] Rx#:[id number]   


 


  Oral 480  


 


Other:   


 


  Voiding Method Diaper Diaper 





 Incontinent Incontinent 


 


  # Voids 1 2 


 


  # Bowel Movements 1 1 














- Exam





PHYSICAL EXAMINATION: 





GENERAL: 78-year-old  female in no acute distress at the time of my 

examination





HEENT: Head is atraumatic, normocephalic.  Pupils equal, round.  Sclera 

anicteric. Conjunctiva are clear.  Mucous membranes of the mouth are moist.  

Neck is supple.  There is no  elevated jugular venous pressure.  No carotid  

bruit is heard.





HEART EXAMINATION: Heart S1, S2 normal.  No murmur or gallop heard.





CHEST EXAMINATION: Lungs reveal decreased air exchange, much less wheezing 

today.  





ABDOMEN:  Soft, nontender. Bowel sounds are heard. No organomegaly noted.


 


EXTREMITIES: 2+ peripheral pulses with no  evidence of peripheral edema and no 

calf tenderness noted.





NEUROLOGIC patient is awake, alert and oriented 3 .


 


.





- Labs


CBC & Chem 7: 


 01/09/19 06:22





 01/09/19 06:22


Labs: 


 Abnormal Lab Results - Last 24 Hours (Table)











  01/08/19 01/08/19 01/08/19 Range/Units





  11:34 16:47 21:01 


 


Neutrophils #     (1.3-7.7)  k/uL


 


Lymphocytes #     (1.0-4.8)  k/uL


 


Carbon Dioxide     (22-30)  mmol/L


 


BUN     (7-17)  mg/dL


 


POC Glucose (mg/dL)  203 H  236 H  177 H  (75-99)  mg/dL














  01/09/19 01/09/19 Range/Units





  06:22 06:22 


 


Neutrophils #  8.2 H   (1.3-7.7)  k/uL


 


Lymphocytes #  0.7 L   (1.0-4.8)  k/uL


 


Carbon Dioxide   36 H  (22-30)  mmol/L


 


BUN   58 H  (7-17)  mg/dL


 


POC Glucose (mg/dL)    (75-99)  mg/dL








 Microbiology - Last 24 Hours (Table)











 01/04/19 15:00 Blood Culture - Preliminary





 Blood    No Growth after 96 hours


 


 01/05/19 23:05 Urine Culture - Final





 Urine,Ureter    Enterococcus faecalis














Assessment and Plan


Plan: 





Assessment and plan





#1 Acute hypoxic respiratory failure secondary to a right perihilar infiltrate 

suspect aspiration pneumonia.





#2 Recent procedure for epistaxis.





#3 History of atrial fibrillation, , paroxysmal, currently sinus rhythm.





#4 Coronary artery disease.  Follows with a cardiologist out of town





#5 Diabetes mellitus.





#6 Gastroesophageal reflux disease.





#7 Hypertension.





#8 Hyperlipidemia.





#9 Osteoarthritis.





#10 Former smoker.





#11 diastolic congestive heart failure acute on chronic.








Plan





We will continue current dose of IV Lasix for 24 hours, continue to monitor 

intake and output along with daily weights and daily lytes BUN and creatinine.  

Change to oral diuretics in the morning if stable.


DNP note has been reviewed, I agree with a documented findings and plan of 

care.  Patient was seen and examined.

## 2019-01-09 NOTE — P.PN
Subjective


Patient the appears to have come in with the symptoms of sepsis with mild low-

grade fever and leukocytosis appears to have perihilar pneumonia and patient is 

being treated her with ceftriaxone is from azithromycin.  Patient does not 

appear to have any UTI-like symptoms but urine analysis and urine culture was 

often urine culture is positive for Enterococcus faecalis but patient has 

ALLERGY to penicillins and vancomycin.  I'm unsure whether patient actually has 

a UTI are this is asymptomatic bacteriuria because of that reason I'll consult 

infectious disease.  Patient is also being treated for COPD with systemic 

steroids which will be switched to oral and patient is also being treated for 

chronic diastolic dysfunction with acute examination patient is presently on IV 

Lasix.  Patient is bit tired beyond which patient is otherwise clinically doing 

well is being evaluated for subacute rehabilitation placement





01/09/2019


Patient was evaluated by infectious disease and there is no evidence of urinary 

tract infection because of which a specific of urine cultures being positive 

for enterococcus patient will not need any additional antibiotics will continue 

with present antibiotics patient is comparing of tightness which I believe is 

secondary to Lasix and diuretic therapy.  Cardiology is recommending 

continuation of IV Lasix which will be continued.  Patient will be transferred 

to cardiac unit possibly of discharge tomorrow to subacute rehabilitation.





Constitutional:  denied any fever.


Cardio vascular: denied any chest pain, palpitations


Gastrointestinal denied any nausea vomiting


Pulmonary: Denied any shortness of breath cough


Neurologic denied any new focal deficits





All inpatient medications were reviewed and appropriate changes in these 

medications as dictated in the interval history and assessment and plan.








Objective





- Vital Signs


Vital signs: 


 Vital Signs











Temp  97.4 F L  01/09/19 04:00


 


Pulse  74   01/09/19 09:08


 


Resp  18   01/09/19 04:00


 


BP  107/53   01/09/19 04:00


 


Pulse Ox  98   01/09/19 09:00








 Intake & Output











 01/08/19 01/09/19 01/09/19





 18:59 06:59 18:59


 


Intake Total 580 20 


 


Balance 580 20 


 


Weight 96.4 kg 97.8 kg 


 


Intake:   


 


  IV  20 


 


    0.9  20 


 


  Intake, IV Titration 100  





  Amount   


 


    Sodium Chloride 0.9% 1, 100  





    000 ml @ 20 mls/hr IV .   





    Q24H [facility] Rx#:[id number]   


 


  Oral 480  


 


Other:   


 


  Voiding Method Diaper Diaper 





 Incontinent Incontinent 


 


  # Voids 1 2 


 


  # Bowel Movements 1 1 














- Exam








PHYSICAL EXAMINATION: 





GENERAL: The patient is alert and oriented x3, not in any acute distress. Well 

developed, well nourished. 


HEENT: Pupils are round and equally reacting to light. EOMI. No scleral 

icterus. No conjunctival pallor. Normocephalic, atraumatic. No pharyngeal 

erythema. No thyromegaly. 


CARDIOVASCULAR: S1 and S2 present. No murmurs, rubs, or gallops.  History of 

mildly elevated JVD


PULMONARY: Chest is clear to auscultation, no wheezing or crackles. 


ABDOMEN: Soft, nontender, nondistended, normoactive bowel sounds. No palpable 

organomegaly. 


MUSCULOSKELETAL: No joint swelling or deformity.


EXTREMITIES: No cyanosis, clubbing, patient has 2+ pitting bilateral pedal 

edema extending up to knee


NEUROLOGICAL: Gross neurological examination did not reveal any focal deficits. 


SKIN: No rashes. 

















- Labs


CBC & Chem 7: 


 01/09/19 06:22





 01/09/19 06:22


Labs: 


 Abnormal Lab Results - Last 24 Hours (Table)











  01/08/19 01/08/19 01/08/19 Range/Units





  11:34 16:47 21:01 


 


Neutrophils #     (1.3-7.7)  k/uL


 


Lymphocytes #     (1.0-4.8)  k/uL


 


Carbon Dioxide     (22-30)  mmol/L


 


BUN     (7-17)  mg/dL


 


POC Glucose (mg/dL)  203 H  236 H  177 H  (75-99)  mg/dL














  01/09/19 01/09/19 Range/Units





  06:22 06:22 


 


Neutrophils #  8.2 H   (1.3-7.7)  k/uL


 


Lymphocytes #  0.7 L   (1.0-4.8)  k/uL


 


Carbon Dioxide   36 H  (22-30)  mmol/L


 


BUN   58 H  (7-17)  mg/dL


 


POC Glucose (mg/dL)    (75-99)  mg/dL








 Microbiology - Last 24 Hours (Table)











 01/04/19 15:00 Blood Culture - Preliminary





 Blood    No Growth after 96 hours


 


 01/05/19 23:05 Urine Culture - Final





 Urine,Ureter    Enterococcus faecalis














Assessment and Plan


Plan: 


-Sepsis secondary to prerenal of pneumonia most probably my suspicion is low 

for urinary tract infection patient may have a symptomatic bacteriuria patient 

symptoms improved with Rocephin and azithromycin and patient is afebrile doesn'

t have any leukocytosis.was evaluated by infectious disease no additional 

antibiotics for positive cultures


-COPD with acute exacerbation patient symptoms improved no wheezing today 

patient will be switched to oral steroids


-Congestive chronic diastolic dysfunction with acute exacerbation can you with 

IV Lasix and the patient probably can be discharged tomorrow oral Lasix.


-Moderate mitral regurgitation


-Elevated liver enzymes secondary to hepatic congestion was stable liver enzymes


-Coronary artery disease


-Atrial fibrillation presently rate controlled


-Type 2 diabetes mellitus


-Hypertension


-Hyperlipidemia


-Generalized deconditioning.

## 2019-01-10 LAB
ANION GAP SERPL CALC-SCNC: 2 MMOL/L
BUN SERPL-SCNC: 58 MG/DL (ref 7–17)
CALCIUM SPEC-MCNC: 9.7 MG/DL (ref 8.4–10.2)
CHLORIDE SERPL-SCNC: 99 MMOL/L (ref 98–107)
CO2 SERPL-SCNC: 38 MMOL/L (ref 22–30)
GLUCOSE BLD-MCNC: 119 MG/DL (ref 75–99)
GLUCOSE BLD-MCNC: 153 MG/DL (ref 75–99)
GLUCOSE BLD-MCNC: 177 MG/DL (ref 75–99)
GLUCOSE BLD-MCNC: 276 MG/DL (ref 75–99)
GLUCOSE SERPL-MCNC: 118 MG/DL (ref 74–99)
POTASSIUM SERPL-SCNC: 4.4 MMOL/L (ref 3.5–5.1)
SODIUM SERPL-SCNC: 139 MMOL/L (ref 137–145)

## 2019-01-10 RX ADMIN — HYDROXYCHLOROQUINE SULFATE SCH MG: 200 TABLET, FILM COATED ORAL at 09:13

## 2019-01-10 RX ADMIN — IPRATROPIUM BROMIDE AND ALBUTEROL SULFATE SCH: .5; 3 SOLUTION RESPIRATORY (INHALATION) at 23:33

## 2019-01-10 RX ADMIN — AZITHROMYCIN SCH MG: 500 TABLET, FILM COATED ORAL at 09:32

## 2019-01-10 RX ADMIN — Medication SCH MG: at 20:20

## 2019-01-10 RX ADMIN — HEPARIN SODIUM SCH UNIT: 5000 INJECTION, SOLUTION INTRAVENOUS; SUBCUTANEOUS at 09:13

## 2019-01-10 RX ADMIN — INSULIN ASPART SCH UNIT: 100 INJECTION, SOLUTION INTRAVENOUS; SUBCUTANEOUS at 12:50

## 2019-01-10 RX ADMIN — ALLOPURINOL SCH MG: 300 TABLET ORAL at 09:30

## 2019-01-10 RX ADMIN — FUROSEMIDE SCH MG: 20 TABLET ORAL at 16:19

## 2019-01-10 RX ADMIN — IPRATROPIUM BROMIDE AND ALBUTEROL SULFATE SCH: .5; 3 SOLUTION RESPIRATORY (INHALATION) at 03:18

## 2019-01-10 RX ADMIN — IPRATROPIUM BROMIDE AND ALBUTEROL SULFATE SCH ML: .5; 3 SOLUTION RESPIRATORY (INHALATION) at 21:08

## 2019-01-10 RX ADMIN — PANTOPRAZOLE SODIUM SCH MG: 40 TABLET, DELAYED RELEASE ORAL at 05:32

## 2019-01-10 RX ADMIN — Medication SCH UNIT: at 09:13

## 2019-01-10 RX ADMIN — INSULIN DETEMIR SCH UNIT: 100 INJECTION, SOLUTION SUBCUTANEOUS at 09:13

## 2019-01-10 RX ADMIN — INSULIN ASPART SCH UNIT: 100 INJECTION, SOLUTION INTRAVENOUS; SUBCUTANEOUS at 20:20

## 2019-01-10 RX ADMIN — HEPARIN SODIUM SCH UNIT: 5000 INJECTION, SOLUTION INTRAVENOUS; SUBCUTANEOUS at 20:19

## 2019-01-10 RX ADMIN — IPRATROPIUM BROMIDE AND ALBUTEROL SULFATE SCH ML: .5; 3 SOLUTION RESPIRATORY (INHALATION) at 07:39

## 2019-01-10 RX ADMIN — Medication SCH MG: at 09:31

## 2019-01-10 RX ADMIN — SERTRALINE HYDROCHLORIDE SCH MG: 100 TABLET ORAL at 09:14

## 2019-01-10 RX ADMIN — FUROSEMIDE SCH MG: 20 TABLET ORAL at 09:14

## 2019-01-10 RX ADMIN — INSULIN ASPART SCH: 100 INJECTION, SOLUTION INTRAVENOUS; SUBCUTANEOUS at 06:07

## 2019-01-10 RX ADMIN — IPRATROPIUM BROMIDE AND ALBUTEROL SULFATE SCH ML: .5; 3 SOLUTION RESPIRATORY (INHALATION) at 15:47

## 2019-01-10 RX ADMIN — OXYCODONE HYDROCHLORIDE AND ACETAMINOPHEN SCH MG: 500 TABLET ORAL at 09:31

## 2019-01-10 RX ADMIN — CHOLESTYRAMINE SCH GM: 4 POWDER, FOR SUSPENSION ORAL at 18:14

## 2019-01-10 RX ADMIN — HYDROXYCHLOROQUINE SULFATE SCH MG: 200 TABLET, FILM COATED ORAL at 20:19

## 2019-01-10 RX ADMIN — CEFAZOLIN SCH: 330 INJECTION, POWDER, FOR SOLUTION INTRAMUSCULAR; INTRAVENOUS at 19:34

## 2019-01-10 RX ADMIN — METOPROLOL SUCCINATE SCH MG: 25 TABLET, EXTENDED RELEASE ORAL at 09:14

## 2019-01-10 RX ADMIN — INSULIN ASPART SCH UNIT: 100 INJECTION, SOLUTION INTRAVENOUS; SUBCUTANEOUS at 18:14

## 2019-01-10 RX ADMIN — IPRATROPIUM BROMIDE AND ALBUTEROL SULFATE SCH ML: .5; 3 SOLUTION RESPIRATORY (INHALATION) at 11:17

## 2019-01-10 RX ADMIN — ASPIRIN 81 MG CHEWABLE TABLET SCH MG: 81 TABLET CHEWABLE at 09:33

## 2019-01-10 NOTE — P.DS
Providers


Date of admission: 


01/04/19 18:05





Attending physician: 


Ubaldo Hunter MD





Consults: 





 





01/04/19 18:05


Consult Physician Routine 


   Consulting Provider: Chalino Cueto


   Consult Reason/Comments: Elevated troponin, CHF


   Do you want consulting provider notified?: Yes





01/04/19 21:56


Consult Physician Routine 


   Consulting Provider: Helen Arias


   Consult Reason/Comments: copd


   Do you want consulting provider notified?: Yes





01/08/19 16:10


Consult Physician Routine 


   Consulting Provider: Bobby Powell


   Consult Reason/Comments: R/O UTI


   Do you want consulting provider notified?: Yes











Primary care physician: 


[facility] Course: 





Patient the appears to have come in with the symptoms of sepsis with mild low-

grade fever and leukocytosis appears to have perihilar pneumonia and patient is 

being treated her with ceftriaxone is from azithromycin.  Patient does not 

appear to have any UTI-like symptoms but urine analysis and urine culture was 

often urine culture is positive for Enterococcus faecalis but patient has 

ALLERGY to penicillins and vancomycin.  I'm unsure whether patient actually has 

a UTI are this is asymptomatic bacteriuria because of that reason I'll consult 

infectious disease.  Patient is also being treated for COPD with systemic 

steroids which will be switched to oral and patient is also being treated for 

chronic diastolic dysfunction with acute examination patient is presently on IV 

Lasix.  Patient is bit tired beyond which patient is otherwise clinically doing 

well is being evaluated for subacute rehabilitation placement





01/09/2019


Patient was evaluated by infectious disease and there is no evidence of urinary 

tract infection because of which a specific of urine cultures being positive 

for enterococcus patient will not need any additional antibiotics will continue 

with present antibiotics patient is comparing of tightness which I believe is 

secondary to Lasix and diuretic therapy.  Cardiology is recommending 

continuation of IV Lasix which will be continued.  Patient will be transferred 

to cardiac unit possibly of discharge tomorrow to subacute rehabilitation.








01/10/2019


Patient still has some pedal edema with significant improvement since hospital 

admission.  Patient will not require any more antibiotics patient was treated 

with ceftriaxone and azithromycin completed a course of antibiotics.  

Infectious disease evaluated the patient.  Patient will be discharged on 60 

twice a day of Lasix.  Patient is still tired because of polypharmacy.





PHYSICAL EXAMINATION: 





GENERAL: The patient is alert and oriented x3, not in any acute distress. Well 

developed, well nourished. 


HEENT: Pupils are round and equally reacting to light. EOMI. No scleral 

icterus. No conjunctival pallor. Normocephalic, atraumatic. No pharyngeal 

erythema. No thyromegaly. 


CARDIOVASCULAR: S1 and S2 present. No murmurs, rubs, or gallops.  History of 

mildly elevated JVD


PULMONARY: Chest is clear to auscultation, no wheezing or crackles. 


ABDOMEN: Soft, nontender, nondistended, normoactive bowel sounds. No palpable 

organomegaly. 


MUSCULOSKELETAL: No joint swelling or deformity.


EXTREMITIES: No cyanosis, clubbing, patient has 1+ pitting bilateral pedal 

edema extending up to knee


NEUROLOGICAL: Gross neurological examination did not reveal any focal deficits. 


SKIN: No rashes. 





Assessment and Plan


Plan: 


-Sepsis secondary to perihilar pneumonia most probably my suspicion is low for 

urinary tract infection patient may have asymptomatic bacteriuria further 

antibiotic therapy as per infectious disease


-COPD with acute exacerbation patient symptoms improved no wheezing today 

patient will be switched to oral steroids


-Congestive chronic diastolic dysfunction with acute exacerbation


-Moderate mitral regurgitation


-Elevated liver enzymes secondary to hepatic congestion was stable liver enzymes


-Coronary artery disease


-Atrial fibrillation presently rate controlled


-Type 2 diabetes mellitus


-Hypertension


-Hyperlipidemia


-Generalized deconditioning.








Patient Condition at Discharge: Serious





Plan - Discharge Summary


Discharge Rx Participant: No


New Discharge Prescriptions: 


New


   Furosemide [Lasix] 60 mg PO BID@0900,1600  tab


   predniSONE 10 mg PO DAILY #30 tab





Continue


   Ascorbic Acid [Vitamin C] 500 mg PO DAILY


   Sertraline [Zoloft] 100 mg PO DAILY


   Insulin Glargine,Hum.rec.anlog [Lantus Solostar] 25 unit SQ DAILY


   Insulin Lispro [humaLOG Kwikpen] See Protocol SQ BID


   Ferrous Gluconate 324 mg PO MOWEFR


   Atorvastatin [Lipitor] 10 mg PO DAILY


   Aspirin EC [Ecotrin Low Dose] 81 mg PO DAILY


   Cholecalciferol (Vitamin D3) [Vitamin D3] 2,000 unit PO DAILY


   Albuterol Inhaler [Ventolin Hfa Inhaler] 2 puff INHALATION RT-Q4H PRN


     PRN Reason: SOB/COPD


   Nystatin 100,000Unit/gm Cream [Mycostatin Cream] 1 applic TOPICAL BID PRN


     PRN Reason: Rash


   Ipratropium-Albuterol Nebulize [Duoneb 0.5 mg-3 mg/3 ml Soln] 3 ml 

INHALATION RT-QID


   Acetaminophen Tab [Tylenol] 650 mg PO Q6H PRN


     PRN Reason: PAIN/FEVER


   Metoprolol Succinate (ER) [Toprol XL] 25 mg PO DAILY #30 tab.er.24h


   Calcium Carbonate [Tums] 500 mg PO TID PRN


     PRN Reason: Gi Upset


   Bisacodyl [Dulcolax] 10 mg RECTAL ONCE PRN


     PRN Reason: Constipation


   Sodium Chloride [Ocean] 2 spray EA NOSTRIL TID


   Sennosides/Docusate Sodium [Senna-S Laxative Tablet] 2 tab PO TUTHSA PRN


     PRN Reason: Constipation


   Omeprazole 20 mg PO DAILY


   Melatonin 3 mg PO HS


   Magnesium Oxide [Mag-Ox] 400 mg PO DAILY


   Hydroxychloroquine Sulfate [Plaquenil] 200 mg PO BID


   Allopurinol [Zyloprim] 300 mg PO DAILY


   HYDROcodone/APAP 5-325MG [Norco 5-325] 1 tab PO Q6HR PRN #12 tab


     PRN Reason: Pain





Discontinued


   Furosemide [Lasix] 40 mg PO BID


   Diltiazem Oral [Cardizem*] 30 mg PO TID #90 tab


Discharge Medication List





Acetaminophen Tab [Tylenol] 650 mg PO Q6H PRN 07/27/18 [History]


Albuterol Inhaler [Ventolin Hfa Inhaler] 2 puff INHALATION RT-Q4H PRN 07/27/18 [

History]


Ascorbic Acid [Vitamin C] 500 mg PO DAILY 07/27/18 [History]


Aspirin EC [Ecotrin Low Dose] 81 mg PO DAILY 07/27/18 [History]


Atorvastatin [Lipitor] 10 mg PO DAILY 07/27/18 [History]


Cholecalciferol (Vitamin D3) [Vitamin D3] 2,000 unit PO DAILY 07/27/18 [History]


Ferrous Gluconate 324 mg PO MOWEFR 07/27/18 [History]


Insulin Glargine,Hum.rec.anlog [Lantus Solostar] 25 unit SQ DAILY 07/27/18 [

History]


Insulin Lispro [humaLOG Kwikpen] See Protocol SQ BID 07/27/18 [History]


Ipratropium-Albuterol Nebulize [Duoneb 0.5 mg-3 mg/3 ml Soln] 3 ml INHALATION RT

-QID 07/27/18 [History]


Nystatin 100,000Unit/gm Cream [Mycostatin Cream] 1 applic TOPICAL BID PRN 07/27/ 18 [History]


Sertraline [Zoloft] 100 mg PO DAILY 07/27/18 [History]


Metoprolol Succinate (ER) [Toprol XL] 25 mg PO DAILY #30 tab.er.24h 07/30/18 [Rx

]


Allopurinol [Zyloprim] 300 mg PO DAILY 01/04/19 [History]


Bisacodyl [Dulcolax] 10 mg RECTAL ONCE PRN 01/04/19 [History]


Calcium Carbonate [Tums] 500 mg PO TID PRN 01/04/19 [History]


Hydroxychloroquine Sulfate [Plaquenil] 200 mg PO BID 01/04/19 [History]


Magnesium Oxide [Mag-Ox] 400 mg PO DAILY 01/04/19 [History]


Melatonin 3 mg PO HS 01/04/19 [History]


Omeprazole 20 mg PO DAILY 01/04/19 [History]


Sennosides/Docusate Sodium [Senna-S Laxative Tablet] 2 tab PO TUTHSA PRN 01/04/ 19 [History]


Sodium Chloride [Ocean] 2 spray EA NOSTRIL TID 01/04/19 [History]


Furosemide [Lasix] 60 mg PO BID@0900,1600  tab 01/10/19 [Rx]


HYDROcodone/APAP 5-325MG [Norco 5-325] 1 tab PO Q6HR PRN #12 tab 01/10/19 [Rx]


predniSONE 10 mg PO DAILY #30 tab 01/10/19 [Rx]








Follow up Appointment(s)/Referral(s): 


[facility], [NON-STAFF] - As Needed


Amado Augustin MD [Primary Care Provider] - 1-2 days


Activity/Diet/Wound Care/Special Instructions: 


[facility]


Discharge Disposition: TRANSFER TO SNF/F

## 2019-01-10 NOTE — PN
PROGRESS NOTE



DATE OF SERVICE:

01/10/2019.



REASON FOR FOLLOW UP:

Positive urine culture, diarrhea.



INTERVAL HISTORY:

The patient is currently afebrile.  She has been breathing comfortably.  Denies having

any chest pain, chills, or cough.  The patient denies any urinary symptoms, however,

has developed diarrhea with multiple loose stools today.



PHYSICAL EXAMINATION:

Blood pressure 145/65 with a pulse of 66, temperature of 98.7, she is 92% on room air.

GENERAL DESCRIPTION: An elderly female up in the chair in no distress.

RESPIRATORY SYSTEM:  Unlabored breathing with decreased breath sounds. No wheeze.

HEART: S1, S2.  Regular rate and rhythm.

ABDOMEN: Soft, no tenderness.



LABS:

BUN of 58, creatinine is 0.81.  Repeat UA was requested.



DIAGNOSTIC IMPRESSION AND PLAN:

1. Patient with a positive urine culture with enterococcus faecium urinary tract

    infection. UA positive pointing towards possible contamination, no need for any

    antibiotic therapy for the same.

2. The patient has now developed diarrhea with concern for possible C difficile.

    Stool for C difficile has been requested.  Will add Questran for symptomatic

    relief.  Continue supportive care.





MMODL / IJN: [id number] / Job#: [id number]

## 2019-01-10 NOTE — P.PN
Subjective


Progress Note Date: 01/10/19


Principal diagnosis: 


 Acute hypoxic respiratory failure, suspect aspiration pneumonia





This is a very pleasant 78-year-old female patient who follows with Dr. Augustin 

as her primary care physician.  She has history of atrial fibrillation, 

coronary artery disease, diabetes Stanislav, gastroesophageal reflux disease, 

hyperlipidemia, hypertension, osteoarthritis, obstructive sleep apnea, chronic 

obstructive pulmonary disease on oxygen at 3 L at home.  Previous smoking 

history.  The patient recently had undergone a procedure for a nosebleed and a 

few days later developed increasing shortness of breath cough and congestion 

and was brought here to the emergency room yesterday for the same.  As x-ray 

revealed a patchy right perihilar infiltrate and cardiomegaly.  He did have O2 

saturation of 81% on room air on presentation.  We're consulted for the same.  

She is seen today on the selective care unit.  She is awake and alert sitting 

up in a chair at the bedside.  She is somewhat of a poor historian.  She 

currently denies any worsening shortness of breath.  She has a loose 

nonproductive cough.  Taking O2 saturations in the upper 90s on 4 L/m per nasal 

cannula.  She's been afebrile.  Hemodynamically stable.  Sputum cultures 

pending.  White count 13.1.  Hemoglobin 11.2.  Creatinine 0.88.  Influenza 

screen negative.





Patient was reevaluated today on 1/6/2019, feeling much better, breathing a lot 

easier.  Minimal cough, the cough is productive with yellow phlegm, denies any 

fever no chills no hemoptysis no chest pain.  Denies any wheezing.  Patient 

remains on 4 L nasal cannula, and her O2 saturation is in the 90s.  CBC was 

noted to be normal.  Electrolytes are normal BUN is 45 creatinine is 1.05. 





On 01/07/2019 patient seen in follow-up on selective care unit.  She is on 2 L 

per nasal cannula her pulse ox is 100%, patient is afebrile.  Lung sounds are 

diminished, no wheezes, no rhonchi.  Urine culture was positive for group D 

enterococcus, sputum culture and blood culture showed no growth.  Current 

antibiotics include Zithromax, and Rocephin, patient is on IV Lasix at 40 mg 

daily,  repeat chest x-ray is pending.  Patient is on IV steroids, belies 

bronchodilators, she states she is breathing easier.  Today's labs have been 

reviewed, WBC is 7.7, hemoglobin is 11.0, elective was were within normal limits

, BUN is 55 and creatinine 0.94.





On 01/10/2019 patient seen in follow-up on selective care unit.  He is calm and 

comfortable, in no acute distress, patient is on 2 L per nasal cannula, pulse 

ox of 95%, patient is afebrile, hemodynamically stable, still a little 

bronchospastic, with a few scattered rhonchi, but overall improving.  Urine 

culture was positive for Enterococcus faecalis, and group D enterococcus.  

Blood and sputum cultures were negative.  Today's labs have been reviewed, 

shows sodium of 139, potassium is 4.4, chloride is 99, CO2 38, BUN is 50, 

creatinine 0.81.  Patient is on oral Lasix, Zithromax, nebulized bronchodilators

, and oral prednisone.  Oral clinically is improving, and states she will be 

discharged to ECF today








Objective





- Vital Signs


Vital signs: 


 Vital Signs











Temp  97.4 F L  01/10/19 08:45


 


Pulse  68   01/10/19 15:47


 


Resp  18   01/10/19 08:45


 


BP  144/75   01/10/19 08:45


 


Pulse Ox  95   01/10/19 05:27








 Intake & Output











 01/09/19 01/10/19 01/10/19





 18:59 06:59 18:59


 


Intake Total 240 20 800


 


Balance 240 20 800


 


Weight  95.3 kg 


 


Intake:   


 


  IV  20 


 


    0.9  20 


 


  Oral 240  800


 


Other:   


 


  Voiding Method Diaper Diaper Diaper





 Incontinent Incontinent Incontinent


 


  # Voids 1  














- Exam


Physical Exam: Revealed a 78-year-old female in no distress.


Head: Atraumatic normocephalic.


HEENT:[Neck is supple.] [No neck masses.] [No thyromegaly.] [No JVD.]  PERRLA, 

EOMI, no icterus.


Chest: [Breath sounds bilaterally are diminished, with a few scattered wheezes 

and a few rhonchi


Cardiac Exam: [Normal S1 and S2, no S3 gallop, no murmur.]


Abdomen: [Soft, nontender,  no megaly, no rebound, no guarding, normal bowel 

sounds.]


Extremities: [No clubbing, no edema, no cyanosis.]


Neurological Exam: [No focal neurologic deficit.


Psychiatric: Normal mood affect and mental status examination.








- Labs


CBC & Chem 7: 


 01/09/19 06:22





 01/10/19 06:33


Labs: 


 Abnormal Lab Results - Last 24 Hours (Table)











  01/09/19 01/09/19 01/10/19 Range/Units





  17:01 20:19 05:49 


 


Carbon Dioxide     (22-30)  mmol/L


 


BUN     (7-17)  mg/dL


 


Glucose     (74-99)  mg/dL


 


POC Glucose (mg/dL)  164 H  258 H  119 H  (75-99)  mg/dL














  01/10/19 01/10/19 Range/Units





  06:33 11:43 


 


Carbon Dioxide  38 H   (22-30)  mmol/L


 


BUN  58 H   (7-17)  mg/dL


 


Glucose  118 H   (74-99)  mg/dL


 


POC Glucose (mg/dL)   153 H  (75-99)  mg/dL








 Microbiology - Last 24 Hours (Table)











 01/08/19 23:00 Urine Culture - Preliminary





 Urine,Voided    Group D Enterococcus


 


 01/04/19 15:00 Blood Culture - Preliminary





 Blood    No Growth after 120 hours














Assessment and Plan


Plan: 


 Assessment:





#1 Acute hypoxic respiratory failure secondary to a right perihilar infiltrate 

suspect aspiration pneumonia.





#2 Recent procedure for epistaxis.





#3 History of atrial fibrillation, currently sinus rhythm.





#4 Coronary artery disease.





#5 Diabetes mellitus.





#6 Gastroesophageal reflux disease.





#7 Hypertension.





#8 Hyperlipidemia.





#9 Osteoarthritis.





#10 Former smoker.





#11 Anxiety/depression.





#12 acute urinary tract infection with urine cultures positive for Enterococcus 

faecalis, and group D enterococcus





Plan:





Patient is breathing easier, less bronchospastic, less short of breath, is 

maintaining good oxygenation on 3 L per nasal cannula, no fever or chills, 

microbiology results were noted, ID service progress note was noted, and repeat 

urinalysis with culture was requested, clinically patient is completely 

asymptomatic, no fever or chills.  Sputum blood culture are negative thus far.  

She has been diuresed, and transitioned over to oral Lasix.  She is being 

discharged to [facility] today.  We'll follow with Dr. Coates in one week in the office





I performed a history & physical examination of the patient and discussed their 

management with my nurse practitioner, Jeanie Soto.  I reviewed the nurse 

practitioner's note and agree with the documented findings and plan of care.  

Lung sounds are positive for diminished breath sounds, with a few wheezes.  The 

findings and the impression was discussed with the patient.  I attest to the 

documentation by the nurse practitioner. 





Time with Patient: Less than 30

## 2019-01-10 NOTE — P.PN
Subjective


Progress Note Date: 01/10/19





This is a very pleasant 78-year-old female patient she is a fairly poor 

historian, who has history of atrial fibrillation, coronary artery disease, 

diabetes , gastroesophageal reflux disease, hyperlipidemia, hypertension, 

osteoarthritis, obstructive sleep apnea, chronic obstructive pulmonary disease 

on oxygen at 3 L at home.  Previous smoking history.  The patient recently had 

undergone a procedure for a nosebleed and a few days later developed increasing 

shortness of breath cough and congestion and was brought here to the emergency 

room yesterday for the same.  Initial Chest -ray revealed a patchy right 

perihilar infiltrate and cardiomegaly.  Repeat chest x-ray performed today 

showed cardiomegaly with prominence of the pulmonary arteries suggesting some 

pulmonary artery hypertension.  Cannot exclude bilateral effusions.  White 

blood cell count 13.1, hemoglobin 11.2, platelet count 304.  Sodium 144, 

potassium 3.5, BUN 39, creatinine 0.8.  Magnesium 1.4, alk phos 132, AST 45, 

ALT 47.  Initial troponin 0.067.  BNP level 13,600.  Influenza A and B are 

negative.  Patient was initiated on IV antibiotics for pneumonia, she has also 

been started on IV Lasix.  According to the patient, today she started putting 

out a significant amount of urine.  He is to cough up dark tan sputum.  Blood 

pressure this morning 122/60 with a heart rate in the 60s, 96% on 4 L.








01/06/2019


Patient seen and examined this morning, sitting up in the chair at bedside, 

sinus also present.  Diuresing on Lasix, weight is down 1 kg today.  Continues 

to cough up a significant amount of tan sputum.  Continues to have persistent 

cough.  Blood pressure 136/70 with a heart rate in the 70s, 94% on 3 L.  White 

blood cell count 8.5, hemoglobin 11.5, platelet count 287.  Sodium 139, 

potassium 4.4, BUN 45, creatinine 1.0.








01/07/2019





Patient was seen and examined this morning, sitting up in her bed.  She does 

state that overall her breathing is improving.  She continues to have a cough 

however today it appears to be much less productive.  She does feel as though 

she's putting out a lot of urine however her weight is not in indicating a 

downward trend.  White blood cell count is normal, hemoglobin 11, platelet 

count 280.  Sodium 138, potassium 4.6, BUN 55 and creatinine 0.9.








01/08/2019


Patient was seen and examined this morning, states that her breathing is 

significantly improved, continues to have cough, not much sputum production 

today.  Continues to be on IV Lasix.  White blood cell count 7.4, hemoglobin 

11.2, platelet count 237.  Sodium 138, potassium 4.4, BUN 58, creatinine 0.9.








01/09/2019


Patient seen and examined this morning, continues to improve overall.  Denies 

cough today.  Blood pressure 108/50, heart rate in the 70s, 96% on 3 L of 

oxygen.  White blood cell count 9.4, hemoglobin 11.6, platelet count 263.  

Sodium 140, potassium 4.5, BUN 58, creatinine 1.0.





01/10/2019


Patient seen and examined this morning, sitting in a chair bedside.  Breathing 

is significantly improved.  Has more of a cough today.  I pressure 130/70 with 

a heart rate in the 70s, 95% on 3 L of oxygen.  Sodium 139, potassium 4.4, BUN 

58, creatinine 0.8





Objective





- Vital Signs


Vital signs: 


 Vital Signs











Temp  97 F L  01/10/19 05:27


 


Pulse  72   01/10/19 07:50


 


Resp  16   01/10/19 05:27


 


BP  131/76   01/10/19 05:27


 


Pulse Ox  95   01/10/19 05:27








 Intake & Output











 01/09/19 01/10/19 01/10/19





 18:59 06:59 18:59


 


Intake Total 240 20 200


 


Balance 240 20 200


 


Weight  95.3 kg 


 


Intake:   


 


  IV  20 


 


    0.9  20 


 


  Oral 240  200


 


Other:   


 


  Voiding Method Diaper Diaper 





 Incontinent Incontinent 


 


  # Voids 1  














- Exam





PHYSICAL EXAMINATION: 





GENERAL: 78-year-old  female in no acute distress at the time of my 

examination





HEENT: Head is atraumatic, normocephalic.  Pupils equal, round.  Sclera 

anicteric. Conjunctiva are clear.  Mucous membranes of the mouth are moist.  

Neck is supple.  There is no  elevated jugular venous pressure.  No carotid  

bruit is heard.





HEART EXAMINATION: Heart S1, S2 normal.  No murmur or gallop heard.





CHEST EXAMINATION: Lungs reveal decreased air exchange, much less wheezing 

today.  





ABDOMEN:  Soft, nontender. Bowel sounds are heard. No organomegaly noted.


 


EXTREMITIES: 2+ peripheral pulses with trace  evidence of peripheral edema and 

no calf tenderness noted.





NEUROLOGIC patient is awake, alert and oriented 3 .


 


.





- Labs


CBC & Chem 7: 


 01/09/19 06:22





 01/10/19 06:33


Labs: 


 Abnormal Lab Results - Last 24 Hours (Table)











  01/09/19 01/09/19 01/09/19 Range/Units





  11:34 17:01 20:19 


 


Carbon Dioxide     (22-30)  mmol/L


 


BUN     (7-17)  mg/dL


 


Glucose     (74-99)  mg/dL


 


POC Glucose (mg/dL)  193 H  164 H  258 H  (75-99)  mg/dL














  01/10/19 01/10/19 Range/Units





  05:49 06:33 


 


Carbon Dioxide   38 H  (22-30)  mmol/L


 


BUN   58 H  (7-17)  mg/dL


 


Glucose   118 H  (74-99)  mg/dL


 


POC Glucose (mg/dL)  119 H   (75-99)  mg/dL








 Microbiology - Last 24 Hours (Table)











 01/04/19 15:00 Blood Culture - Preliminary





 Blood    No Growth after 120 hours


 


 01/08/19 23:00 Urine Culture - Preliminary





 Urine,Voided 














Assessment and Plan


Plan: 





Assessment and plan





#1 Acute hypoxic respiratory failure secondary to a right perihilar infiltrate 

suspect aspiration pneumonia.





#2 Recent procedure for epistaxis.





#3 History of atrial fibrillation, , paroxysmal, currently sinus rhythm.





#4 Coronary artery disease.  Follows with a cardiologist out of town





#5 Diabetes mellitus.





#6 Gastroesophageal reflux disease.





#7 Hypertension.





#8 Hyperlipidemia.





#9 Osteoarthritis.





#10 Former smoker.





#11 diastolic congestive heart failure acute on chronic.








Plan





From cardiology's perspective, we'll discontinue the IV Lasix today and start 

the patient on 60 mg of by mouth Lasix twice a day.  She had been on 40 mg by 

mouth twice a day at home prior to coming to the hospital.  Continue the rest 

of her medications.








DNP note has been reviewed, I agree with a documented findings and plan of 

care.  Patient was seen and examined.

## 2019-01-11 VITALS — RESPIRATION RATE: 16 BRPM | TEMPERATURE: 96.1 F | SYSTOLIC BLOOD PRESSURE: 115 MMHG | DIASTOLIC BLOOD PRESSURE: 74 MMHG

## 2019-01-11 VITALS — HEART RATE: 64 BPM

## 2019-01-11 LAB
GLUCOSE BLD-MCNC: 175 MG/DL (ref 75–99)
GLUCOSE BLD-MCNC: 177 MG/DL (ref 75–99)

## 2019-01-11 RX ADMIN — HEPARIN SODIUM SCH UNIT: 5000 INJECTION, SOLUTION INTRAVENOUS; SUBCUTANEOUS at 08:53

## 2019-01-11 RX ADMIN — OXYCODONE HYDROCHLORIDE AND ACETAMINOPHEN SCH MG: 500 TABLET ORAL at 08:48

## 2019-01-11 RX ADMIN — INSULIN DETEMIR SCH UNIT: 100 INJECTION, SOLUTION SUBCUTANEOUS at 10:13

## 2019-01-11 RX ADMIN — AZITHROMYCIN SCH MG: 500 TABLET, FILM COATED ORAL at 08:48

## 2019-01-11 RX ADMIN — ALLOPURINOL SCH MG: 300 TABLET ORAL at 08:47

## 2019-01-11 RX ADMIN — METOPROLOL SUCCINATE SCH MG: 25 TABLET, EXTENDED RELEASE ORAL at 08:53

## 2019-01-11 RX ADMIN — CHOLESTYRAMINE SCH GM: 4 POWDER, FOR SUSPENSION ORAL at 08:47

## 2019-01-11 RX ADMIN — IPRATROPIUM BROMIDE AND ALBUTEROL SULFATE SCH: .5; 3 SOLUTION RESPIRATORY (INHALATION) at 03:35

## 2019-01-11 RX ADMIN — SERTRALINE HYDROCHLORIDE SCH MG: 100 TABLET ORAL at 08:48

## 2019-01-11 RX ADMIN — IPRATROPIUM BROMIDE AND ALBUTEROL SULFATE SCH ML: .5; 3 SOLUTION RESPIRATORY (INHALATION) at 11:36

## 2019-01-11 RX ADMIN — PANTOPRAZOLE SODIUM SCH MG: 40 TABLET, DELAYED RELEASE ORAL at 08:48

## 2019-01-11 RX ADMIN — IPRATROPIUM BROMIDE AND ALBUTEROL SULFATE SCH ML: .5; 3 SOLUTION RESPIRATORY (INHALATION) at 07:55

## 2019-01-11 RX ADMIN — INSULIN ASPART SCH UNIT: 100 INJECTION, SOLUTION INTRAVENOUS; SUBCUTANEOUS at 12:43

## 2019-01-11 RX ADMIN — INSULIN ASPART SCH UNIT: 100 INJECTION, SOLUTION INTRAVENOUS; SUBCUTANEOUS at 08:48

## 2019-01-11 RX ADMIN — FUROSEMIDE SCH MG: 20 TABLET ORAL at 08:53

## 2019-01-11 RX ADMIN — ASPIRIN 81 MG CHEWABLE TABLET SCH MG: 81 TABLET CHEWABLE at 08:48

## 2019-01-11 RX ADMIN — Medication SCH UNIT: at 08:53

## 2019-01-11 RX ADMIN — HYDROXYCHLOROQUINE SULFATE SCH MG: 200 TABLET, FILM COATED ORAL at 08:47

## 2019-01-11 RX ADMIN — Medication SCH MG: at 08:52

## 2019-01-11 NOTE — PN
PROGRESS NOTE



DATE OF SERVICE:

01/11/2019.



REASON FOR FOLLOWUP:

1. Positive urine culture.

2. Diarrhea.



INTERVAL HISTORY:

The patient was seen on rounds this morning.  The patient has been afebrile.  The

patient is feeling better. Her diarrhea has improved. Breathing has improved.  No chest

pain.  Occasional cough.  No abdominal pain.



PHYSICAL EXAMINATION:

Blood pressure 115/74, pulse of 64, temperature 96.1. She is 97% on 3 L nasal cannula.

General description is an elderly female up in the bed in no distress.

RESPIRATORY SYSTEM: Unlabored breathing. Clear to auscultation anteriorly.

HEART: S1, S2.  Regular rate and rhythm.

ABDOMEN: Soft. No tenderness.



LABS:

No new labs have been obtained.  Stool for C difficile was negative.



DIAGNOSTIC IMPRESSION AND PLAN:

1. Patient with a positive urine culture with Enterococcus faecalis, more likely

    contamination, as _____ UA was not positive.  Patient had no symptoms.  No

    antibiotic.

2. Diarrhea.  Stool for Clostridium difficile negative.  She will be treated with

    symptomatic treatment with oral Questran. Advised to increase her yogurt intake.

    Continue supportive care.





MMODL / IJN: [id number] / Job#: [id number]

## 2019-02-15 ENCOUNTER — HOSPITAL ENCOUNTER (INPATIENT)
Dept: HOSPITAL 47 - EC | Age: 79
LOS: 1 days | DRG: 302 | End: 2019-02-16
Attending: HOSPITALIST | Admitting: HOSPITALIST
Payer: MEDICARE

## 2019-02-15 VITALS
RESPIRATION RATE: 28 BRPM | TEMPERATURE: 94.4 F | DIASTOLIC BLOOD PRESSURE: 52 MMHG | HEART RATE: 67 BPM | SYSTOLIC BLOOD PRESSURE: 102 MMHG

## 2019-02-15 VITALS — BODY MASS INDEX: 42 KG/M2

## 2019-02-15 DIAGNOSIS — G47.33: ICD-10-CM

## 2019-02-15 DIAGNOSIS — E78.5: ICD-10-CM

## 2019-02-15 DIAGNOSIS — R57.0: ICD-10-CM

## 2019-02-15 DIAGNOSIS — M19.90: ICD-10-CM

## 2019-02-15 DIAGNOSIS — I50.9: ICD-10-CM

## 2019-02-15 DIAGNOSIS — Z79.4: ICD-10-CM

## 2019-02-15 DIAGNOSIS — J44.9: ICD-10-CM

## 2019-02-15 DIAGNOSIS — F41.9: ICD-10-CM

## 2019-02-15 DIAGNOSIS — F32.9: ICD-10-CM

## 2019-02-15 DIAGNOSIS — I25.10: Primary | ICD-10-CM

## 2019-02-15 DIAGNOSIS — Z66: ICD-10-CM

## 2019-02-15 DIAGNOSIS — N17.9: ICD-10-CM

## 2019-02-15 DIAGNOSIS — Z87.01: ICD-10-CM

## 2019-02-15 DIAGNOSIS — Z81.8: ICD-10-CM

## 2019-02-15 DIAGNOSIS — G93.1: ICD-10-CM

## 2019-02-15 DIAGNOSIS — I49.01: ICD-10-CM

## 2019-02-15 DIAGNOSIS — Z84.2: ICD-10-CM

## 2019-02-15 DIAGNOSIS — Z88.0: ICD-10-CM

## 2019-02-15 DIAGNOSIS — E11.9: ICD-10-CM

## 2019-02-15 DIAGNOSIS — J96.21: ICD-10-CM

## 2019-02-15 DIAGNOSIS — I27.20: ICD-10-CM

## 2019-02-15 DIAGNOSIS — Z51.5: ICD-10-CM

## 2019-02-15 DIAGNOSIS — Z79.899: ICD-10-CM

## 2019-02-15 DIAGNOSIS — I11.0: ICD-10-CM

## 2019-02-15 DIAGNOSIS — I44.7: ICD-10-CM

## 2019-02-15 DIAGNOSIS — Z87.891: ICD-10-CM

## 2019-02-15 DIAGNOSIS — I47.2: ICD-10-CM

## 2019-02-15 DIAGNOSIS — Z99.89: ICD-10-CM

## 2019-02-15 DIAGNOSIS — Z86.79: ICD-10-CM

## 2019-02-15 DIAGNOSIS — Z88.1: ICD-10-CM

## 2019-02-15 DIAGNOSIS — K21.9: ICD-10-CM

## 2019-02-15 DIAGNOSIS — I25.9: ICD-10-CM

## 2019-02-15 DIAGNOSIS — Z98.41: ICD-10-CM

## 2019-02-15 DIAGNOSIS — Z82.49: ICD-10-CM

## 2019-02-15 DIAGNOSIS — I46.2: ICD-10-CM

## 2019-02-15 DIAGNOSIS — Z98.42: ICD-10-CM

## 2019-02-15 DIAGNOSIS — I34.0: ICD-10-CM

## 2019-02-15 DIAGNOSIS — Z91.041: ICD-10-CM

## 2019-02-15 DIAGNOSIS — Z96.1: ICD-10-CM

## 2019-02-15 DIAGNOSIS — Z79.82: ICD-10-CM

## 2019-02-15 LAB
ALBUMIN SERPL-MCNC: 2.7 G/DL (ref 3.5–5)
ALP SERPL-CCNC: 152 U/L (ref 38–126)
ALT SERPL-CCNC: 92 U/L (ref 9–52)
ANION GAP SERPL CALC-SCNC: 10 MMOL/L
APTT BLD: 18.8 SEC (ref 22–30)
AST SERPL-CCNC: 129 U/L (ref 14–36)
BASOPHILS # BLD AUTO: 0.1 K/UL (ref 0–0.2)
BASOPHILS NFR BLD AUTO: 1 %
BUN SERPL-SCNC: 35 MG/DL (ref 7–17)
CALCIUM SPEC-MCNC: 8.1 MG/DL (ref 8.4–10.2)
CHLORIDE SERPL-SCNC: 108 MMOL/L (ref 98–107)
CK SERPL-CCNC: 152 U/L (ref 30–135)
CO2 BLDA-SCNC: 19 MMOL/L (ref 19–24)
CO2 SERPL-SCNC: 21 MMOL/L (ref 22–30)
EOSINOPHIL # BLD AUTO: 0.1 K/UL (ref 0–0.7)
EOSINOPHIL NFR BLD AUTO: 1 %
ERYTHROCYTE [DISTWIDTH] IN BLOOD BY AUTOMATED COUNT: 3.3 M/UL (ref 3.8–5.4)
ERYTHROCYTE [DISTWIDTH] IN BLOOD: 16.1 % (ref 11.5–15.5)
GLUCOSE BLD-MCNC: 226 MG/DL (ref 75–99)
GLUCOSE SERPL-MCNC: 174 MG/DL (ref 74–99)
HCO3 BLDA-SCNC: 17 MMOL/L (ref 21–25)
HCT VFR BLD AUTO: 31.9 % (ref 34–46)
HGB BLD-MCNC: 10.2 GM/DL (ref 11.4–16)
INR PPP: 1.1 (ref ?–1.2)
LYMPHOCYTES # SPEC AUTO: 1.5 K/UL (ref 1–4.8)
LYMPHOCYTES NFR SPEC AUTO: 7 %
MAGNESIUM SPEC-SCNC: 1.8 MG/DL (ref 1.6–2.3)
MCH RBC QN AUTO: 31 PG (ref 25–35)
MCHC RBC AUTO-ENTMCNC: 32.1 G/DL (ref 31–37)
MCV RBC AUTO: 96.6 FL (ref 80–100)
MONOCYTES # BLD AUTO: 0.4 K/UL (ref 0–1)
MONOCYTES NFR BLD AUTO: 2 %
NEUTROPHILS # BLD AUTO: 20.1 K/UL (ref 1.3–7.7)
NEUTROPHILS NFR BLD AUTO: 90 %
PCO2 BLDA: 44 MMHG (ref 35–45)
PH BLDA: 7.2 [PH] (ref 7.35–7.45)
PLATELET # BLD AUTO: 342 K/UL (ref 150–450)
PO2 BLDA: 351 MMHG (ref 83–108)
POTASSIUM SERPL-SCNC: 5.5 MMOL/L (ref 3.5–5.1)
PROT SERPL-MCNC: 5.1 G/DL (ref 6.3–8.2)
PT BLD: 11.3 SEC (ref 9–12)
SODIUM SERPL-SCNC: 139 MMOL/L (ref 137–145)
TROPONIN I SERPL-MCNC: 0.05 NG/ML (ref 0–0.03)
WBC # BLD AUTO: 22.3 K/UL (ref 3.8–10.6)

## 2019-02-15 PROCEDURE — 94002 VENT MGMT INPAT INIT DAY: CPT

## 2019-02-15 PROCEDURE — 82805 BLOOD GASES W/O2 SATURATION: CPT

## 2019-02-15 PROCEDURE — 93005 ELECTROCARDIOGRAM TRACING: CPT

## 2019-02-15 PROCEDURE — 99285 EMERGENCY DEPT VISIT HI MDM: CPT

## 2019-02-15 PROCEDURE — 5A12012 PERFORMANCE OF CARDIAC OUTPUT, SINGLE, MANUAL: ICD-10-PCS

## 2019-02-15 PROCEDURE — 96374 THER/PROPH/DIAG INJ IV PUSH: CPT

## 2019-02-15 PROCEDURE — 80053 COMPREHEN METABOLIC PANEL: CPT

## 2019-02-15 PROCEDURE — 51702 INSERT TEMP BLADDER CATH: CPT

## 2019-02-15 PROCEDURE — 5A1935Z RESPIRATORY VENTILATION, LESS THAN 24 CONSECUTIVE HOURS: ICD-10-PCS

## 2019-02-15 PROCEDURE — 71045 X-RAY EXAM CHEST 1 VIEW: CPT

## 2019-02-15 PROCEDURE — 82550 ASSAY OF CK (CPK): CPT

## 2019-02-15 PROCEDURE — 82553 CREATINE MB FRACTION: CPT

## 2019-02-15 PROCEDURE — 0BH17EZ INSERTION OF ENDOTRACHEAL AIRWAY INTO TRACHEA, VIA NATURAL OR ARTIFICIAL OPENING: ICD-10-PCS

## 2019-02-15 PROCEDURE — 85730 THROMBOPLASTIN TIME PARTIAL: CPT

## 2019-02-15 PROCEDURE — 36415 COLL VENOUS BLD VENIPUNCTURE: CPT

## 2019-02-15 PROCEDURE — 36600 WITHDRAWAL OF ARTERIAL BLOOD: CPT

## 2019-02-15 PROCEDURE — 85025 COMPLETE CBC W/AUTO DIFF WBC: CPT

## 2019-02-15 PROCEDURE — 84484 ASSAY OF TROPONIN QUANT: CPT

## 2019-02-15 PROCEDURE — 96375 TX/PRO/DX INJ NEW DRUG ADDON: CPT

## 2019-02-15 PROCEDURE — 83735 ASSAY OF MAGNESIUM: CPT

## 2019-02-15 PROCEDURE — 92950 HEART/LUNG RESUSCITATION CPR: CPT

## 2019-02-15 PROCEDURE — 85610 PROTHROMBIN TIME: CPT

## 2019-02-15 PROCEDURE — 83880 ASSAY OF NATRIURETIC PEPTIDE: CPT

## 2019-02-15 NOTE — XR
EXAMINATION TYPE: XR chest 1V portable

 

DATE OF EXAM: 2/15/2019

 

COMPARISON: 1/21/2019

 

HISTORY: Cardiac arrest

 

TECHNIQUE: Single frontal view of the chest is obtained.

 

FINDINGS:  Endotracheal tube is 2.5 cm from the laurie. Nasogastric tube appears to have the tip in t
he distal esophagus and is not in the stomach. Heart is enlarged. There is pulmonary vascular congest
ion.

 

IMPRESSION:  There is probably congestive heart failure that is increased compared to last exam. Endo
tracheal tube is in fairly good position. Nasogastric tube in the distal esophagus.

## 2019-02-15 NOTE — HP
HISTORY AND PHYSICAL



DATE OF SERVICE:

02/15/2019



CHIEF COMPLAINT:

Cardiac arrest.



HISTORY OF PRESENT ILLNESS:

This 78-year-old woman with a past medical history of multiple medical problems,

including atrial fibrillation, history of coronary artery disease, history of 
diabetes

mellitus, GERD, hypertension, hyperlipidemia, history of CHF, history of COPD, 
history

of mitral regurgitation, being followed by Dr. Augustin in the office setting, 
apparently

had a syncopal episode and had cardiac arrest.  The patient had a syncopal 
episode in

the lobby of her assisted-living facility.  The patient was out for an unknown 
length

of time and the fire department had a DC shock performed. The patient had 
multiple

arrhythmias, including asystole pulseless electrical activity, ventricular 
fibrillation

and ventricular tachycardia. After several attempts in the ER also, the patient 
came to

normal sinus rhythm. The blood pressure was low, but the family decided not to 
offer

any vasopressors.  The patient was intubated.  The patient is mechanically 
ventilated.

The patient is being closely monitored at this time. Currently the patient is

unresponsive because of mechanical ventilation and other issues. Most of the 
history is

taken from my discussion with the ER physician, review of the chart, discussion 
with

staff as well as discussion with the son at the bedside.  The patient is 
currently NO

CODE and NO RE-INTUBATION. The family is also thinking about hospice/comfort 
measures

at this time.



PAST MEDICAL HISTORY:

1. History of atrial fibrillation.

2. CAD.

3. Diabetes mellitus, type 2.

4. GERD.

5. Hypertension.

6. Hyperlipidemia.

7. History of DJD.



HOME MEDICATIONS:

Include:

1. Senna 2 tablets b.i.d. p.r.n.

2. Albuterol 2 puffs q.4 p.r.n..

3. Biofreeze 1 application daily.

4. Norco 5 mg daily p.r.n.

5. Tums 500 mg daily.

6. Dulcolax 10 mg daily p.r.n.

7. Tylenol 650 q.6 p.r.n.

8. Ocean 2 sprays daily.

9. Zoloft 100 mg daily.

10.Omeprazole 20 mg daily.

11.Vitamin D3 2000 daily.

12.Vitamin C 500 mg p.o. daily.

13.Mycostatin 1 application b.i.d. p.r.n.

14.Toprol-XL 12.5 mg daily.

15.Melatonin 3 mg at bedtime.

16.Magnesium oxide 400 mg daily.

17.DuoNeb q.i.d.

18.Lantus 18 units daily.

19.Humalog 5 units before meals t.i.d.

20.Plaquenil 200 mg p.o. b.i.d.

21.Lasix 20 mg p.o. b.i.d.

22.Ferrous sulfate 320 mg p.o. Monday, Wednesday, Friday.

23.Lipitor 10 mg p.o. daily.

24.Ecotrin 81 mg p.o. daily.

25.Cordarone 200 mg p.o. b.i.d.

26.Zyloprim 300 mg p.o. daily.



ALLERGIES:

1. IODINATED CONTRAST DYES.

2. PENICILLIN.

3. VANCOMYCIN.

Family history, social history, review of systems could not be taken because the

patient is mechanically ventilated and sedated. There is history of coronary 
artery

disease and cardiomegaly in the family.



PHYSICAL EXAMINATION:

Patient is on mechanical ventilation, pulse 62, blood pressure 77/34, 
respiration 20,

temperature normal, pulse ox 100% on mechanical vent. Settings are noted.

HEENT: Conjunctivae normal. Oral mucosa moist.

NECK: No jugular venous distention. No carotid bruit. No lymph node enlargement.

CARDIOVASCULAR SYSTEM: S1, S2 muffled. No S3. No S4.

RESPIRATORY SYSTEM: Breath sounds diminished at the bases.  Bilateral scattered 
rhonchi

and crackles.

ABDOMEN: Soft, non-tender.

LEGS: No edema. No swelling.

NERVOUS SYSTEM: Patient is mechanically ventilated and sedated.

SKIN: No ulcer, rash, bleeding.

JOINTS: No active deforming arthropathy.



LABS:

WBC 22.3, hemoglobin 10.2. ABG shows pH of 7.2, sodium 139, potassium 5.5, 
creatinine

1.40.  Troponin 0.054.  AST, ALT noted.



ASSESSMENT:

1. Acute cardiac arrest, possibly secondary to coronary atherosclerosis and 
myocardial

    ischemia with acute hypoxic respiratory failure, on mechanical ventilation.

2. Severe hypotension with possibly cardiogenic shock.

3. History of atrial fibrillation.

4. Increased AST, ALT, possibly hepatitis.

5. Increased creatinine with acute renal failure.

6. Increased white count, possibly reactive.

7. History of mitral regurgitation.

8. History of coronary artery disease.

9. History of diabetes mellitus, type 2.

10.History of gastroesophageal reflux disease.

11.History of hypertension.

12.Hyperlipidemia.

13.History of degenerative joint disease.

14.History of pneumonia.

15.History of sleep apnea.

16.History of recent pneumonia and COPD, acute exacerbation.

17.Chronic hypoxic respiratory failure.

18.Anxiety, depression.

19.Remote history of nicotine dependence.

20.NO CODE, NO CPR.



RECOMMENDATIONS AND DISCUSSION:

In this 78-year-old woman who presented with multiple medical issues, at this 
time I

recommend to continue the current medication. As mentioned earlier, the 
prognosis is

extremely guarded with possible anoxic brain damage as well because of 
prolonged CPR.

Please refer to the ER notes and EMS notes for further details.  I discussed 
the case

at length with the son and other members of the family at the bedside. At this 
time

as mentioned earlier patient is NO CODE, NO CPR, NO VENT. They are also waiting

for another family member to arrive, and after that they will make a decision 
regarding

comfort measures. In that case, I would recommend extubation and administer 
morphine

either intravenously or as a drip for comfort measures with p.r.n. Ativan and 
continue

to monitor.  The overall prognosis is extremely guarded because of above-
mentioned

multiple medical issues. Further recommendations to follow. A copy of this 
dictation is

being forwarded to Dr. Augustin, who is the primary physician.





AMEE / MADANN: [id number] / Job#: [id number]

JULIET

## 2019-02-15 NOTE — ED
General Adult HPI





- General


Chief complaint: Cardiac Arrest/CPR


Stated complaint: Cardiac arrest


Time Seen by Provider: 02/15/19 17:23


Source: EMS, RN notes reviewed


Mode of arrival: EMS


Limitations: physical limitation





- History of Present Illness


Initial comments: 





This is a 78-year-old female presents emergency Department after having a 

syncopal episode and having a cardiac arrest.  According to EMS the patient had 

syncopal episode in the lobby at her assisted living facility.  When the fire 

department got there there is a ED shocked her once and when they arrived the 

patient had a pulse but soon thereafter lost again and was in asystole in PEA 

they started CPR and gave some epinephrines she has been went back into V. tach 

and V. fib and received another shot.  Patient's pulse came back and then 

shortly thereafter was lost again the found her to be in V. fib and shocked her 

one more time and on arrival they stated they had a pulse.  Shortly thereafter 

we found her not to have a pulse and continued CPR.  No further history is 

available at this time no family member or caregiver is with the patient.





- Related Data


 Home Medications











 Medication  Instructions  Recorded  Confirmed


 


Acetaminophen Tab [Tylenol] 650 mg PO Q6H PRN 07/27/18 02/15/19


 


Albuterol Inhaler [Ventolin Hfa 2 puff INHALATION RT-Q4H PRN 07/27/18 02/15/19





Inhaler]   


 


Ascorbic Acid [Vitamin C] 500 mg PO DAILY 07/27/18 02/15/19


 


Aspirin EC [Ecotrin Low Dose] 81 mg PO DAILY 07/27/18 02/15/19


 


Atorvastatin [Lipitor] 10 mg PO DAILY 07/27/18 02/15/19


 


Cholecalciferol (Vitamin D3) 2,000 unit PO DAILY 07/27/18 02/15/19





[Vitamin D3]   


 


Ferrous Gluconate 324 mg PO MOWEFR 07/27/18 02/15/19


 


Ipratropium-Albuterol Nebulize 3 ml INHALATION RT-QID 07/27/18 02/15/19





[Duoneb 0.5 mg-3 mg/3 ml Soln]   


 


Nystatin 100,000Unit/gm Cream 1 applic TOPICAL BID PRN 07/27/18 02/15/19





[Mycostatin Cream]   


 


Sertraline [Zoloft] 100 mg PO DAILY 07/27/18 02/15/19


 


Allopurinol [Zyloprim] 300 mg PO DAILY 01/04/19 02/15/19


 


Bisacodyl [Dulcolax] 10 mg RECTAL ONCE PRN 01/04/19 02/15/19


 


Calcium Carbonate [Tums] 500 mg PO TID PRN 01/04/19 02/15/19


 


Hydroxychloroquine Sulfate 200 mg PO BID 01/04/19 02/15/19





[Plaquenil]   


 


Magnesium Oxide [Mag-Ox] 400 mg PO DAILY 01/04/19 02/15/19


 


Melatonin 3 mg PO HS 01/04/19 02/15/19


 


Omeprazole 20 mg PO DAILY 01/04/19 02/15/19


 


Sennosides/Docusate Sodium 2 tab PO TUTHSA PRN 01/04/19 02/15/19





[Senna-S Laxative Tablet]   


 


Sodium Chloride [Ocean] 2 spray EA NOSTRIL TID 01/04/19 02/15/19


 


Amiodarone [Cordarone] 200 mg PO BID 02/15/19 02/15/19


 


Furosemide [Lasix] 20 mg PO BID@0900,1700 02/15/19 02/15/19


 


HYDROcodone/APAP 5-325MG [Norco 1 tab PO DAILY PRN 02/15/19 02/15/19





5-325]   


 


INSULIN LISPRO (humaLOG) [humaLOG] 5 unit SQ AC-TID 02/15/19 02/15/19


 


Menthol [Biofreeze] 1 applic TOPICAL TID PRN 02/15/19 02/15/19








 Previous Rx's











 Medication  Instructions  Recorded


 


Insulin Glargine,Hum.rec.anlog 18 unit SQ DAILY #0 01/24/19





[Lantus Solostar]  


 


Metoprolol Succinate (ER) [Toprol 12.5 mg PO DAILY #30 tab.er.24h 01/24/19





XL]  











 Allergies











Allergy/AdvReac Type Severity Reaction Status Date / Time


 


Iodinated Contrast- Oral and Allergy  Unknown Verified 02/15/19 17:34





IV Dye     


 


Penicillins Allergy  Unknown Verified 02/15/19 17:34


 


vancomycin Allergy  Unknown Verified 02/15/19 17:34














Review of Systems


ROS Statement: 


Those systems with pertinent positive or pertinent negative responses have been 

documented in the HPI.





ROS Other: All systems not noted in ROS Statement are negative.





Past Medical History


Past Medical History: Atrial Fibrillation, Coronary Artery Disease (CAD), 

Diabetes Mellitus, GERD/Reflux, Hyperlipidemia, Hypertension, Osteoarthritis (OA

), Pneumonia, Sleep Apnea/CPAP/BIPAP


Additional Past Medical History / Comment(s): Pt recently admitted to [facility] on 1/ 4/19 with sepsis/pne, exacerbation COPD.  Other HX:  Past home O2 at 3L/NC -pt 

states she has weaned herself off oxygen, pulmonary Htn, cardiac valve 

insufficiencies, past L leg weakness, L lower leg/foot is reddened at this time

, IDDM type II, possible MI per EKG in past, ROLANDA but no longer wears Cpap, UTI, 

weakness.


History of Any Multi-Drug Resistant Organisms: None Reported


Past Surgical History: Orthopedic Surgery, Tonsillectomy


Additional Past Surgical History / Comment(s): dontae cataracts removed has lens 

implants, dontae knee replacments


Past Anesthesia/Blood Transfusion Reactions: Motion Sickness


Additional Past Anesthesia/Blood Transfusion Reaction / Comment(s): 

clausterphobia


Past Psychological History: Anxiety, Depression


Smoking Status: Former smoker





- Past Family History


  ** Father


Family Medical History: Coronary Artery Disease (CAD)


Additional Family Medical History / Comment(s): enlarged heart





  ** Mother


Family Medical History: Dementia, Hypertension


Additional Family Medical History / Comment(s): hysterectomy-d/t benign tumor





General Exam





- General Exam Comments


Initial Comments: 





GENERAL:


Patient is well-developed and well-nourished.  She is completely unresponsive





ENT:


No signs of obvious trauma of the head neck





EYES:


The sclera were anicteric and conjunctiva were pink and moist.  Extraocular 

movements were intact and pupils were equal round and reactive to light.  

Eyelids were unremarkable.





PULMONARY:


Patient has been intubated and is being bagged patient has better breath sounds 

on the right than the left





CARDIOVASCULAR:


Patient has no pulses at this time





ABDOMEN:


Soft and nontender with normal bowel sounds.  





SKIN:


Skin is clear with no lesions or rashes and otherwise unremarkable.





NEUROLOGIC:


Patient is not alert or oriented.





MUSCULOSKELETAL:


No obvious signs of trauma





LYMPHATICS:


No significant lymphadenopathy is noted





PSYCHIATRIC:


Unable to assess


Limitations: physical limitation





Course


 Vital Signs











  02/15/19 02/15/19 02/15/19





  17:23 17:26 17:30


 


Pulse Rate  0 L 100


 


Respiratory  0 L 





Rate   


 


Blood Pressure 181/113  87/39


 


O2 Sat by Pulse  0 L 99





Oximetry   














  02/15/19 02/15/19 02/15/19





  17:41 17:45 17:54


 


Pulse Rate  62 59 L


 


Respiratory   





Rate   


 


Blood Pressure 87/39 77/22 160/72


 


O2 Sat by Pulse   





Oximetry   














  02/15/19 02/15/19 02/15/19





  18:00 18:02 18:08


 


Pulse Rate  60 


 


Respiratory   110 H





Rate   


 


Blood Pressure 119/63 97/24 


 


O2 Sat by Pulse   





Oximetry   














  02/15/19 02/15/19 02/15/19





  18:14 18:45 18:50


 


Pulse Rate 56 L 58 L 59 L


 


Respiratory  20 13





Rate   


 


Blood Pressure 99/55 68/24 62/26


 


O2 Sat by Pulse  95 95





Oximetry   














  02/15/19 02/15/19 02/15/19





  18:55 19:00 19:05


 


Pulse Rate 59 L 59 L 60


 


Respiratory 20 17 19





Rate   


 


Blood Pressure 62/52 68/22 70/31


 


O2 Sat by Pulse 95 95 95





Oximetry   














  02/15/19 02/15/19 02/15/19





  19:10 19:15 19:20


 


Pulse Rate 59 L 59 L 59 L


 


Respiratory 17 18 10 L





Rate   


 


Blood Pressure 62/37 62/37 58/44


 


O2 Sat by Pulse 95 96 97





Oximetry   














  02/15/19 02/15/19 02/15/19





  19:25 19:30 19:35


 


Pulse Rate 59 L 59 L 61


 


Respiratory 17 19 22





Rate   


 


Blood Pressure 58/44 58/44 86/28


 


O2 Sat by Pulse 98 98 98





Oximetry   














  02/15/19 02/15/19 02/15/19





  19:40 19:45 19:50


 


Pulse Rate 61 61 62


 


Respiratory 21 18 16





Rate   


 


Blood Pressure 86/28 86/28 77/34


 


O2 Sat by Pulse 98 99 99





Oximetry   














  02/15/19





  19:55


 


Pulse Rate 61


 


Respiratory 20





Rate 


 


Blood Pressure 77/34


 


O2 Sat by Pulse 99





Oximetry 














Medical Decision Making





- Medical Decision Making





When patient arrived she did not have a pulse with began CPR gave a amp of 

epinephrine as well as 1 amp of bicarb.  Patient's pulse returned shortly 

thereafter patient was then given 300 mg bolus of amiodarone.





EKG shows sinus rhythm at 95 bpm [place] interval is 228 QRSs 152 QT interval 390 

QTC is 490.  Patient has a left bundle branch block.





Patient coded another time in the emergency department and no further CPR was 

done because his son was contacted he stated that he did not want any further 

CPR.  Patient's son also refused to have any central lines placed or any 

pressors given.





Chest x-ray shows congestive heart failure.





I spoke with Dr. Justice Rendon agreed to admit the patient admitted patient 

wrote admitting orders.





Sr. the patient arrives to family made the decision to have the patient 

extubated and keep the patient Comfort Care.





- Lab Data


Result diagrams: 


 02/15/19 19:02





 02/15/19 19:02


 Lab Results











  02/15/19 02/15/19 02/15/19 Range/Units





  17:43 18:01 19:02 


 


WBC     (3.8-10.6)  k/uL


 


RBC     (3.80-5.40)  m/uL


 


Hgb     (11.4-16.0)  gm/dL


 


Hct     (34.0-46.0)  %


 


MCV     (80.0-100.0)  fL


 


MCH     (25.0-35.0)  pg


 


MCHC     (31.0-37.0)  g/dL


 


RDW     (11.5-15.5)  %


 


Plt Count     (150-450)  k/uL


 


Neutrophils %     %


 


Lymphocytes %     %


 


Monocytes %     %


 


Eosinophils %     %


 


Basophils %     %


 


Neutrophils #     (1.3-7.7)  k/uL


 


Lymphocytes #     (1.0-4.8)  k/uL


 


Monocytes #     (0-1.0)  k/uL


 


Eosinophils #     (0-0.7)  k/uL


 


Basophils #     (0-0.2)  k/uL


 


Hypochromasia     


 


Anisocytosis     


 


Macrocytosis     


 


PT     (9.0-12.0)  sec


 


INR     (<1.2)  


 


APTT     (22.0-30.0)  sec


 


Sample Site   r rad   


 


ABG pH   7.20 L   (7.35-7.45)  


 


ABG pCO2   44   (35-45)  mmHg


 


ABG pO2   351 H   ()  mmHg


 


ABG HCO3   17 L   (21-25)  mmol/L


 


ABG Total CO2   19   (19-24)  mmol/L


 


ABG O2 Saturation   100.0 H   (94-97)  %


 


ABG Base Excess   -10.5   mmol/L


 


Ivan Test   Yes   


 


FiO2   100   %


 


Sodium     (137-145)  mmol/L


 


Potassium     (3.5-5.1)  mmol/L


 


Chloride     ()  mmol/L


 


Carbon Dioxide     (22-30)  mmol/L


 


Anion Gap     mmol/L


 


BUN     (7-17)  mg/dL


 


Creatinine     (0.52-1.04)  mg/dL


 


Est GFR (CKD-EPI)AfAm     (>60 ml/min/1.73 sqM)  


 


Est GFR (CKD-EPI)NonAf     (>60 ml/min/1.73 sqM)  


 


Glucose     (74-99)  mg/dL


 


POC Glucose (mg/dL)  226 H    (75-99)  mg/dL


 


POC Glu Operater ID  Jeanne Brito    


 


Calcium     (8.4-10.2)  mg/dL


 


Magnesium     (1.6-2.3)  mg/dL


 


Total Bilirubin     (0.2-1.3)  mg/dL


 


AST     (14-36)  U/L


 


ALT     (9-52)  U/L


 


Alkaline Phosphatase     ()  U/L


 


Total Creatine Kinase    152 H  ()  U/L


 


CK-MB (CK-2)    4.6 H  (0.0-2.4)  ng/mL


 


CK-MB (CK-2) Rel Index    3.0  


 


Troponin I    0.054 H*  (0.000-0.034)  ng/mL


 


NT-Pro-B Natriuret Pep     pg/mL


 


Total Protein     (6.3-8.2)  g/dL


 


Albumin     (3.5-5.0)  g/dL














  02/15/19 02/15/19 02/15/19 Range/Units





  19:02 19:02 19:02 


 


WBC  22.3 H    (3.8-10.6)  k/uL


 


RBC  3.30 L    (3.80-5.40)  m/uL


 


Hgb  10.2 L    (11.4-16.0)  gm/dL


 


Hct  31.9 L    (34.0-46.0)  %


 


MCV  96.6  D    (80.0-100.0)  fL


 


MCH  31.0    (25.0-35.0)  pg


 


MCHC  32.1    (31.0-37.0)  g/dL


 


RDW  16.1 H    (11.5-15.5)  %


 


Plt Count  342    (150-450)  k/uL


 


Neutrophils %  90    %


 


Lymphocytes %  7    %


 


Monocytes %  2    %


 


Eosinophils %  1    %


 


Basophils %  1    %


 


Neutrophils #  20.1 H    (1.3-7.7)  k/uL


 


Lymphocytes #  1.5    (1.0-4.8)  k/uL


 


Monocytes #  0.4    (0-1.0)  k/uL


 


Eosinophils #  0.1    (0-0.7)  k/uL


 


Basophils #  0.1    (0-0.2)  k/uL


 


Hypochromasia  Marked    


 


Anisocytosis  Slight    


 


Macrocytosis  Slight    


 


PT     (9.0-12.0)  sec


 


INR     (<1.2)  


 


APTT     (22.0-30.0)  sec


 


Sample Site     


 


ABG pH     (7.35-7.45)  


 


ABG pCO2     (35-45)  mmHg


 


ABG pO2     ()  mmHg


 


ABG HCO3     (21-25)  mmol/L


 


ABG Total CO2     (19-24)  mmol/L


 


ABG O2 Saturation     (94-97)  %


 


ABG Base Excess     mmol/L


 


Ivan Test     


 


FiO2     %


 


Sodium   139   (137-145)  mmol/L


 


Potassium   5.5 H   (3.5-5.1)  mmol/L


 


Chloride   108 H   ()  mmol/L


 


Carbon Dioxide   21 L   (22-30)  mmol/L


 


Anion Gap   10   mmol/L


 


BUN   35 H   (7-17)  mg/dL


 


Creatinine   1.40 H   (0.52-1.04)  mg/dL


 


Est GFR (CKD-EPI)AfAm   42   (>60 ml/min/1.73 sqM)  


 


Est GFR (CKD-EPI)NonAf   36   (>60 ml/min/1.73 sqM)  


 


Glucose   174 H   (74-99)  mg/dL


 


POC Glucose (mg/dL)     (75-99)  mg/dL


 


POC Glu Operater ID     


 


Calcium   8.1 L   (8.4-10.2)  mg/dL


 


Magnesium   1.8   (1.6-2.3)  mg/dL


 


Total Bilirubin   0.8   (0.2-1.3)  mg/dL


 


AST   129 H   (14-36)  U/L


 


ALT   92 H   (9-52)  U/L


 


Alkaline Phosphatase   152 H   ()  U/L


 


Total Creatine Kinase     ()  U/L


 


CK-MB (CK-2)     (0.0-2.4)  ng/mL


 


CK-MB (CK-2) Rel Index     


 


Troponin I     (0.000-0.034)  ng/mL


 


NT-Pro-B Natriuret Pep    61979  pg/mL


 


Total Protein   5.1 L   (6.3-8.2)  g/dL


 


Albumin   2.7 L   (3.5-5.0)  g/dL














  02/15/19 Range/Units





  19:02 


 


WBC   (3.8-10.6)  k/uL


 


RBC   (3.80-5.40)  m/uL


 


Hgb   (11.4-16.0)  gm/dL


 


Hct   (34.0-46.0)  %


 


MCV   (80.0-100.0)  fL


 


MCH   (25.0-35.0)  pg


 


MCHC   (31.0-37.0)  g/dL


 


RDW   (11.5-15.5)  %


 


Plt Count   (150-450)  k/uL


 


Neutrophils %   %


 


Lymphocytes %   %


 


Monocytes %   %


 


Eosinophils %   %


 


Basophils %   %


 


Neutrophils #   (1.3-7.7)  k/uL


 


Lymphocytes #   (1.0-4.8)  k/uL


 


Monocytes #   (0-1.0)  k/uL


 


Eosinophils #   (0-0.7)  k/uL


 


Basophils #   (0-0.2)  k/uL


 


Hypochromasia   


 


Anisocytosis   


 


Macrocytosis   


 


PT  11.3  (9.0-12.0)  sec


 


INR  1.1  (<1.2)  


 


APTT  18.8 L  (22.0-30.0)  sec


 


Sample Site   


 


ABG pH   (7.35-7.45)  


 


ABG pCO2   (35-45)  mmHg


 


ABG pO2   ()  mmHg


 


ABG HCO3   (21-25)  mmol/L


 


ABG Total CO2   (19-24)  mmol/L


 


ABG O2 Saturation   (94-97)  %


 


ABG Base Excess   mmol/L


 


Ivan Test   


 


FiO2   %


 


Sodium   (137-145)  mmol/L


 


Potassium   (3.5-5.1)  mmol/L


 


Chloride   ()  mmol/L


 


Carbon Dioxide   (22-30)  mmol/L


 


Anion Gap   mmol/L


 


BUN   (7-17)  mg/dL


 


Creatinine   (0.52-1.04)  mg/dL


 


Est GFR (CKD-EPI)AfAm   (>60 ml/min/1.73 sqM)  


 


Est GFR (CKD-EPI)NonAf   (>60 ml/min/1.73 sqM)  


 


Glucose   (74-99)  mg/dL


 


POC Glucose (mg/dL)   (75-99)  mg/dL


 


POC Glu Operater ID   


 


Calcium   (8.4-10.2)  mg/dL


 


Magnesium   (1.6-2.3)  mg/dL


 


Total Bilirubin   (0.2-1.3)  mg/dL


 


AST   (14-36)  U/L


 


ALT   (9-52)  U/L


 


Alkaline Phosphatase   ()  U/L


 


Total Creatine Kinase   ()  U/L


 


CK-MB (CK-2)   (0.0-2.4)  ng/mL


 


CK-MB (CK-2) Rel Index   


 


Troponin I   (0.000-0.034)  ng/mL


 


NT-Pro-B Natriuret Pep   pg/mL


 


Total Protein   (6.3-8.2)  g/dL


 


Albumin   (3.5-5.0)  g/dL














Disposition


Clinical Impression: 


 Cardiac arrest, Pulmonary edema, Hypotension, Ventricular tachycardia





Disposition: ADMITTED AS IP TO THIS HOSP


Referrals: 


Amado Augustin MD [Primary Care Provider] - 1-2 days


Time of Disposition: 20:57

## 2019-02-16 NOTE — DS
DISCHARGE SUMMARY



PRIMARY CAUSE OF DEATH:

Cardiac arrest secondary to coronary atherosclerosis and possible myocardial 
ischemia.



OTHER DIAGNOSES:

1. Acute hypoxic respiratory failure on mechanical ventilation.

2. Prolonged CPR.

3. Severe hypotension with possible cardiogenic shock.

4. History of atrial fibrillation.

5. Increased AST/ALT possibly hepatitis.

6. Increased creatinine with acute renal failure.

7. Increased WBC possibly reactive.

8. History of mitral regurgitation.

9. History of coronary artery disease.

10.History of diabetes type 2.

11.History gastroesophageal reflux disease.

12.Hypertension.

13.Hyperlipidemia.

14.History of degenerative joint disease.

15.History of pneumonia.

16.History of obstructive sleep apnea.

17.History of recent pneumonia.

18.Chronic obstructive pulmonary disease.

19.Chronic hypoxic respiratory failure.

20.Anxiety/Depression.

21.Remote history of nicotine dependence.

22.NO CODE, NO CPR, NO VENT.



HISTORY OF PRESENT ILLNESS:

This 78-year-old woman with a past medical history of multiple medical problems 
as

mentioned earlier, being followed by Dr. Augustin in the outpatient setting was 
admitted

with history of cardiac arrest.  The patient had on field resuscitation by the 
EMS

which was prolonged.  The patient had multiple rhythms.  Please refer to the ER 
notes

and EMS notes for further details.  The patient finally regained rhythm and was

mechanically ventilated for a brief period of time, but however the case was 
discussed

with family at length and the family opted for hospice care because of the 
extremely

grave prognosis and multiple other complex medical issues.



Subsequently patient  because of the above mentioned multiple complex 
medical

issues and throughout the hospital stay.  Please refer to multiple progress 
notes

and staff notes for further details.





MMODL / IJN: [id number] / Job#: [id number]

JULIET

## 2019-02-18 NOTE — CDI
Documentation Clarification Form



Date: 2/18/19

From: Maritza Paco

Phone: [phone] [place],  Hours-8:30 am & 5 pm MLANA

MRN: [id number]

Admit Date: 2/15/2019 8:57:00 PM

Patient Name: Kayce Virgen

Visit Number: [id number]

Discharge Date:  2/16/2019 1:45:00 PM





ATTENTION: The Clinical Documentation Specialists (CDI) and [facility] Coding Staff 
appreciate your assistance in clarifying documentation. Please respond to the 
clarification below the line at the bottom and electronically sign. The CDI & 
[facility] Coding staff will review the response and follow-up if needed. Please note: 
Queries are made part of the Legal Health Record. If you have any questions, 
please contact the author of this message via ITS.



Dr. Joe Rendon



The patient presented with the following cardiac arrest secondary to coronary 
atherosclerosis and possible myocardial ischemia.



History/Risk Factors: asytole,PEA, vent fib/tachycardia, CPR, ac on chr hypoxic 
respiratory failure

Lab findings: Troponin I-0.054; CK-MB CK2 - 4.6

Radiology findings: CHF

Treatment:



In your professional opinion, can you please clarify myocardial ischemia?

            Acute, without myocardial infarction

            Silent

            Other, please specify ________

   Unable to determine

___________________________________________________________________________



Unable to determine
MTDD